# Patient Record
Sex: FEMALE | Race: ASIAN | NOT HISPANIC OR LATINO | Employment: UNEMPLOYED | URBAN - METROPOLITAN AREA
[De-identification: names, ages, dates, MRNs, and addresses within clinical notes are randomized per-mention and may not be internally consistent; named-entity substitution may affect disease eponyms.]

---

## 2019-05-10 PROBLEM — K52.9 ACUTE GASTROENTERITIS: Status: ACTIVE | Noted: 2019-05-10

## 2019-05-11 PROBLEM — E01.0 THYROMEGALY: Status: ACTIVE | Noted: 2019-05-11

## 2019-05-11 PROBLEM — D75.1 POLYCYTHEMIA: Status: ACTIVE | Noted: 2019-05-11

## 2019-05-11 PROBLEM — R80.9 PROTEINURIA: Status: ACTIVE | Noted: 2019-05-11

## 2019-05-11 PROBLEM — R11.10 RETCHING: Status: ACTIVE | Noted: 2019-05-11

## 2019-05-11 PROBLEM — R94.6 ABNORMAL THYROID FUNCTION TEST: Status: ACTIVE | Noted: 2019-05-11

## 2019-05-11 PROBLEM — E87.1 HYPONATREMIA: Status: ACTIVE | Noted: 2019-05-11

## 2019-05-11 PROBLEM — E43 SEVERE MALNUTRITION: Status: ACTIVE | Noted: 2019-05-11

## 2019-05-11 PROBLEM — K21.9 GASTROESOPHAGEAL REFLUX: Status: ACTIVE | Noted: 2019-05-11

## 2019-05-11 PROBLEM — Z75.8 DISCHARGE PLANNING ISSUES: Status: ACTIVE | Noted: 2019-05-11

## 2019-05-12 PROBLEM — E03.9 HYPOTHYROIDISM: Status: ACTIVE | Noted: 2019-05-12

## 2019-05-12 PROBLEM — R11.10 RETCHING: Status: RESOLVED | Noted: 2019-05-11 | Resolved: 2019-05-12

## 2019-05-14 PROBLEM — R33.9 URINARY RETENTION: Status: ACTIVE | Noted: 2019-05-14

## 2019-05-15 PROBLEM — E87.1 HYPONATREMIA: Status: RESOLVED | Noted: 2019-05-11 | Resolved: 2019-05-15

## 2019-05-16 PROBLEM — K21.9 GASTROESOPHAGEAL REFLUX: Status: RESOLVED | Noted: 2019-05-11 | Resolved: 2019-05-16

## 2019-05-16 PROBLEM — E03.9 HYPOTHYROIDISM: Status: RESOLVED | Noted: 2019-05-12 | Resolved: 2019-05-16

## 2019-05-17 PROBLEM — A04.8 HELICOBACTER PYLORI (H. PYLORI) INFECTION: Status: ACTIVE | Noted: 2019-05-17

## 2019-05-18 ENCOUNTER — TELEPHONE (OUTPATIENT)
Dept: NEPHROLOGY | Facility: CLINIC | Age: 44
End: 2019-05-18

## 2019-05-18 NOTE — TELEPHONE ENCOUNTER
Hospital pt discharged over the weekend. Please schedule follow up this Friday in [place]. No prior labs needed. Thank you

## 2019-05-22 ENCOUNTER — TELEPHONE (OUTPATIENT)
Dept: NEPHROLOGY | Facility: CLINIC | Age: 44
End: 2019-05-22

## 2019-05-22 NOTE — TELEPHONE ENCOUNTER
Pt is a hospital follow up and is rescheduled from [place] 5/24 to 6/7 in [place]. She lives in [place]. Please see if she can be seen Monday in [place] 6/3 as I would liek to go over the kidney biopsy results and treatment plan with pt and her . Thank you

## 2019-05-22 NOTE — TELEPHONE ENCOUNTER
called no answer - left message for pt. to call - i will change appt in system  And try and reach pt.

## 2019-06-03 ENCOUNTER — TELEPHONE (OUTPATIENT)
Dept: NEPHROLOGY | Facility: CLINIC | Age: 44
End: 2019-06-03

## 2019-06-03 ENCOUNTER — LAB VISIT (OUTPATIENT)
Dept: LAB | Facility: HOSPITAL | Age: 44
End: 2019-06-03
Attending: INTERNAL MEDICINE
Payer: OTHER GOVERNMENT

## 2019-06-03 ENCOUNTER — OFFICE VISIT (OUTPATIENT)
Dept: NEPHROLOGY | Facility: CLINIC | Age: 44
End: 2019-06-03
Payer: OTHER GOVERNMENT

## 2019-06-03 VITALS
HEART RATE: 83 BPM | BODY MASS INDEX: 21.43 KG/M2 | HEIGHT: 64 IN | WEIGHT: 125.5 LBS | SYSTOLIC BLOOD PRESSURE: 109 MMHG | DIASTOLIC BLOOD PRESSURE: 70 MMHG

## 2019-06-03 DIAGNOSIS — R80.9 NEPHROTIC RANGE PROTEINURIA: Primary | ICD-10-CM

## 2019-06-03 DIAGNOSIS — A04.8 HELICOBACTER PYLORI (H. PYLORI) INFECTION: ICD-10-CM

## 2019-06-03 DIAGNOSIS — R80.9 NEPHROTIC RANGE PROTEINURIA: ICD-10-CM

## 2019-06-03 DIAGNOSIS — R60.0 LOCALIZED EDEMA: ICD-10-CM

## 2019-06-03 DIAGNOSIS — N05.0 MINIMAL CHANGE DISEASE: ICD-10-CM

## 2019-06-03 LAB
ANION GAP SERPL CALC-SCNC: 9 MMOL/L (ref 8–16)
BUN SERPL-MCNC: 24 MG/DL (ref 6–20)
CALCIUM SERPL-MCNC: 8.5 MG/DL (ref 8.7–10.5)
CHLORIDE SERPL-SCNC: 101 MMOL/L (ref 95–110)
CO2 SERPL-SCNC: 30 MMOL/L (ref 23–29)
CREAT SERPL-MCNC: 0.8 MG/DL (ref 0.5–1.4)
EST. GFR  (AFRICAN AMERICAN): >60 ML/MIN/1.73 M^2
EST. GFR  (NON AFRICAN AMERICAN): >60 ML/MIN/1.73 M^2
GLUCOSE SERPL-MCNC: 174 MG/DL (ref 70–110)
POTASSIUM SERPL-SCNC: 3.5 MMOL/L (ref 3.5–5.1)
SODIUM SERPL-SCNC: 140 MMOL/L (ref 136–145)

## 2019-06-03 PROCEDURE — 99999 PR PBB SHADOW E&M-EST. PATIENT-LVL III: CPT | Mod: PBBFAC,,, | Performed by: INTERNAL MEDICINE

## 2019-06-03 PROCEDURE — 99214 OFFICE O/P EST MOD 30 MIN: CPT | Mod: S$PBB,,, | Performed by: INTERNAL MEDICINE

## 2019-06-03 PROCEDURE — 99999 PR PBB SHADOW E&M-EST. PATIENT-LVL III: ICD-10-PCS | Mod: PBBFAC,,, | Performed by: INTERNAL MEDICINE

## 2019-06-03 PROCEDURE — 80048 BASIC METABOLIC PNL TOTAL CA: CPT

## 2019-06-03 PROCEDURE — 99214 PR OFFICE/OUTPT VISIT, EST, LEVL IV, 30-39 MIN: ICD-10-PCS | Mod: S$PBB,,, | Performed by: INTERNAL MEDICINE

## 2019-06-03 PROCEDURE — 36415 COLL VENOUS BLD VENIPUNCTURE: CPT | Mod: PO

## 2019-06-03 PROCEDURE — 99213 OFFICE O/P EST LOW 20 MIN: CPT | Mod: PBBFAC,PO | Performed by: INTERNAL MEDICINE

## 2019-06-04 RX ORDER — FUROSEMIDE 20 MG/1
20 TABLET ORAL 2 TIMES DAILY
Qty: 60 TABLET | Refills: 11
Start: 2019-06-04 | End: 2019-07-01

## 2019-06-04 NOTE — TELEPHONE ENCOUNTER
Mailed to patient:    Please continue the prednisone  60mg until June 15th.      June 15th she should decrease the prenidsone to 40mg (two 20mg tablets)     You will see her on July 1 with labs prior. You will find the appointment slips enclosed.      Let me know how I can help before then.     Eugenia Marley LPN  Nephrology  PH   [phone]  PH   [phone]  [phone]

## 2019-06-04 NOTE — TELEPHONE ENCOUNTER
Please inform pt/:    Good news! The protein is down to 0.28 grams (was 21 grams) We continue Prednisone at 60mg for 4 weeks total then start to wean the dose. I had my dates a little wrong in clinic today and thought she started Prednisone May 8th but it was closer to May 15th so on June 15th she should decrease to 40mg (two 20mg tablets) and see me July 1st in [place] (40min EP please) Labs prior, orders in. Thank you

## 2019-06-04 NOTE — TELEPHONE ENCOUNTER
I spoke with patient's .  He seemed to understand, but I will print the instructions to mail to him re: prednisone.

## 2019-06-16 PROBLEM — R60.9 EDEMA: Status: ACTIVE | Noted: 2019-06-16

## 2019-06-16 PROBLEM — N05.0 MINIMAL CHANGE DISEASE: Status: ACTIVE | Noted: 2019-06-16

## 2019-06-16 NOTE — PROGRESS NOTES
Subjective:       Patient ID: Giles Bravo is a 44 y.o.  female who presents for follow-up evaluation of Minimal Change Disease    HPI     She was hospitalized for two main complaints: 1) GI which turned out to be H. Pylori and has resolved with treatment and 2) swelling all over. She was diagnosed with nephrotic syndrome, underwent kidney biopsy which returned as Minimal Change Disease and ATN. She was started on Prednisone 60mg QAM as well as torsemide and was discharged. She presents today for hospital follow up. She feels better, foamy urine has improved. She notes cramps in left leg. She was unable to get torsemide filled, went to ER in [place] and was given Lasix with KCl.     Review of Systems   Constitutional: Negative for fatigue and fever.   HENT: Negative for facial swelling.    Eyes: Negative for visual disturbance.   Respiratory: Negative for shortness of breath.    Cardiovascular: Positive for leg swelling (much improved, only in feet).   Gastrointestinal: Negative for abdominal pain, nausea and vomiting.   Genitourinary: Positive for frequency. Negative for difficulty urinating and hematuria.   Musculoskeletal: Negative for arthralgias.   Skin: Negative for rash.   Neurological: Negative for tremors, weakness and headaches.   Psychiatric/Behavioral: Negative for confusion.       Objective:      Physical Exam   Constitutional: She is oriented to person, place, and time. No distress.   HENT:   Mouth/Throat: Oropharynx is clear and moist.   Eyes: No scleral icterus.   Neck: No JVD present.   Cardiovascular: Exam reveals no friction rub.   Pulmonary/Chest: She has no wheezes. She has no rales.   Abdominal: Soft.   Musculoskeletal: She exhibits edema.   Neurological: She is alert and oriented to person, place, and time.   Psychiatric: She has a normal mood and affect.   Nursing note and vitals reviewed.      Assessment:       1. Nephrotic range proteinuria    2. Minimal change disease    3.  Localized edema    4. Helicobacter pylori (H. pylori) infection        Plan:           Minimal Change disease  - by symptoms she is responding well to steroids  - will check upc today    CARMEN  - RAAS blocker was not started during hospital stay due to increased Cr  - check chemistry today    Muscle cramps  - likely from sodium shifts from loop diuretic  - check lytes today      Labs today  RTC pending results

## 2019-06-26 ENCOUNTER — LAB VISIT (OUTPATIENT)
Dept: LAB | Facility: HOSPITAL | Age: 44
End: 2019-06-26
Attending: INTERNAL MEDICINE
Payer: OTHER GOVERNMENT

## 2019-06-26 DIAGNOSIS — R80.9 NEPHROTIC RANGE PROTEINURIA: ICD-10-CM

## 2019-06-26 LAB
BACTERIA #/AREA URNS HPF: ABNORMAL /HPF
BILIRUB UR QL STRIP: NEGATIVE
CLARITY UR: CLEAR
COLOR UR: YELLOW
CREAT UR-MCNC: 197 MG/DL (ref 15–325)
GLUCOSE UR QL STRIP: NEGATIVE
HGB UR QL STRIP: NEGATIVE
HYALINE CASTS #/AREA URNS LPF: 3 /LPF
KETONES UR QL STRIP: NEGATIVE
LEUKOCYTE ESTERASE UR QL STRIP: NEGATIVE
MICROSCOPIC COMMENT: ABNORMAL
NITRITE UR QL STRIP: NEGATIVE
PH UR STRIP: 6 [PH] (ref 5–8)
PROT UR QL STRIP: ABNORMAL
PROT UR-MCNC: 22 MG/DL (ref 0–15)
PROT/CREAT UR: 0.11 MG/G{CREAT} (ref 0–0.2)
RBC #/AREA URNS HPF: 1 /HPF (ref 0–4)
SP GR UR STRIP: 1.02 (ref 1–1.03)
SQUAMOUS #/AREA URNS HPF: 7 /HPF
URN SPEC COLLECT METH UR: ABNORMAL
WBC #/AREA URNS HPF: 3 /HPF (ref 0–5)

## 2019-06-26 PROCEDURE — 82570 ASSAY OF URINE CREATININE: CPT

## 2019-06-26 PROCEDURE — 81000 URINALYSIS NONAUTO W/SCOPE: CPT | Mod: PO

## 2019-07-01 ENCOUNTER — OFFICE VISIT (OUTPATIENT)
Dept: NEPHROLOGY | Facility: CLINIC | Age: 44
End: 2019-07-01
Payer: OTHER GOVERNMENT

## 2019-07-01 VITALS — BODY MASS INDEX: 22.36 KG/M2 | HEIGHT: 64 IN | WEIGHT: 131 LBS

## 2019-07-01 DIAGNOSIS — R80.9 NEPHROTIC RANGE PROTEINURIA: ICD-10-CM

## 2019-07-01 DIAGNOSIS — N05.0 MINIMAL CHANGE DISEASE: Primary | ICD-10-CM

## 2019-07-01 DIAGNOSIS — A04.8 HELICOBACTER PYLORI (H. PYLORI) INFECTION: ICD-10-CM

## 2019-07-01 PROCEDURE — 99214 PR OFFICE/OUTPT VISIT, EST, LEVL IV, 30-39 MIN: ICD-10-PCS | Mod: S$PBB,,, | Performed by: INTERNAL MEDICINE

## 2019-07-01 PROCEDURE — 99999 PR PBB SHADOW E&M-EST. PATIENT-LVL III: CPT | Mod: PBBFAC,,, | Performed by: INTERNAL MEDICINE

## 2019-07-01 PROCEDURE — 99213 OFFICE O/P EST LOW 20 MIN: CPT | Mod: PBBFAC,PO | Performed by: INTERNAL MEDICINE

## 2019-07-01 PROCEDURE — 99999 PR PBB SHADOW E&M-EST. PATIENT-LVL III: ICD-10-PCS | Mod: PBBFAC,,, | Performed by: INTERNAL MEDICINE

## 2019-07-01 PROCEDURE — 99214 OFFICE O/P EST MOD 30 MIN: CPT | Mod: S$PBB,,, | Performed by: INTERNAL MEDICINE

## 2019-07-01 RX ORDER — AZELASTINE 1 MG/ML
1 SPRAY, METERED NASAL
COMMUNITY
Start: 2019-02-11 | End: 2019-07-01 | Stop reason: ALTCHOICE

## 2019-07-01 RX ORDER — PREDNISONE 10 MG/1
TABLET ORAL
Qty: 50 TABLET | Refills: 0 | Status: SHIPPED | OUTPATIENT
Start: 2019-07-01 | End: 2019-09-05

## 2019-07-01 RX ORDER — POTASSIUM CHLORIDE 20 MEQ/1
20 TABLET, EXTENDED RELEASE ORAL
COMMUNITY
Start: 2019-05-21 | End: 2019-07-01 | Stop reason: ALTCHOICE

## 2019-07-01 RX ORDER — ESOMEPRAZOLE MAGNESIUM 40 MG/1
40 CAPSULE, DELAYED RELEASE ORAL
COMMUNITY
Start: 2019-02-13 | End: 2019-08-12

## 2019-07-01 RX ORDER — FLUTICASONE PROPIONATE 50 MCG
2 SPRAY, SUSPENSION (ML) NASAL
COMMUNITY
Start: 2019-02-11 | End: 2019-07-01 | Stop reason: ALTCHOICE

## 2019-07-08 NOTE — PROGRESS NOTES
Subjective:       Patient ID: Giles Bravo is a 44 y.o.  female who presents for follow-up evaluation of Follow-up and Nephrotic Syndrome    HPI       She was hospitalized for two main complaints: 1) GI which turned out to be H. Pylori and has resolved with treatment and 2) swelling all over. She was diagnosed with nephrotic syndrome, underwent kidney biopsy which returned as Minimal Change Disease and ATN. She was started on Prednisone 60mg QAM as well as torsemide and was discharged. She presents today for hospital follow up. She feels better, foamy urine has improved. She notes cramps in left leg. She was unable to get torsemide filled, went to ER in [place] and was given Lasix with KCl.     Interval history June 2019: no foamy urine. No edema and no abdominal fullness. She is feeling well overall. Saw Dr. Nolan for follow up of thyroid disease    Review of Systems   Constitutional: Negative for fatigue and fever.   HENT: Negative for facial swelling.    Eyes: Negative for visual disturbance.   Respiratory: Negative for shortness of breath.    Cardiovascular: Negative for leg swelling (much improved, only in feet).   Gastrointestinal: Negative for abdominal distention, nausea and vomiting.   Genitourinary: Negative for difficulty urinating, frequency and hematuria.   Musculoskeletal: Negative for arthralgias.   Skin: Negative for rash.   Neurological: Negative for tremors, weakness and headaches.   Psychiatric/Behavioral: Negative for confusion.       Objective:      Physical Exam   Constitutional: She is oriented to person, place, and time. No distress.   HENT:   Mouth/Throat: Oropharynx is clear and moist.   Eyes: No scleral icterus.   Neck: No JVD present.   Cardiovascular: Exam reveals no friction rub.   Pulmonary/Chest: She has no wheezes. She has no rales.   Abdominal: Soft.   Musculoskeletal: She exhibits no edema.   Neurological: She is alert and oriented to person, place, and time.    Psychiatric: She has a normal mood and affect.   Nursing note and vitals reviewed.      Assessment:       1. Minimal change disease    2. Nephrotic range proteinuria    3. Helicobacter pylori (H. pylori) infection        Plan:           Minimal Change Disease  - very good response to steroids alone, upc is normal, serum albumin is 3.4, and ATN resolved by serum creatinine of 0.8mg/dL  - taper Prednisone per the following:10mg X 4 weeks then 5mg X 4 weeks then will re-evaluate     Volume status is euvolemic   - stop Lasix      RTC 6 weeks      - RAAS blocker was not started during hospital stay due to increased Cr  - check chemistry today    Muscle cramps  - likely from sodium shifts from loop diuretic  - check lytes today      Labs today  RTC pending results

## 2019-07-18 ENCOUNTER — OFFICE VISIT (OUTPATIENT)
Dept: NEPHROLOGY | Facility: CLINIC | Age: 44
End: 2019-07-18
Payer: OTHER GOVERNMENT

## 2019-07-18 ENCOUNTER — TELEPHONE (OUTPATIENT)
Dept: NEPHROLOGY | Facility: CLINIC | Age: 44
End: 2019-07-18

## 2019-07-18 VITALS
HEIGHT: 64 IN | BODY MASS INDEX: 22.85 KG/M2 | SYSTOLIC BLOOD PRESSURE: 126 MMHG | WEIGHT: 133.81 LBS | HEART RATE: 78 BPM | DIASTOLIC BLOOD PRESSURE: 72 MMHG

## 2019-07-18 DIAGNOSIS — N39.0 UTI (URINARY TRACT INFECTION), UNCOMPLICATED: ICD-10-CM

## 2019-07-18 DIAGNOSIS — A04.8 HELICOBACTER PYLORI (H. PYLORI) INFECTION: ICD-10-CM

## 2019-07-18 DIAGNOSIS — E03.9 HYPOTHYROIDISM, UNSPECIFIED TYPE: ICD-10-CM

## 2019-07-18 DIAGNOSIS — N05.0 MINIMAL CHANGE DISEASE: Primary | ICD-10-CM

## 2019-07-18 PROCEDURE — 99999 PR PBB SHADOW E&M-EST. PATIENT-LVL III: CPT | Mod: PBBFAC,,, | Performed by: INTERNAL MEDICINE

## 2019-07-18 PROCEDURE — 99999 PR PBB SHADOW E&M-EST. PATIENT-LVL III: ICD-10-PCS | Mod: PBBFAC,,, | Performed by: INTERNAL MEDICINE

## 2019-07-18 PROCEDURE — 99214 PR OFFICE/OUTPT VISIT, EST, LEVL IV, 30-39 MIN: ICD-10-PCS | Mod: S$PBB,,, | Performed by: INTERNAL MEDICINE

## 2019-07-18 PROCEDURE — 99213 OFFICE O/P EST LOW 20 MIN: CPT | Mod: PBBFAC,PO | Performed by: INTERNAL MEDICINE

## 2019-07-18 PROCEDURE — 99214 OFFICE O/P EST MOD 30 MIN: CPT | Mod: S$PBB,,, | Performed by: INTERNAL MEDICINE

## 2019-07-18 NOTE — PATIENT INSTRUCTIONS
1. Minimal Change Disease may have returned due to fast taper of prednisone that you mistakenly did June 24th. If the disease returned--this will be confirmed by urine tests today.   2. If the disease has returned we will increase prednisone back to 40mg for at least 4 weeks.

## 2019-07-18 NOTE — TELEPHONE ENCOUNTER
Unable to reach patient x 3 attempts this morning.  I cannot figure out who her PCP might be.      I am not able to obtain results from [facility].

## 2019-07-18 NOTE — TELEPHONE ENCOUNTER
"Spoke with  who states they saw Dr Villanueva, as patient had headache and "kidney pain."  He recommended for her to follow up for proteinuria.      Will acquire results from his office.   "

## 2019-07-18 NOTE — TELEPHONE ENCOUNTER
Pt scheduled today to see me earlier than scheduled--PCP noted 3+ protein on u/a. Can we obtain those results prior to her appt? Thank you

## 2019-07-19 ENCOUNTER — LAB VISIT (OUTPATIENT)
Dept: LAB | Facility: HOSPITAL | Age: 44
End: 2019-07-19
Attending: INTERNAL MEDICINE
Payer: OTHER GOVERNMENT

## 2019-07-19 ENCOUNTER — TELEPHONE (OUTPATIENT)
Dept: UROLOGY | Facility: CLINIC | Age: 44
End: 2019-07-19

## 2019-07-19 ENCOUNTER — TELEPHONE (OUTPATIENT)
Dept: NEPHROLOGY | Facility: CLINIC | Age: 44
End: 2019-07-19

## 2019-07-19 DIAGNOSIS — N39.0 UTI (URINARY TRACT INFECTION), UNCOMPLICATED: ICD-10-CM

## 2019-07-19 DIAGNOSIS — N05.0 MINIMAL CHANGE DISEASE: ICD-10-CM

## 2019-07-19 LAB
BACTERIA #/AREA URNS HPF: ABNORMAL /HPF
BILIRUB UR QL STRIP: NEGATIVE
CLARITY UR: CLEAR
COLOR UR: YELLOW
CREAT UR-MCNC: 174 MG/DL (ref 15–325)
GLUCOSE UR QL STRIP: NEGATIVE
HGB UR QL STRIP: ABNORMAL
HYALINE CASTS #/AREA URNS LPF: 40 /LPF
KETONES UR QL STRIP: NEGATIVE
LEUKOCYTE ESTERASE UR QL STRIP: NEGATIVE
MICROSCOPIC COMMENT: ABNORMAL
NITRITE UR QL STRIP: NEGATIVE
PH UR STRIP: 6 [PH] (ref 5–8)
PROT UR QL STRIP: ABNORMAL
PROT UR-MCNC: >1500 MG/DL (ref 0–15)
PROT/CREAT UR: ABNORMAL MG/G{CREAT} (ref 0–0.2)
RBC #/AREA URNS HPF: 2 /HPF (ref 0–4)
SP GR UR STRIP: 1.03 (ref 1–1.03)
SQUAMOUS #/AREA URNS HPF: 20 /HPF
URN SPEC COLLECT METH UR: ABNORMAL
WBC #/AREA URNS HPF: 2 /HPF (ref 0–5)

## 2019-07-19 PROCEDURE — 87088 URINE BACTERIA CULTURE: CPT

## 2019-07-19 PROCEDURE — 87186 SC STD MICRODIL/AGAR DIL: CPT

## 2019-07-19 PROCEDURE — 87086 URINE CULTURE/COLONY COUNT: CPT

## 2019-07-19 PROCEDURE — 82570 ASSAY OF URINE CREATININE: CPT

## 2019-07-19 PROCEDURE — 81000 URINALYSIS NONAUTO W/SCOPE: CPT | Mod: PO

## 2019-07-19 PROCEDURE — 87077 CULTURE AEROBIC IDENTIFY: CPT

## 2019-07-19 RX ORDER — PREDNISONE 10 MG/1
TABLET ORAL
Qty: 120 TABLET | Refills: 1 | Status: SHIPPED | OUTPATIENT
Start: 2019-07-19 | End: 2019-09-05

## 2019-07-19 NOTE — TELEPHONE ENCOUNTER
Protein in the urine is confirmed. Increase prednisone to 40mg QAM. RTC 3-4 weeks in [place]. Labs same day after clinic. Thank you

## 2019-07-21 LAB — BACTERIA UR CULT: ABNORMAL

## 2019-07-22 ENCOUNTER — TELEPHONE (OUTPATIENT)
Dept: NEPHROLOGY | Facility: CLINIC | Age: 44
End: 2019-07-22

## 2019-07-22 RX ORDER — DOXYCYCLINE 100 MG/1
100 CAPSULE ORAL 2 TIMES DAILY WITH MEALS
Qty: 6 CAPSULE | Refills: 0 | Status: SHIPPED | OUTPATIENT
Start: 2019-07-22 | End: 2019-07-25

## 2019-07-22 NOTE — TELEPHONE ENCOUNTER
Please inform pt/ that she has a bladder infection. I have sent Doxycycline to the pharmacy [facility] to take for 3 days. Thank you

## 2019-07-22 NOTE — TELEPHONE ENCOUNTER
Spoke to the pt's , Facundo and he verbally understood that his wife has a UTI and that an Antibiotic, Doxycycline was sent to her pharmacy.

## 2019-07-25 ENCOUNTER — TELEPHONE (OUTPATIENT)
Dept: UROLOGY | Facility: CLINIC | Age: 44
End: 2019-07-25

## 2019-07-25 NOTE — TELEPHONE ENCOUNTER
----- Message from Katherin Blanchard sent at 7/25/2019 12:59 PM CDT -----  Contact: Facundo Bravo (Spouse)  Type: Needs Medical Advice    Who Called:Facundo Bravo (Spouse)    Symptoms (please be specific): gaining water weight  How long has patient had these symptoms:  Na  [facility] - [place], [place] - [address]  [address]  [place] [address]  Phone: [phone] Fax: [phone]  Best Call Back Number: [phone]  Additional Information: Calling to request a prescription for Lasix.

## 2019-07-26 RX ORDER — FUROSEMIDE 20 MG/1
TABLET ORAL
Qty: 60 TABLET | Refills: 11 | Status: SHIPPED | OUTPATIENT
Start: 2019-07-26 | End: 2019-08-29 | Stop reason: SDUPTHER

## 2019-07-26 NOTE — TELEPHONE ENCOUNTER
----- Message from Katherin Blanchard sent at 7/26/2019  8:33 AM CDT -----  Contact: Facundo Bravo (Spouse)  Type:  Patient Returning Call    Who Called: Facundo Bravo (Spouse)  Who Left Message for Patient:  Natasha  Does the patient know what this is regarding?: about requesting a medication  Best Call Back Number:    Additional Information: call to pod. No answer

## 2019-07-26 NOTE — TELEPHONE ENCOUNTER
Patient's  reports an increase in her weight of 10 lbs in just a week. She is taking Prednisone 40 mg. Could you please send a prescription for a diuretic to [facility] in [place] today?

## 2019-07-28 NOTE — PROGRESS NOTES
Subjective:       Patient ID: Giles Bravo is a 44 y.o.  female who presents for follow-up evaluation of Nephrotic Syndrome and Minimal Change Disease    HPI       She was hospitalized for two main complaints: 1) GI which turned out to be H. Pylori and has resolved with treatment and 2) swelling all over. She was diagnosed with nephrotic syndrome, underwent kidney biopsy which returned as Minimal Change Disease and ATN. She was started on Prednisone 60mg QAM as well as torsemide and was discharged. She presents today for hospital follow up. She feels better, foamy urine has improved. She notes cramps in left leg. She was unable to get torsemide filled, went to ER in [place] and was given Lasix with KCl.     Interval history June 2019: no foamy urine. No edema and no abdominal fullness. She is feeling well overall. Saw Dr. Nolan for follow up of thyroid disease    Interval history July 18 2019: seen sooner for 3+ protein seen on urine dipstick performed by PCP. She denies edema but has gained 10lbs. She also notes foamy urine again. She saw PCP for HA but no new medications.     Review of Systems   Constitutional: Negative for fatigue and fever.   HENT: Negative for facial swelling.    Eyes: Negative for visual disturbance.   Respiratory: Negative for shortness of breath.    Cardiovascular: Negative for leg swelling.   Gastrointestinal: Negative for abdominal distention, nausea and vomiting.   Genitourinary: Negative for difficulty urinating, frequency and hematuria.   Musculoskeletal: Negative for arthralgias.   Skin: Negative for rash.   Neurological: Negative for tremors, weakness and headaches.   Psychiatric/Behavioral: Negative for confusion.       Objective:      Physical Exam   Constitutional: She is oriented to person, place, and time. No distress.   HENT:   Mouth/Throat: Oropharynx is clear and moist.   Eyes: No scleral icterus.   Neck: No JVD present.   Cardiovascular: Exam reveals no friction rub.    Pulmonary/Chest: She has no wheezes. She has no rales.   Abdominal: Soft.   Musculoskeletal: She exhibits no edema.   Neurological: She is alert and oriented to person, place, and time.   Psychiatric: She has a normal mood and affect.   Nursing note and vitals reviewed.      Assessment:       1. Minimal change disease    2. UTI (urinary tract infection), uncomplicated    3. Hypothyroidism, unspecified type    4. Helicobacter pylori (H. pylori) infection        Plan:           Minimal Change Disease  - she has a very good response to steroids alone but she accidentally tapered too fast. Will resume prednisone at 40mg    Volume status    - weight gain, low sodium diet and monitor       RTC 4 weeks

## 2019-08-12 ENCOUNTER — OFFICE VISIT (OUTPATIENT)
Dept: NEPHROLOGY | Facility: CLINIC | Age: 44
End: 2019-08-12
Payer: OTHER GOVERNMENT

## 2019-08-12 VITALS
WEIGHT: 142.19 LBS | BODY MASS INDEX: 24.28 KG/M2 | HEART RATE: 86 BPM | OXYGEN SATURATION: 98 % | HEIGHT: 64 IN | DIASTOLIC BLOOD PRESSURE: 76 MMHG | SYSTOLIC BLOOD PRESSURE: 130 MMHG

## 2019-08-12 DIAGNOSIS — R60.0 LOCALIZED EDEMA: ICD-10-CM

## 2019-08-12 DIAGNOSIS — R80.9 NEPHROTIC RANGE PROTEINURIA: ICD-10-CM

## 2019-08-12 DIAGNOSIS — E03.9 HYPOTHYROIDISM, UNSPECIFIED TYPE: ICD-10-CM

## 2019-08-12 DIAGNOSIS — A04.8 HELICOBACTER PYLORI (H. PYLORI) INFECTION: ICD-10-CM

## 2019-08-12 DIAGNOSIS — N05.0 MINIMAL CHANGE DISEASE: Primary | ICD-10-CM

## 2019-08-12 PROCEDURE — 99999 PR PBB SHADOW E&M-EST. PATIENT-LVL II: CPT | Mod: PBBFAC,,, | Performed by: INTERNAL MEDICINE

## 2019-08-12 PROCEDURE — 99999 PR PBB SHADOW E&M-EST. PATIENT-LVL II: ICD-10-PCS | Mod: PBBFAC,,, | Performed by: INTERNAL MEDICINE

## 2019-08-12 PROCEDURE — 99213 PR OFFICE/OUTPT VISIT, EST, LEVL III, 20-29 MIN: ICD-10-PCS | Mod: S$PBB,,, | Performed by: INTERNAL MEDICINE

## 2019-08-12 PROCEDURE — 99213 OFFICE O/P EST LOW 20 MIN: CPT | Mod: S$PBB,,, | Performed by: INTERNAL MEDICINE

## 2019-08-12 PROCEDURE — 99212 OFFICE O/P EST SF 10 MIN: CPT | Mod: PBBFAC,PO | Performed by: INTERNAL MEDICINE

## 2019-08-13 ENCOUNTER — LAB VISIT (OUTPATIENT)
Dept: LAB | Facility: HOSPITAL | Age: 44
End: 2019-08-13
Attending: INTERNAL MEDICINE
Payer: OTHER GOVERNMENT

## 2019-08-13 DIAGNOSIS — N05.0 MINIMAL CHANGE DISEASE: ICD-10-CM

## 2019-08-13 LAB
ALBUMIN SERPL BCP-MCNC: 1.6 G/DL (ref 3.5–5.2)
ANION GAP SERPL CALC-SCNC: 6 MMOL/L (ref 8–16)
BASOPHILS # BLD AUTO: 0.01 K/UL (ref 0–0.2)
BASOPHILS NFR BLD: 0.1 % (ref 0–1.9)
BUN SERPL-MCNC: 17 MG/DL (ref 6–20)
CALCIUM SERPL-MCNC: 8.1 MG/DL (ref 8.7–10.5)
CHLORIDE SERPL-SCNC: 100 MMOL/L (ref 95–110)
CO2 SERPL-SCNC: 30 MMOL/L (ref 23–29)
CREAT SERPL-MCNC: 0.8 MG/DL (ref 0.5–1.4)
DIFFERENTIAL METHOD: ABNORMAL
EOSINOPHIL # BLD AUTO: 0 K/UL (ref 0–0.5)
EOSINOPHIL NFR BLD: 0 % (ref 0–8)
ERYTHROCYTE [DISTWIDTH] IN BLOOD BY AUTOMATED COUNT: 13.1 % (ref 11.5–14.5)
EST. GFR  (AFRICAN AMERICAN): >60 ML/MIN/1.73 M^2
EST. GFR  (NON AFRICAN AMERICAN): >60 ML/MIN/1.73 M^2
GLUCOSE SERPL-MCNC: 331 MG/DL (ref 70–110)
HCT VFR BLD AUTO: 41 % (ref 37–48.5)
HGB BLD-MCNC: 13.1 G/DL (ref 12–16)
IMM GRANULOCYTES # BLD AUTO: 0.07 K/UL (ref 0–0.04)
IMM GRANULOCYTES NFR BLD AUTO: 0.4 % (ref 0–0.5)
LYMPHOCYTES # BLD AUTO: 1 K/UL (ref 1–4.8)
LYMPHOCYTES NFR BLD: 6.2 % (ref 18–48)
MCH RBC QN AUTO: 30.8 PG (ref 27–31)
MCHC RBC AUTO-ENTMCNC: 32 G/DL (ref 32–36)
MCV RBC AUTO: 97 FL (ref 82–98)
MONOCYTES # BLD AUTO: 0.5 K/UL (ref 0.3–1)
MONOCYTES NFR BLD: 2.9 % (ref 4–15)
NEUTROPHILS # BLD AUTO: 14.1 K/UL (ref 1.8–7.7)
NEUTROPHILS NFR BLD: 90.4 % (ref 38–73)
NRBC BLD-RTO: 0 /100 WBC
PHOSPHATE SERPL-MCNC: 2.6 MG/DL (ref 2.7–4.5)
PLATELET # BLD AUTO: 237 K/UL (ref 150–350)
PMV BLD AUTO: 9.6 FL (ref 9.2–12.9)
POTASSIUM SERPL-SCNC: 4 MMOL/L (ref 3.5–5.1)
RBC # BLD AUTO: 4.25 M/UL (ref 4–5.4)
SODIUM SERPL-SCNC: 136 MMOL/L (ref 136–145)
WBC # BLD AUTO: 15.64 K/UL (ref 3.9–12.7)

## 2019-08-13 PROCEDURE — 36415 COLL VENOUS BLD VENIPUNCTURE: CPT | Mod: PO

## 2019-08-13 PROCEDURE — 80069 RENAL FUNCTION PANEL: CPT

## 2019-08-13 PROCEDURE — 85025 COMPLETE CBC W/AUTO DIFF WBC: CPT

## 2019-08-14 ENCOUNTER — TELEPHONE (OUTPATIENT)
Dept: NEPHROLOGY | Facility: CLINIC | Age: 44
End: 2019-08-14

## 2019-08-14 DIAGNOSIS — N05.0 MINIMAL CHANGE DISEASE: Primary | ICD-10-CM

## 2019-08-14 NOTE — TELEPHONE ENCOUNTER
Please inform pt/ that she still has very much protein in the urine so the prednisone needs to remain at 40mg for 2 more weeks. RTC in [place] in 2 weeks with labs prior. Orders in    Also her glucose was 300 on lab results--a side effect from the prednisone. I can treat this or she can see her PCP--it's up to her.     Thank you

## 2019-08-14 NOTE — TELEPHONE ENCOUNTER
Called Ele who asked me to call her .      He voiced understanding.      They would like for you to address the increased glucose level r/t prednisone.      Thanks.

## 2019-08-15 RX ORDER — NATEGLINIDE 60 MG/1
60 TABLET ORAL
Qty: 270 TABLET | Refills: 3 | Status: ON HOLD | OUTPATIENT
Start: 2019-08-15 | End: 2019-10-21

## 2019-08-15 NOTE — TELEPHONE ENCOUNTER
Starlix, blood sugar medication, sent to pharmacy. Only take if she eats a meal. It's in the directions but just in case reinforce there is no point taking it if she does not eat. Thank you

## 2019-08-18 NOTE — PROGRESS NOTES
Subjective:       Patient ID: Giles Bravo is a 44 y.o.  female who presents for follow-up evaluation of Proteinuria and Nephrotic Syndrome    HPI     She was hospitalized for two main complaints: 1) GI which turned out to be H. Pylori and has resolved with treatment and 2) swelling all over. She was diagnosed with nephrotic syndrome, underwent kidney biopsy which returned as Minimal Change Disease and ATN. She was started on Prednisone 60mg QAM as well as torsemide and was discharged. She presents today for hospital follow up. She feels better, foamy urine has improved. She notes cramps in left leg. She was unable to get torsemide filled, went to ER in [place] and was given Lasix with KCl.     Interval history June 2019: no foamy urine. No edema and no abdominal fullness. She is feeling well overall. Saw Dr. Nolan for follow up of thyroid disease    Interval history July 18 2019: seen sooner for 3+ protein seen on urine dipstick performed by PCP. She denies edema but has gained 10lbs. She also notes foamy urine again. She saw PCP for HA but no new medications.     Interval history August 12, 2019: she remains on 40mg of prednisone. She is unhappy with this due to the side effects which includes puffy face. She is on Lasix 20-40g daily and no LE edema currently. She feels a little fatigued.    Her  had a partial thyroidectomy and path shows thyroid cancer.     Review of Systems   Constitutional: Negative for fatigue and fever.   HENT: Negative for facial swelling.    Eyes: Negative for visual disturbance.   Respiratory: Negative for shortness of breath.    Cardiovascular: Negative for leg swelling.   Gastrointestinal: Negative for abdominal distention, nausea and vomiting.   Genitourinary: Negative for difficulty urinating, frequency and hematuria.   Musculoskeletal: Negative for arthralgias.   Skin: Negative for rash.   Neurological: Negative for tremors, weakness and headaches.    Psychiatric/Behavioral: Negative for confusion.       Objective:      Physical Exam   Constitutional: She is oriented to person, place, and time. No distress.   HENT:   Mouth/Throat: Oropharynx is clear and moist.   Eyes: No scleral icterus.   Neck: No JVD present.   Cardiovascular: Exam reveals no friction rub.   Pulmonary/Chest: She has no wheezes. She has no rales.   Abdominal: Soft.   Musculoskeletal: She exhibits no edema.   Neurological: She is alert and oriented to person, place, and time.   Psychiatric: She has a normal mood and affect.   Nursing note and vitals reviewed.      Assessment:       1. Minimal change disease    2. Nephrotic range proteinuria    3. Hypothyroidism, unspecified type    4. Helicobacter pylori (H. pylori) infection    5. Localized edema        Plan:           Minimal Change Disease  - continue prednisone at 40mg, check upc and serum albumin today which will dictate her prednisone dose    Volume status    - weight gain, low sodium diet and loop diuretic

## 2019-08-27 ENCOUNTER — LAB VISIT (OUTPATIENT)
Dept: LAB | Facility: HOSPITAL | Age: 44
End: 2019-08-27
Attending: INTERNAL MEDICINE
Payer: OTHER GOVERNMENT

## 2019-08-27 DIAGNOSIS — N05.0 MINIMAL CHANGE DISEASE: ICD-10-CM

## 2019-08-27 LAB
ANION GAP SERPL CALC-SCNC: 11 MMOL/L (ref 8–16)
BUN SERPL-MCNC: 20 MG/DL (ref 6–20)
CALCIUM SERPL-MCNC: 9.5 MG/DL (ref 8.7–10.5)
CHLORIDE SERPL-SCNC: 99 MMOL/L (ref 95–110)
CO2 SERPL-SCNC: 26 MMOL/L (ref 23–29)
CREAT SERPL-MCNC: 0.9 MG/DL (ref 0.5–1.4)
EST. GFR  (AFRICAN AMERICAN): >60 ML/MIN/1.73 M^2
EST. GFR  (NON AFRICAN AMERICAN): >60 ML/MIN/1.73 M^2
GLUCOSE SERPL-MCNC: 454 MG/DL (ref 70–110)
POTASSIUM SERPL-SCNC: 3.9 MMOL/L (ref 3.5–5.1)
SODIUM SERPL-SCNC: 136 MMOL/L (ref 136–145)

## 2019-08-27 PROCEDURE — 36415 COLL VENOUS BLD VENIPUNCTURE: CPT | Mod: PO

## 2019-08-27 PROCEDURE — 80048 BASIC METABOLIC PNL TOTAL CA: CPT

## 2019-08-29 RX ORDER — FUROSEMIDE 20 MG/1
TABLET ORAL
Qty: 60 TABLET | Refills: 1 | Status: SHIPPED | OUTPATIENT
Start: 2019-08-29 | End: 2019-09-05 | Stop reason: SDUPTHER

## 2019-08-29 NOTE — TELEPHONE ENCOUNTER
----- Message from Melania Hernandez sent at 8/29/2019  2:47 PM CDT -----  Contact: Patient  Patient needs a refill for Lasix sent in to [facility] [place].  Please call and advise [phone]. [facility] [place]

## 2019-09-05 ENCOUNTER — OFFICE VISIT (OUTPATIENT)
Dept: NEPHROLOGY | Facility: CLINIC | Age: 44
End: 2019-09-05
Payer: OTHER GOVERNMENT

## 2019-09-05 VITALS
HEART RATE: 80 BPM | DIASTOLIC BLOOD PRESSURE: 86 MMHG | WEIGHT: 135.88 LBS | HEIGHT: 64 IN | SYSTOLIC BLOOD PRESSURE: 152 MMHG | BODY MASS INDEX: 23.2 KG/M2

## 2019-09-05 DIAGNOSIS — R80.9 NEPHROTIC RANGE PROTEINURIA: ICD-10-CM

## 2019-09-05 DIAGNOSIS — E03.9 HYPOTHYROIDISM, UNSPECIFIED TYPE: ICD-10-CM

## 2019-09-05 DIAGNOSIS — N05.0 MINIMAL CHANGE DISEASE: Primary | ICD-10-CM

## 2019-09-05 PROCEDURE — 99999 PR PBB SHADOW E&M-EST. PATIENT-LVL III: ICD-10-PCS | Mod: PBBFAC,,, | Performed by: INTERNAL MEDICINE

## 2019-09-05 PROCEDURE — 99214 PR OFFICE/OUTPT VISIT, EST, LEVL IV, 30-39 MIN: ICD-10-PCS | Mod: S$PBB,,, | Performed by: INTERNAL MEDICINE

## 2019-09-05 PROCEDURE — 99214 OFFICE O/P EST MOD 30 MIN: CPT | Mod: S$PBB,,, | Performed by: INTERNAL MEDICINE

## 2019-09-05 PROCEDURE — 99999 PR PBB SHADOW E&M-EST. PATIENT-LVL III: CPT | Mod: PBBFAC,,, | Performed by: INTERNAL MEDICINE

## 2019-09-05 PROCEDURE — 99213 OFFICE O/P EST LOW 20 MIN: CPT | Mod: PBBFAC,PO | Performed by: INTERNAL MEDICINE

## 2019-09-05 RX ORDER — PREDNISONE 2.5 MG/1
TABLET ORAL
Qty: 10 TABLET | Refills: 0 | Status: ON HOLD | OUTPATIENT
Start: 2019-09-05 | End: 2019-10-25 | Stop reason: HOSPADM

## 2019-09-05 RX ORDER — PREDNISONE 10 MG/1
TABLET ORAL
Qty: 30 TABLET | Refills: 0 | Status: ON HOLD | OUTPATIENT
Start: 2019-09-05 | End: 2019-10-25 | Stop reason: HOSPADM

## 2019-09-05 RX ORDER — PREDNISONE 5 MG/1
TABLET ORAL
Qty: 10 TABLET | Refills: 0 | Status: ON HOLD | OUTPATIENT
Start: 2019-09-05 | End: 2019-10-25 | Stop reason: HOSPADM

## 2019-09-05 RX ORDER — FUROSEMIDE 20 MG/1
TABLET ORAL
Qty: 60 TABLET | Refills: 1 | Status: ON HOLD | OUTPATIENT
Start: 2019-09-05 | End: 2019-10-25 | Stop reason: HOSPADM

## 2019-09-05 RX ORDER — PREDNISONE 20 MG/1
TABLET ORAL
Qty: 30 TABLET | Refills: 0 | Status: ON HOLD | OUTPATIENT
Start: 2019-09-05 | End: 2019-10-25 | Stop reason: HOSPADM

## 2019-09-05 NOTE — PATIENT INSTRUCTIONS
1. Decrease prednisone to 30mg daily for two weeks  2. Then decrease prednisone to 20mg daily for two week  3. Then decrease prednisone to 10mg daily for two weeks  4. Then decrease prednisone to 5mg daily for two weeks  5. Then decrease prednisone to 5mg every other day for 8 days  5. Then decrease prednisone to 2.5mg every other day for 8 days then STOP     Prednisone doses sent to [facility]:  20mg, 10mg, 5mg, 2.5mg       OK for protein in the diet. OK for meat, fish, all seafood, eggs, beans, dairy. No food restrictions, only low sodium      Your blood sugar will improve once the prednisone decreases. The medication I prescribed to take with meals for high blood sugars is only temporary. But you may stop it now if you wish

## 2019-09-06 ENCOUNTER — TELEPHONE (OUTPATIENT)
Dept: NEPHROLOGY | Facility: CLINIC | Age: 44
End: 2019-09-06

## 2019-09-06 NOTE — TELEPHONE ENCOUNTER
----- Message from Kallie Robledo sent at 9/6/2019  9:29 AM CDT -----  Type:  Pharmacy Calling to Clarify an RX    Name of Caller:  Nicolle   Pharmacy Name:    [facility] - [place] - [address]  [address]  [place] [address]  Phone: [phone] Fax: [phone]      Prescription Name:  Prednisone   What do they need to clarify?:  Directions   Best Call Back Number:  [phone]  Additional Information:

## 2019-09-06 NOTE — TELEPHONE ENCOUNTER
No answer at [facility] pharmacy desk.  Faxed written instructions to pharmacy with  My contact information.

## 2019-09-15 NOTE — PROGRESS NOTES
Subjective:       Patient ID: Giles Bravo is a 44 y.o.  female who presents for follow-up evaluation of Nephrotic Syndrome (JOSSE)    HPI       She was hospitalized for two main complaints: 1) GI which turned out to be H. Pylori and has resolved with treatment and 2) swelling all over. She was diagnosed with nephrotic syndrome, underwent kidney biopsy which returned as Minimal Change Disease and ATN. She was started on Prednisone 60mg QAM as well as torsemide and was discharged. She presents today for hospital follow up. She feels better, foamy urine has improved. She notes cramps in left leg. She was unable to get torsemide filled, went to ER in [place] and was given Lasix with KCl.     Interval history June 2019: no foamy urine. No edema and no abdominal fullness. She is feeling well overall. Saw Dr. Nolan for follow up of thyroid disease    Interval history July 18 2019: seen sooner for 3+ protein seen on urine dipstick performed by PCP. She denies edema but has gained 10lbs. She also notes foamy urine again. She saw PCP for HA but no new medications.     Interval history August 12, 2019: she remains on 40mg of prednisone. She is unhappy with this due to the side effects which includes puffy face. She is on Lasix 20-40g daily and no LE edema currently. She feels a little fatigued.  Her  had a partial thyroidectomy and path shows thyroid cancer.     Interval history Sep 2019: she feels better, less fluid retention. She did not start Starlix. Her  is not present today    Review of Systems   Constitutional: Negative for fatigue and fever.   HENT: Negative for facial swelling.    Eyes: Negative for visual disturbance.   Respiratory: Negative for shortness of breath.    Cardiovascular: Negative for leg swelling.   Gastrointestinal: Negative for abdominal distention, nausea and vomiting.   Genitourinary: Negative for difficulty urinating, frequency and hematuria.   Musculoskeletal: Negative  for arthralgias.   Skin: Negative for rash.   Neurological: Negative for tremors, weakness and headaches.   Psychiatric/Behavioral: Negative for confusion.       Objective:      Physical Exam   Constitutional: She is oriented to person, place, and time. No distress.   HENT:   Mouth/Throat: Oropharynx is clear and moist.   Eyes: No scleral icterus.   Neck: No JVD present.   Cardiovascular: Exam reveals no friction rub.   Pulmonary/Chest: She has no wheezes. She has no rales.   Abdominal: Soft.   Musculoskeletal: She exhibits no edema.   Neurological: She is alert and oriented to person, place, and time.   Psychiatric: She has a normal mood and affect.   Nursing note and vitals reviewed.      Assessment:       1. Minimal change disease    2. Nephrotic range proteinuria    3. Hypothyroidism, unspecified type        Plan:           Minimal Change Disease  - start SLOW prednisone taper    Volume status    - improved with treating JOSSE    If she relapses will discuss using MMF versus re-biopsy (?Membranous?)

## 2019-10-04 ENCOUNTER — LAB VISIT (OUTPATIENT)
Dept: LAB | Facility: HOSPITAL | Age: 44
End: 2019-10-04
Attending: INTERNAL MEDICINE
Payer: OTHER GOVERNMENT

## 2019-10-04 DIAGNOSIS — N05.0 MINIMAL CHANGE DISEASE: ICD-10-CM

## 2019-10-04 LAB
ALBUMIN SERPL BCP-MCNC: 1.4 G/DL (ref 3.5–5.2)
ANION GAP SERPL CALC-SCNC: 7 MMOL/L (ref 8–16)
BASOPHILS # BLD AUTO: 0.04 K/UL (ref 0–0.2)
BASOPHILS NFR BLD: 0.5 % (ref 0–1.9)
BUN SERPL-MCNC: 12 MG/DL (ref 6–20)
CALCIUM SERPL-MCNC: 8.3 MG/DL (ref 8.7–10.5)
CHLORIDE SERPL-SCNC: 100 MMOL/L (ref 95–110)
CO2 SERPL-SCNC: 31 MMOL/L (ref 23–29)
CREAT SERPL-MCNC: 0.8 MG/DL (ref 0.5–1.4)
DIFFERENTIAL METHOD: ABNORMAL
EOSINOPHIL # BLD AUTO: 0 K/UL (ref 0–0.5)
EOSINOPHIL NFR BLD: 0.5 % (ref 0–8)
ERYTHROCYTE [DISTWIDTH] IN BLOOD BY AUTOMATED COUNT: 12.6 % (ref 11.5–14.5)
EST. GFR  (AFRICAN AMERICAN): >60 ML/MIN/1.73 M^2
EST. GFR  (NON AFRICAN AMERICAN): >60 ML/MIN/1.73 M^2
GLUCOSE SERPL-MCNC: 96 MG/DL (ref 70–110)
HCT VFR BLD AUTO: 49 % (ref 37–48.5)
HGB BLD-MCNC: 15.7 G/DL (ref 12–16)
IMM GRANULOCYTES # BLD AUTO: 0.02 K/UL (ref 0–0.04)
IMM GRANULOCYTES NFR BLD AUTO: 0.3 % (ref 0–0.5)
LYMPHOCYTES # BLD AUTO: 2 K/UL (ref 1–4.8)
LYMPHOCYTES NFR BLD: 26.1 % (ref 18–48)
MCH RBC QN AUTO: 29.9 PG (ref 27–31)
MCHC RBC AUTO-ENTMCNC: 32 G/DL (ref 32–36)
MCV RBC AUTO: 93 FL (ref 82–98)
MONOCYTES # BLD AUTO: 0.5 K/UL (ref 0.3–1)
MONOCYTES NFR BLD: 6.2 % (ref 4–15)
NEUTROPHILS # BLD AUTO: 5.2 K/UL (ref 1.8–7.7)
NEUTROPHILS NFR BLD: 66.4 % (ref 38–73)
NRBC BLD-RTO: 0 /100 WBC
PHOSPHATE SERPL-MCNC: 4 MG/DL (ref 2.7–4.5)
PLATELET # BLD AUTO: 289 K/UL (ref 150–350)
PMV BLD AUTO: 9.5 FL (ref 9.2–12.9)
POTASSIUM SERPL-SCNC: 3.6 MMOL/L (ref 3.5–5.1)
RBC # BLD AUTO: 5.25 M/UL (ref 4–5.4)
SODIUM SERPL-SCNC: 138 MMOL/L (ref 136–145)
WBC # BLD AUTO: 7.75 K/UL (ref 3.9–12.7)

## 2019-10-04 PROCEDURE — 85025 COMPLETE CBC W/AUTO DIFF WBC: CPT

## 2019-10-04 PROCEDURE — 36415 COLL VENOUS BLD VENIPUNCTURE: CPT | Mod: PO

## 2019-10-04 PROCEDURE — 80069 RENAL FUNCTION PANEL: CPT

## 2019-10-21 PROBLEM — E11.9 TYPE 2 DIABETES MELLITUS WITHOUT COMPLICATION, WITHOUT LONG-TERM CURRENT USE OF INSULIN: Status: ACTIVE | Noted: 2019-10-21

## 2019-10-21 PROBLEM — R60.1 ANASARCA: Status: ACTIVE | Noted: 2019-10-21

## 2019-10-21 PROBLEM — N04.9 NEPHROTIC SYNDROME: Status: ACTIVE | Noted: 2019-10-21

## 2019-10-21 PROBLEM — J90 PLEURAL EFFUSION ON RIGHT: Status: ACTIVE | Noted: 2019-10-21

## 2019-10-23 ENCOUNTER — TELEPHONE (OUTPATIENT)
Dept: NEPHROLOGY | Facility: CLINIC | Age: 44
End: 2019-10-23

## 2019-10-23 PROBLEM — R10.9 ABDOMINAL PAIN: Status: ACTIVE | Noted: 2019-10-23

## 2019-10-23 PROBLEM — E87.6 HYPOKALEMIA: Status: ACTIVE | Noted: 2019-10-23

## 2019-10-23 PROBLEM — R19.7 DIARRHEA: Status: ACTIVE | Noted: 2019-10-23

## 2019-10-23 NOTE — TELEPHONE ENCOUNTER
----- Message from Pravin Newberry MD sent at 10/23/2019 10:50 AM CDT -----  Make her an appointment hosp f/u for 10/29 at 11 am please

## 2019-10-25 PROBLEM — K29.70 GASTRITIS WITHOUT BLEEDING: Status: ACTIVE | Noted: 2019-10-25

## 2019-10-28 ENCOUNTER — TELEPHONE (OUTPATIENT)
Dept: NEPHROLOGY | Facility: CLINIC | Age: 44
End: 2019-10-28

## 2019-10-28 NOTE — TELEPHONE ENCOUNTER
----- Message from Jane Pearl sent at 10/28/2019  9:53 AM CDT -----  Contact: Mrs. Bravo  [phone]  Patient called and asked if you will be able to keep the same date 11/5/19  of her appt but she is asking to be seen earlier due to transportation issues. Please call back. Or later after 1 p.m

## 2019-11-05 ENCOUNTER — OFFICE VISIT (OUTPATIENT)
Dept: NEPHROLOGY | Facility: CLINIC | Age: 44
End: 2019-11-05
Payer: OTHER GOVERNMENT

## 2019-11-05 VITALS
HEIGHT: 64 IN | BODY MASS INDEX: 23.82 KG/M2 | HEART RATE: 90 BPM | SYSTOLIC BLOOD PRESSURE: 120 MMHG | WEIGHT: 139.56 LBS | OXYGEN SATURATION: 97 % | DIASTOLIC BLOOD PRESSURE: 72 MMHG

## 2019-11-05 DIAGNOSIS — E78.1 HYPERTRIGLYCERIDEMIA: ICD-10-CM

## 2019-11-05 DIAGNOSIS — J90 PLEURAL EFFUSION ON RIGHT: ICD-10-CM

## 2019-11-05 DIAGNOSIS — N05.0 MINIMAL CHANGE DISEASE: Primary | ICD-10-CM

## 2019-11-05 DIAGNOSIS — R80.9 NEPHROTIC RANGE PROTEINURIA: ICD-10-CM

## 2019-11-05 PROCEDURE — 99214 PR OFFICE/OUTPT VISIT, EST, LEVL IV, 30-39 MIN: ICD-10-PCS | Mod: S$PBB,,, | Performed by: INTERNAL MEDICINE

## 2019-11-05 PROCEDURE — 99999 PR PBB SHADOW E&M-EST. PATIENT-LVL III: ICD-10-PCS | Mod: PBBFAC,,, | Performed by: INTERNAL MEDICINE

## 2019-11-05 PROCEDURE — 99214 OFFICE O/P EST MOD 30 MIN: CPT | Mod: S$PBB,,, | Performed by: INTERNAL MEDICINE

## 2019-11-05 PROCEDURE — 99213 OFFICE O/P EST LOW 20 MIN: CPT | Mod: PBBFAC,PO | Performed by: INTERNAL MEDICINE

## 2019-11-05 PROCEDURE — 99999 PR PBB SHADOW E&M-EST. PATIENT-LVL III: CPT | Mod: PBBFAC,,, | Performed by: INTERNAL MEDICINE

## 2019-11-05 RX ORDER — PREDNISONE 20 MG/1
60 TABLET ORAL DAILY
Qty: 90 TABLET | Refills: 2 | Status: SHIPPED | OUTPATIENT
Start: 2019-11-05 | End: 2019-12-11

## 2019-11-06 NOTE — PROGRESS NOTES
"Subjective:       Patient ID: Giles Bravo is a 44 y.o.  female who presents for return patient evaluation for chronic renal failure.    I am covering for Dr. Panchal.    She is back on prednisone due to a relapse in her disease.  However, she took it upon herself to cut her dose from 60 mg to 40 mg after discharge.  She has lost more than 12 pounds in a week since discharge and has improved edema.  Her blood glucose seems to be holding for now.      Review of Systems   Constitutional: Positive for fatigue.   HENT: Negative for congestion.    Respiratory: Positive for shortness of breath (with exertion).    Cardiovascular: Positive for leg swelling.   Gastrointestinal: Negative for diarrhea and nausea.   Genitourinary: Negative for decreased urine volume and difficulty urinating.   Musculoskeletal: Negative for arthralgias.   Neurological: Negative for headaches.   Psychiatric/Behavioral: Negative for confusion.       The past medical, family and social histories were reviewed for this encounter.     /72 (BP Location: Right arm, Patient Position: Sitting)   Pulse 90   Ht 5' 4" (1.626 m)   Wt 63.3 kg (139 lb 8.8 oz)   LMP  (LMP Unknown)   SpO2 97%   BMI 23.95 kg/m²     Objective:      Physical Exam   Constitutional: She is oriented to person, place, and time. No distress.   HENT:   Head: Normocephalic and atraumatic.   Mild periorbital edema   Eyes: No scleral icterus.   Neck: No JVD present.   Cardiovascular: Normal rate and regular rhythm.   No murmur heard.  Pulmonary/Chest: Effort normal.   Diminished on R c/w L   Abdominal: Soft. She exhibits distension (mildly).   Musculoskeletal: She exhibits edema.   Neurological: She is alert and oriented to person, place, and time.   Skin: Skin is warm and dry.   Psychiatric: She has a normal mood and affect.   Vitals reviewed.      Assessment:       1. Minimal change disease    2. Nephrotic range proteinuria    3. Hypertriglyceridemia    4. " Pleural effusion on right        Plan:   Return to clinic in 1 month with Dr. Garnica.  Labs for next visit include rp, ua, upc, lipids in [place].  Baseline creatinine is 0.8.  UPC has dropped from 23 to 15 thus far.  She sees Pulmonary Friday for possible R thoracentesis.  She may need anticoagulation for her nephrotic syndrome but I will not initiate at this time as she does not seem very reliable with a therapy plan since she has already cut her own prednisone dose.    Use 60 mg daily of prednisone for 3 months.  Then I would taper off over a three month period.  This is assuming she has an excellent response.  If she does not, I will offer rebiopsy.  If she refuses this I would offer either cytotoxics or rituxan.     Use lasix 20-40 mg either daily or BID according to her edema.     She will need treatment for her secondary hypertriglyceridemia

## 2019-11-21 ENCOUNTER — HOSPITAL ENCOUNTER (OUTPATIENT)
Dept: RADIOLOGY | Facility: HOSPITAL | Age: 44
Discharge: HOME | End: 2019-11-21
Attending: INTERNAL MEDICINE
Payer: OTHER GOVERNMENT

## 2019-11-21 DIAGNOSIS — E04.1 NONTOXIC UNINODULAR GOITER: ICD-10-CM

## 2019-11-21 PROCEDURE — 76536 US THYROID: ICD-10-PCS | Mod: 26,,, | Performed by: RADIOLOGY

## 2019-11-21 PROCEDURE — 76536 US EXAM OF HEAD AND NECK: CPT | Mod: TC,PO

## 2019-11-21 PROCEDURE — 76536 US EXAM OF HEAD AND NECK: CPT | Mod: 26,,, | Performed by: RADIOLOGY

## 2019-12-03 ENCOUNTER — HOSPITAL ENCOUNTER (OUTPATIENT)
Dept: RADIOLOGY | Facility: HOSPITAL | Age: 44
Discharge: HOME | End: 2019-12-03
Attending: INTERNAL MEDICINE
Payer: OTHER GOVERNMENT

## 2019-12-03 DIAGNOSIS — I89.8: ICD-10-CM

## 2019-12-03 PROCEDURE — 71046 X-RAY EXAM CHEST 2 VIEWS: CPT | Mod: TC,PO

## 2019-12-03 PROCEDURE — 71046 X-RAY EXAM CHEST 2 VIEWS: CPT | Mod: 26,,, | Performed by: RADIOLOGY

## 2019-12-03 PROCEDURE — 71046 XR CHEST PA AND LATERAL: ICD-10-PCS | Mod: 26,,, | Performed by: RADIOLOGY

## 2019-12-11 ENCOUNTER — OFFICE VISIT (OUTPATIENT)
Dept: NEPHROLOGY | Facility: CLINIC | Age: 44
End: 2019-12-11
Payer: OTHER GOVERNMENT

## 2019-12-11 VITALS
HEART RATE: 89 BPM | SYSTOLIC BLOOD PRESSURE: 112 MMHG | DIASTOLIC BLOOD PRESSURE: 72 MMHG | OXYGEN SATURATION: 99 % | HEIGHT: 64 IN | BODY MASS INDEX: 21.71 KG/M2 | WEIGHT: 127.19 LBS

## 2019-12-11 DIAGNOSIS — N05.0 MINIMAL CHANGE DISEASE: Primary | ICD-10-CM

## 2019-12-11 DIAGNOSIS — R80.9 NEPHROTIC RANGE PROTEINURIA: ICD-10-CM

## 2019-12-11 PROCEDURE — 99999 PR PBB SHADOW E&M-EST. PATIENT-LVL III: CPT | Mod: PBBFAC,,, | Performed by: INTERNAL MEDICINE

## 2019-12-11 PROCEDURE — 99213 OFFICE O/P EST LOW 20 MIN: CPT | Mod: PBBFAC,PO | Performed by: INTERNAL MEDICINE

## 2019-12-11 PROCEDURE — 99214 OFFICE O/P EST MOD 30 MIN: CPT | Mod: S$PBB,,, | Performed by: INTERNAL MEDICINE

## 2019-12-11 PROCEDURE — 99999 PR PBB SHADOW E&M-EST. PATIENT-LVL III: ICD-10-PCS | Mod: PBBFAC,,, | Performed by: INTERNAL MEDICINE

## 2019-12-11 PROCEDURE — 99214 PR OFFICE/OUTPT VISIT, EST, LEVL IV, 30-39 MIN: ICD-10-PCS | Mod: S$PBB,,, | Performed by: INTERNAL MEDICINE

## 2019-12-11 RX ORDER — PREDNISONE 20 MG/1
30 TABLET ORAL DAILY
Qty: 60 TABLET | Refills: 2 | Status: SHIPPED | OUTPATIENT
Start: 2019-12-11 | End: 2020-02-21

## 2019-12-11 NOTE — PROGRESS NOTES
"Subjective:       Patient ID: Giles Bravo is a 44 y.o.  female who presents for return patient evaluation for chronic renal failure.    Sheis feeling much, much better and has lost 12 more pounds of edema.  She is doing well and has more energy.    Review of Systems   Constitutional: Negative for fatigue.   HENT: Negative for congestion.    Respiratory: Negative for shortness of breath.    Cardiovascular: Negative for leg swelling.   Gastrointestinal: Negative for diarrhea and nausea.   Genitourinary: Negative for decreased urine volume and difficulty urinating.   Musculoskeletal: Negative for arthralgias.   Neurological: Negative for headaches.   Psychiatric/Behavioral: Negative for confusion.       The past medical, family and social histories were reviewed for this encounter.     /72 (BP Location: Left arm, Patient Position: Sitting)   Pulse 89   Ht 5' 4" (1.626 m)   Wt 57.7 kg (127 lb 3.3 oz)   LMP  (LMP Unknown)   SpO2 99%   BMI 21.83 kg/m²     Objective:      Physical Exam   Constitutional: She is oriented to person, place, and time. No distress.   HENT:   Head: Normocephalic and atraumatic.   Eyes: No scleral icterus.   Neck: No JVD present.   Cardiovascular: Normal rate and regular rhythm.   No murmur heard.  Pulmonary/Chest: Effort normal and breath sounds normal.   Abdominal: Soft. She exhibits no distension.   Musculoskeletal: She exhibits no edema.   Neurological: She is alert and oriented to person, place, and time.   Skin: Skin is warm and dry.   Psychiatric: She has a normal mood and affect.   Vitals reviewed.      Assessment:       1. Minimal change disease    2. Nephrotic range proteinuria        Plan:   Return to clinic in 6 weeks with either me or Dr. Panchal.  Labs for next visit include rp, ua, upc, lipids in [place].  Baseline creatinine is 0.8.  UPC has dropped to normal thus she has responded very well to re-treatment.  She restarted prednisone on 10/21.  Drop to " 30 mg daily of prednisone for 6 weeks.  Then I will continue to taper over a three month period.  At this time she does not require either cytotoxics or rituxan.  She no longer needs lasix.  Her albumin is much better as is her secondary hyperlipidemia.

## 2019-12-11 NOTE — PATIENT INSTRUCTIONS
Take 30 mg of prednisone for 6 weeks.    (1 1/2 pills a day)    Urine protein is gone/better    Cholesterol is much better    Kidney function is good

## 2020-01-15 ENCOUNTER — LAB VISIT (OUTPATIENT)
Dept: LAB | Facility: HOSPITAL | Age: 45
End: 2020-01-15
Attending: FAMILY MEDICINE
Payer: OTHER GOVERNMENT

## 2020-01-15 DIAGNOSIS — R80.9 NEPHROTIC RANGE PROTEINURIA: ICD-10-CM

## 2020-01-15 DIAGNOSIS — N05.0 MINIMAL CHANGE DISEASE: ICD-10-CM

## 2020-01-15 PROCEDURE — 36415 COLL VENOUS BLD VENIPUNCTURE: CPT | Mod: PO

## 2020-01-15 PROCEDURE — 80061 LIPID PANEL: CPT

## 2020-01-15 PROCEDURE — 80069 RENAL FUNCTION PANEL: CPT

## 2020-01-16 LAB
ALBUMIN SERPL BCP-MCNC: 1.6 G/DL (ref 3.5–5.2)
ANION GAP SERPL CALC-SCNC: 6 MMOL/L (ref 8–16)
BUN SERPL-MCNC: 15 MG/DL (ref 6–20)
CALCIUM SERPL-MCNC: 8.3 MG/DL (ref 8.7–10.5)
CHLORIDE SERPL-SCNC: 101 MMOL/L (ref 95–110)
CHOLEST SERPL-MCNC: 375 MG/DL (ref 120–199)
CHOLEST/HDLC SERPL: 2.9 {RATIO} (ref 2–5)
CO2 SERPL-SCNC: 33 MMOL/L (ref 23–29)
CREAT SERPL-MCNC: 0.8 MG/DL (ref 0.5–1.4)
EST. GFR  (AFRICAN AMERICAN): >60 ML/MIN/1.73 M^2
EST. GFR  (NON AFRICAN AMERICAN): >60 ML/MIN/1.73 M^2
GLUCOSE SERPL-MCNC: 165 MG/DL (ref 70–110)
HDLC SERPL-MCNC: 128 MG/DL (ref 40–75)
HDLC SERPL: 34.1 % (ref 20–50)
LDLC SERPL CALC-MCNC: 194.8 MG/DL (ref 63–159)
NONHDLC SERPL-MCNC: 247 MG/DL
PHOSPHATE SERPL-MCNC: 3.6 MG/DL (ref 2.7–4.5)
POTASSIUM SERPL-SCNC: 3.2 MMOL/L (ref 3.5–5.1)
SODIUM SERPL-SCNC: 140 MMOL/L (ref 136–145)
TRIGL SERPL-MCNC: 261 MG/DL (ref 30–150)

## 2020-01-22 ENCOUNTER — OFFICE VISIT (OUTPATIENT)
Dept: NEPHROLOGY | Facility: CLINIC | Age: 45
End: 2020-01-22
Payer: OTHER GOVERNMENT

## 2020-01-22 ENCOUNTER — LAB VISIT (OUTPATIENT)
Dept: LAB | Facility: HOSPITAL | Age: 45
End: 2020-01-22
Attending: INTERNAL MEDICINE
Payer: OTHER GOVERNMENT

## 2020-01-22 VITALS
SYSTOLIC BLOOD PRESSURE: 130 MMHG | HEART RATE: 71 BPM | HEIGHT: 64 IN | DIASTOLIC BLOOD PRESSURE: 82 MMHG | WEIGHT: 137.56 LBS | OXYGEN SATURATION: 98 % | BODY MASS INDEX: 23.49 KG/M2

## 2020-01-22 DIAGNOSIS — R80.9 NEPHROTIC RANGE PROTEINURIA: ICD-10-CM

## 2020-01-22 DIAGNOSIS — N05.0 MINIMAL CHANGE DISEASE: Primary | ICD-10-CM

## 2020-01-22 DIAGNOSIS — N05.0 MINIMAL CHANGE DISEASE: ICD-10-CM

## 2020-01-22 LAB
AMORPH CRY URNS QL MICRO: ABNORMAL
BACTERIA #/AREA URNS HPF: ABNORMAL /HPF
BILIRUB UR QL STRIP: NEGATIVE
CLARITY UR: CLEAR
COLOR UR: YELLOW
GLUCOSE UR QL STRIP: NEGATIVE
HGB UR QL STRIP: ABNORMAL
HYALINE CASTS #/AREA URNS LPF: 0 /LPF
KETONES UR QL STRIP: NEGATIVE
LEUKOCYTE ESTERASE UR QL STRIP: NEGATIVE
MICROSCOPIC COMMENT: ABNORMAL
NITRITE UR QL STRIP: NEGATIVE
PH UR STRIP: 7 [PH] (ref 5–8)
PROT UR QL STRIP: ABNORMAL
RBC #/AREA URNS HPF: 1 /HPF (ref 0–4)
SP GR UR STRIP: 1.02 (ref 1–1.03)
URN SPEC COLLECT METH UR: ABNORMAL
WBC #/AREA URNS HPF: 3 /HPF (ref 0–5)

## 2020-01-22 PROCEDURE — 99999 PR PBB SHADOW E&M-EST. PATIENT-LVL III: ICD-10-PCS | Mod: PBBFAC,,, | Performed by: INTERNAL MEDICINE

## 2020-01-22 PROCEDURE — 99214 PR OFFICE/OUTPT VISIT, EST, LEVL IV, 30-39 MIN: ICD-10-PCS | Mod: S$PBB,,, | Performed by: INTERNAL MEDICINE

## 2020-01-22 PROCEDURE — 99999 PR PBB SHADOW E&M-EST. PATIENT-LVL III: CPT | Mod: PBBFAC,,, | Performed by: INTERNAL MEDICINE

## 2020-01-22 PROCEDURE — 87086 URINE CULTURE/COLONY COUNT: CPT

## 2020-01-22 PROCEDURE — 81000 URINALYSIS NONAUTO W/SCOPE: CPT | Mod: PO

## 2020-01-22 PROCEDURE — 99214 OFFICE O/P EST MOD 30 MIN: CPT | Mod: S$PBB,,, | Performed by: INTERNAL MEDICINE

## 2020-01-22 PROCEDURE — 99213 OFFICE O/P EST LOW 20 MIN: CPT | Mod: PBBFAC,PO | Performed by: INTERNAL MEDICINE

## 2020-01-22 RX ORDER — AMOXICILLIN 500 MG/1
500 CAPSULE ORAL EVERY 8 HOURS
Qty: 21 CAPSULE | Refills: 0 | Status: SHIPPED | OUTPATIENT
Start: 2020-01-22 | End: 2020-02-21

## 2020-01-22 RX ORDER — CYCLOSPORINE 100 MG/1
100 CAPSULE, LIQUID FILLED ORAL 2 TIMES DAILY
Qty: 60 CAPSULE | Refills: 1 | Status: SHIPPED | OUTPATIENT
Start: 2020-01-22 | End: 2020-02-21 | Stop reason: SDUPTHER

## 2020-01-22 NOTE — PROGRESS NOTES
"Subjective:       Patient ID: Giles Bravo is a 44 y.o.  female who presents for return patient evaluation for chronic renal failure.    She has had the return of edema over the past two weeks.  She is slowly tapering her steroids.  She also states that she is having some dysuria over the past two days.   Her edema is more in her thighs and abdomen rather than her feet.      Review of Systems   Constitutional: Negative for fatigue.   HENT: Negative for congestion.    Respiratory: Negative for shortness of breath.    Cardiovascular: Positive for leg swelling.   Gastrointestinal: Negative for diarrhea and nausea.   Genitourinary: Positive for dysuria. Negative for decreased urine volume and difficulty urinating.   Musculoskeletal: Negative for arthralgias.   Neurological: Negative for headaches.   Psychiatric/Behavioral: Negative for confusion.       The past medical, family and social histories were reviewed for this encounter.     /82 (BP Location: Left arm, Patient Position: Sitting)   Pulse 71   Ht 5' 4" (1.626 m)   Wt 62.4 kg (137 lb 9.1 oz)   LMP  (LMP Unknown)   SpO2 98%   BMI 23.61 kg/m²     Objective:      Physical Exam   Constitutional: She is oriented to person, place, and time. No distress.   HENT:   Head: Normocephalic and atraumatic.   Eyes: No scleral icterus.   Neck: No JVD present.   Cardiovascular: Normal rate and regular rhythm.   No murmur heard.  Pulmonary/Chest: Effort normal and breath sounds normal.   Abdominal: Soft. She exhibits no distension.   Musculoskeletal: She exhibits edema (trace BLE).   Neurological: She is alert and oriented to person, place, and time.   Skin: Skin is warm and dry.   Psychiatric: She has a normal mood and affect.   Vitals reviewed.      Assessment:       1. Minimal change disease    2. Nephrotic range proteinuria        Plan:   Return to clinic in 4 weeks.  Labs for next visit include rp, upc in [place].  UA and UC today.  Baseline " creatinine is 0.8.  UPC has dropped to normal thus she has responded very well to re-treatment but now has relapsed with 12.8 grams.  She restarted prednisone on 10/21/19.  Continue to taper her steroids.  We discussed other options for treatment thus we will start cyclosporine at 100 mg BID which we will look to continue for a total of 6 months.  I did dicuss and provide a handout regarding the side effects.  Use lasix prn.  We will watch her albumin and her secondary hyperlipidemia.

## 2020-01-23 LAB — BACTERIA UR CULT: NO GROWTH

## 2020-01-23 NOTE — TELEPHONE ENCOUNTER
----- Message from Elizabeth JOSE Amor sent at 1/23/2020  4:27 PM CST -----  Contact: Facundo Bravo (Spouse)  Type:  RX Refill Request    Who Called:  Facundo Bravo (Spouse)  Refill or New Rx:  Refill  RX Name and Strength:    furosemide (LASIX) 40 MG tablet  atorvastatin (LIPITOR) 20 MG tablet  levothyroxine (SYNTHROID) 88 MCG tablet  fenofibrate 160 MG Tab  How is the patient currently taking it? (ex. 1XDay): one by mouth  Is this a 30 day or 90 day RX:  30  Preferred Pharmacy with phone number:    [id number] - [place], [place] - [address] [facility] [address]  [address] [address]  Phone: [phone] Fax: [phone]    [facility] #[id number] - [place], [place] - [address] AT [facility] [address]  [address] [facility] [address]  Phone: [phone] Fax: [phone]  Local or Mail Order:  Local  Ordering Provider:  Dr. Rohith Llanes Call Back Number: [phone]  Additional Information:  Requesting the Provider to fill these RX.

## 2020-01-24 RX ORDER — ATORVASTATIN CALCIUM 20 MG/1
20 TABLET, FILM COATED ORAL DAILY
Qty: 30 TABLET | Refills: 6 | Status: SHIPPED | OUTPATIENT
Start: 2020-01-24 | End: 2020-02-21 | Stop reason: SDUPTHER

## 2020-01-24 RX ORDER — FENOFIBRATE 160 MG/1
160 TABLET ORAL DAILY
Qty: 30 TABLET | Refills: 6 | Status: SHIPPED | OUTPATIENT
Start: 2020-01-24 | End: 2020-02-21 | Stop reason: SDUPTHER

## 2020-01-24 RX ORDER — FUROSEMIDE 40 MG/1
40 TABLET ORAL DAILY
Qty: 60 TABLET | Refills: 6 | Status: SHIPPED | OUTPATIENT
Start: 2020-01-24 | End: 2020-02-21 | Stop reason: SDUPTHER

## 2020-01-24 RX ORDER — LEVOTHYROXINE SODIUM 88 UG/1
88 TABLET ORAL
Qty: 30 TABLET | Refills: 6 | Status: SHIPPED | OUTPATIENT
Start: 2020-01-24 | End: 2020-02-21 | Stop reason: SDUPTHER

## 2020-02-20 ENCOUNTER — LAB VISIT (OUTPATIENT)
Dept: LAB | Facility: HOSPITAL | Age: 45
End: 2020-02-20
Attending: INTERNAL MEDICINE
Payer: OTHER GOVERNMENT

## 2020-02-20 DIAGNOSIS — R80.9 NEPHROTIC RANGE PROTEINURIA: ICD-10-CM

## 2020-02-20 DIAGNOSIS — N05.0 MINIMAL CHANGE DISEASE: ICD-10-CM

## 2020-02-20 LAB
ALBUMIN SERPL BCP-MCNC: 1.2 G/DL (ref 3.5–5.2)
ANION GAP SERPL CALC-SCNC: 6 MMOL/L (ref 8–16)
BUN SERPL-MCNC: 13 MG/DL (ref 6–20)
CALCIUM SERPL-MCNC: 9 MG/DL (ref 8.7–10.5)
CHLORIDE SERPL-SCNC: 104 MMOL/L (ref 95–110)
CO2 SERPL-SCNC: 33 MMOL/L (ref 23–29)
CREAT SERPL-MCNC: 0.8 MG/DL (ref 0.5–1.4)
EST. GFR  (AFRICAN AMERICAN): >60 ML/MIN/1.73 M^2
EST. GFR  (NON AFRICAN AMERICAN): >60 ML/MIN/1.73 M^2
GLUCOSE SERPL-MCNC: 104 MG/DL (ref 70–110)
PHOSPHATE SERPL-MCNC: 4.4 MG/DL (ref 2.7–4.5)
POTASSIUM SERPL-SCNC: 3.7 MMOL/L (ref 3.5–5.1)
SODIUM SERPL-SCNC: 143 MMOL/L (ref 136–145)

## 2020-02-20 PROCEDURE — 36415 COLL VENOUS BLD VENIPUNCTURE: CPT | Mod: PO

## 2020-02-20 PROCEDURE — 80069 RENAL FUNCTION PANEL: CPT

## 2020-02-21 ENCOUNTER — OFFICE VISIT (OUTPATIENT)
Dept: NEPHROLOGY | Facility: CLINIC | Age: 45
End: 2020-02-21
Payer: OTHER GOVERNMENT

## 2020-02-21 VITALS
DIASTOLIC BLOOD PRESSURE: 90 MMHG | BODY MASS INDEX: 22.17 KG/M2 | WEIGHT: 129.88 LBS | HEART RATE: 83 BPM | HEIGHT: 64 IN | OXYGEN SATURATION: 98 % | SYSTOLIC BLOOD PRESSURE: 152 MMHG

## 2020-02-21 DIAGNOSIS — R80.9 NEPHROTIC RANGE PROTEINURIA: ICD-10-CM

## 2020-02-21 DIAGNOSIS — N05.0 MINIMAL CHANGE DISEASE: Primary | ICD-10-CM

## 2020-02-21 PROCEDURE — 99214 OFFICE O/P EST MOD 30 MIN: CPT | Mod: S$PBB,,, | Performed by: INTERNAL MEDICINE

## 2020-02-21 PROCEDURE — 99999 PR PBB SHADOW E&M-EST. PATIENT-LVL III: ICD-10-PCS | Mod: PBBFAC,,, | Performed by: INTERNAL MEDICINE

## 2020-02-21 PROCEDURE — 99213 OFFICE O/P EST LOW 20 MIN: CPT | Mod: PBBFAC,PO | Performed by: INTERNAL MEDICINE

## 2020-02-21 PROCEDURE — 99999 PR PBB SHADOW E&M-EST. PATIENT-LVL III: CPT | Mod: PBBFAC,,, | Performed by: INTERNAL MEDICINE

## 2020-02-21 PROCEDURE — 99214 PR OFFICE/OUTPT VISIT, EST, LEVL IV, 30-39 MIN: ICD-10-PCS | Mod: S$PBB,,, | Performed by: INTERNAL MEDICINE

## 2020-02-21 RX ORDER — HYDROGEN PEROXIDE 3 %
20 SOLUTION, NON-ORAL MISCELLANEOUS
COMMUNITY
End: 2020-02-21 | Stop reason: SDUPTHER

## 2020-02-21 RX ORDER — LEVOTHYROXINE SODIUM 88 UG/1
88 TABLET ORAL
Qty: 30 TABLET | Refills: 6 | Status: SHIPPED | OUTPATIENT
Start: 2020-02-21 | End: 2020-03-25 | Stop reason: SDUPTHER

## 2020-02-21 RX ORDER — CYCLOSPORINE 100 MG/1
100 CAPSULE, LIQUID FILLED ORAL 2 TIMES DAILY
Qty: 60 CAPSULE | Refills: 1 | Status: SHIPPED | OUTPATIENT
Start: 2020-02-21 | End: 2020-05-20 | Stop reason: SDUPTHER

## 2020-02-21 RX ORDER — FUROSEMIDE 40 MG/1
40 TABLET ORAL DAILY
Qty: 60 TABLET | Refills: 6 | Status: SHIPPED | OUTPATIENT
Start: 2020-02-21 | End: 2020-12-31

## 2020-02-21 RX ORDER — HYDROGEN PEROXIDE 3 %
20 SOLUTION, NON-ORAL MISCELLANEOUS
Qty: 30 CAPSULE | Refills: 6 | Status: SHIPPED | OUTPATIENT
Start: 2020-02-21 | End: 2020-09-17 | Stop reason: SDUPTHER

## 2020-02-21 RX ORDER — ATORVASTATIN CALCIUM 20 MG/1
20 TABLET, FILM COATED ORAL DAILY
Qty: 30 TABLET | Refills: 6 | Status: SHIPPED | OUTPATIENT
Start: 2020-02-21 | End: 2020-09-17 | Stop reason: SDUPTHER

## 2020-02-21 RX ORDER — FENOFIBRATE 160 MG/1
160 TABLET ORAL DAILY
Qty: 30 TABLET | Refills: 6 | Status: SHIPPED | OUTPATIENT
Start: 2020-02-21 | End: 2020-09-17 | Stop reason: SDUPTHER

## 2020-02-21 NOTE — PROGRESS NOTES
"Subjective:       Patient ID: Giles Bravo is a 44 y.o.  female who presents for return patient evaluation for chronic renal failure.    She has had improvement in her edema with 10 pound weight loss.  She is slowly tapering her steroids.  She also states that she is having some dysuria over the past two days.         Review of Systems   Constitutional: Negative for fatigue.   HENT: Negative for congestion.    Respiratory: Negative for shortness of breath.    Cardiovascular: Positive for leg swelling.   Gastrointestinal: Negative for diarrhea and nausea.   Genitourinary: Positive for dysuria. Negative for decreased urine volume and difficulty urinating.   Musculoskeletal: Negative for arthralgias.   Neurological: Negative for headaches.   Psychiatric/Behavioral: Negative for confusion.       The past medical, family and social histories were reviewed for this encounter.     BP (!) 152/90 (BP Location: Right arm, Patient Position: Sitting)   Pulse 83   Ht 5' 4" (1.626 m)   Wt 58.9 kg (129 lb 13.6 oz)   LMP  (LMP Unknown)   SpO2 98%   BMI 22.29 kg/m²     Objective:      Physical Exam   Constitutional: She is oriented to person, place, and time. No distress.   HENT:   Head: Normocephalic and atraumatic.   Eyes: No scleral icterus.   Neck: No JVD present.   Cardiovascular: Normal rate and regular rhythm.   No murmur heard.  Pulmonary/Chest: Effort normal and breath sounds normal.   Abdominal: Soft. She exhibits no distension.   Musculoskeletal: She exhibits edema (trace BLE).   Neurological: She is alert and oriented to person, place, and time.   Skin: Skin is warm and dry.   Psychiatric: She has a normal mood and affect.   Vitals reviewed.      Assessment:       1. Minimal change disease    2. Nephrotic range proteinuria        Plan:   Return to clinic on 3/17.  Labs for next visit include rp, ua, upc, lipids in [place].    Baseline creatinine is 0.8.  UPC has dropped to normal thus she has " responded very well to re-treatment but now has relapsed with 12.8 grams.  She has now dropped back to 7.2 grams of proteinuria.  She restarted prednisone on 10/21/19.  Continue to taper her steroids.  We discussed other options for treatment thus we will start cyclosporine at 100 mg BID which we will look to continue for a total of 6 months.  I did dicuss and provide a handout regarding the side effects.  Use lasix prn.  We will watch her albumin and her secondary hyperlipidemia.  We have discussed the issue of hypercoagulability but she is not anxious to start blood thinners.

## 2020-03-23 ENCOUNTER — TELEPHONE (OUTPATIENT)
Dept: NEPHROLOGY | Facility: CLINIC | Age: 45
End: 2020-03-23

## 2020-03-23 DIAGNOSIS — N05.0 MINIMAL CHANGE DISEASE: Primary | ICD-10-CM

## 2020-03-23 DIAGNOSIS — R80.9 NEPHROTIC RANGE PROTEINURIA: ICD-10-CM

## 2020-03-23 NOTE — TELEPHONE ENCOUNTER
"The patient and  wants to ask Dr Newberry if they can follow up in 2 months.  The  states he has PTSD and that he is "nervous."    I spoke with wife.  There is a little bit of a language barrier, but I repeated back to her from our conversation:    Do not order cyclosporine because she didn't start taking them, as she got them a week after her visit and she felt yudi.      125lb is her weight and she is staying around there almost one month.    "

## 2020-03-25 DIAGNOSIS — E03.9 HYPOTHYROIDISM, UNSPECIFIED TYPE: Primary | ICD-10-CM

## 2020-03-25 RX ORDER — LEVOTHYROXINE SODIUM 88 UG/1
88 TABLET ORAL
Qty: 30 TABLET | Refills: 6 | Status: SHIPPED | OUTPATIENT
Start: 2020-03-25 | End: 2020-10-16 | Stop reason: SDUPTHER

## 2020-03-25 NOTE — TELEPHONE ENCOUNTER
----- Message from Guadalupe Cabrera sent at 3/25/2020 10:08 AM CDT -----    Type:  RX Refill Request    Who Called:  Pt   sony  Refill   RX Name and Strength:  levothyroxine 88 mg/  And  Fluid   Pill  Needs refills  also  How is the patient currently taking it? (ex. 1XDay):  1  A day  Is this a 30 day or 90 day RX: 30  days  Preferred Pharmacy with phone number:    [facility] #[id number] - [facility] [address]  AT [facility] [address]  [employer] [address] [facility] [address]  Phone: [phone] Fax: [phone]  Best Call Back Number: [phone]  Additional Information:  Please call  For details

## 2020-05-20 DIAGNOSIS — R80.9 NEPHROTIC RANGE PROTEINURIA: Primary | ICD-10-CM

## 2020-05-20 RX ORDER — CYCLOSPORINE 100 MG/1
100 CAPSULE, LIQUID FILLED ORAL 2 TIMES DAILY
Qty: 60 CAPSULE | Refills: 0 | Status: SHIPPED | OUTPATIENT
Start: 2020-05-20 | End: 2020-06-05 | Stop reason: SDUPTHER

## 2020-05-22 ENCOUNTER — LAB VISIT (OUTPATIENT)
Dept: LAB | Facility: HOSPITAL | Age: 45
End: 2020-05-22
Attending: INTERNAL MEDICINE
Payer: OTHER GOVERNMENT

## 2020-05-22 DIAGNOSIS — R80.9 NEPHROTIC RANGE PROTEINURIA: ICD-10-CM

## 2020-05-22 DIAGNOSIS — N05.0 MINIMAL CHANGE DISEASE: ICD-10-CM

## 2020-05-22 PROCEDURE — 80061 LIPID PANEL: CPT

## 2020-05-22 PROCEDURE — 36415 COLL VENOUS BLD VENIPUNCTURE: CPT | Mod: PO

## 2020-05-22 PROCEDURE — 80069 RENAL FUNCTION PANEL: CPT

## 2020-05-23 LAB
ALBUMIN SERPL BCP-MCNC: 1.8 G/DL (ref 3.5–5.2)
ANION GAP SERPL CALC-SCNC: 6 MMOL/L (ref 8–16)
BUN SERPL-MCNC: 20 MG/DL (ref 6–20)
CALCIUM SERPL-MCNC: 8.6 MG/DL (ref 8.7–10.5)
CHLORIDE SERPL-SCNC: 106 MMOL/L (ref 95–110)
CHOLEST SERPL-MCNC: 531 MG/DL (ref 120–199)
CHOLEST/HDLC SERPL: 7.2 {RATIO} (ref 2–5)
CO2 SERPL-SCNC: 30 MMOL/L (ref 23–29)
CREAT SERPL-MCNC: 0.8 MG/DL (ref 0.5–1.4)
EST. GFR  (AFRICAN AMERICAN): >60 ML/MIN/1.73 M^2
EST. GFR  (NON AFRICAN AMERICAN): >60 ML/MIN/1.73 M^2
GLUCOSE SERPL-MCNC: 109 MG/DL (ref 70–110)
HDLC SERPL-MCNC: 74 MG/DL (ref 40–75)
HDLC SERPL: 13.9 % (ref 20–50)
LDLC SERPL CALC-MCNC: ABNORMAL MG/DL (ref 63–159)
NONHDLC SERPL-MCNC: 457 MG/DL
PHOSPHATE SERPL-MCNC: 3.4 MG/DL (ref 2.7–4.5)
POTASSIUM SERPL-SCNC: 3.8 MMOL/L (ref 3.5–5.1)
SODIUM SERPL-SCNC: 142 MMOL/L (ref 136–145)
TRIGL SERPL-MCNC: 510 MG/DL (ref 30–150)

## 2020-06-05 DIAGNOSIS — R80.9 NEPHROTIC RANGE PROTEINURIA: ICD-10-CM

## 2020-06-05 NOTE — TELEPHONE ENCOUNTER
----- Message from Giancarlo De Los Santos sent at 6/5/2020 12:30 PM CDT -----  Contact: self   Patient need a refill on cycloSPORINE please send to [facility], any questions please call back at [phone] (home)     Case number [id number]  [id number] - [place] - [facility] [address] AT [facility] [address] [place] [address] [address] [facility] [address]  Phone: [phone] Fax: [phone]

## 2020-06-06 RX ORDER — CYCLOSPORINE 100 MG/1
100 CAPSULE, LIQUID FILLED ORAL 2 TIMES DAILY
Qty: 60 CAPSULE | Refills: 0 | Status: SHIPPED | OUTPATIENT
Start: 2020-06-06 | End: 2020-08-25 | Stop reason: SDUPTHER

## 2020-08-25 DIAGNOSIS — R80.9 NEPHROTIC RANGE PROTEINURIA: ICD-10-CM

## 2020-08-25 RX ORDER — CYCLOSPORINE 100 MG/1
100 CAPSULE, LIQUID FILLED ORAL 2 TIMES DAILY
Qty: 60 CAPSULE | Refills: 0 | Status: SHIPPED | OUTPATIENT
Start: 2020-08-25 | End: 2020-08-25

## 2020-08-25 RX ORDER — CYCLOSPORINE 100 MG/1
100 CAPSULE, LIQUID FILLED ORAL 2 TIMES DAILY
Qty: 60 CAPSULE | Refills: 0 | Status: SHIPPED | OUTPATIENT
Start: 2020-08-25 | End: 2021-01-12

## 2020-08-25 NOTE — TELEPHONE ENCOUNTER
The last visit was supposed to be a [facility]ity call but I think it may have been scheduled/documented incorrectly.     I rescheduled them for 9/4 labs and 9/9 appointment.

## 2020-08-25 NOTE — TELEPHONE ENCOUNTER
----- Message from Shamir Bay sent at 8/25/2020  9:15 AM CDT -----  Regarding: Rx refill  Type:  RX Refill Request    Who Called:  navi Loredo [phone]    Refill or New Rx:  Refill    RX Name and Strength:  cycloSPORINE modified, NEORAL, (NEORAL) 100 MG capsule 60 capsule 0 6/6/2020 6/6/2021 No  Sig - Route: Take 1 capsule (100 mg total) by mouth 2 (two) times daily. - Oral  Sent to pharmacy as: cycloSPORINE modified, NEORAL, (NEORAL) 100 MG capsule  Class: Normal      How is the patient currently taking it? (ex. 1XDay):  See above    Is this a 30 day or 90 day RX:  See above    Preferred Pharmacy with phone number:     [email or web address] #[id number] - [facility] [address] AT [facility] OF [facility] & [place] [address] [address] [facility] [address]  Phone: [phone] Fax: [phone]        Local or Mail Order:  Local    Ordering Provider:  Dr Rohith Llanes Call Back Number:  navi Loredo [phone]      Additional Information:      Advised ptnt is in short supply at this time, please send to pharmacy as soon as possible.

## 2020-09-08 ENCOUNTER — TELEPHONE (OUTPATIENT)
Dept: NEPHROLOGY | Facility: CLINIC | Age: 45
End: 2020-09-08

## 2020-09-08 NOTE — TELEPHONE ENCOUNTER
----- Message from Nina Kurtz sent at 9/8/2020 11:01 AM CDT -----  Contact: /Facundo/[phone] (home)  Type: Needs Medical Advice  Who Called:  /Facundo/[phone] (home)     Symptoms (please be specific):  Cannot defecate, not eating well, feels like throwing up  How long has patient had these symptoms:  4 days  Pharmacy name and phone #:    [facility] #[id number] - [place], [place] - [address]  AT [facility] OF [facility] [address]  [address] [facility] [address]  Phone: [phone] Fax: [phone]    Additional Information: Please call to advise.

## 2020-09-09 ENCOUNTER — OFFICE VISIT (OUTPATIENT)
Dept: NEPHROLOGY | Facility: CLINIC | Age: 45
End: 2020-09-09
Payer: OTHER GOVERNMENT

## 2020-09-09 DIAGNOSIS — R80.9 NEPHROTIC RANGE PROTEINURIA: ICD-10-CM

## 2020-09-09 DIAGNOSIS — N05.0 MINIMAL CHANGE DISEASE: Primary | ICD-10-CM

## 2020-09-09 PROCEDURE — 99499 UNLISTED E&M SERVICE: CPT | Mod: 95,,, | Performed by: INTERNAL MEDICINE

## 2020-09-09 PROCEDURE — 99499 NO LOS: ICD-10-PCS | Mod: 95,,, | Performed by: INTERNAL MEDICINE

## 2020-09-10 ENCOUNTER — TELEPHONE (OUTPATIENT)
Dept: NEPHROLOGY | Facility: CLINIC | Age: 45
End: 2020-09-10

## 2020-09-10 NOTE — TELEPHONE ENCOUNTER
I would direct her to her PCP for those symptoms or urgent care.  She does need to get her labs prior to seeing me in clinic as she has missed her last appointment a while back.

## 2020-09-10 NOTE — TELEPHONE ENCOUNTER
----- Message from Germania Guallpa sent at 9/10/2020  2:01 PM CDT -----  Contact: Facundo Bravo (spouse)  [phone]  Patient is returning a phone call.  Who left a message for the patient: Guadalupe Johnson   Does patient know what this is regarding:   Comments:

## 2020-09-10 NOTE — TELEPHONE ENCOUNTER
Spoke to pt and - pt. Will go to urgent care and have labs drawn on Friday and have a f/u with Dr. Newberry on 9/16 at 9am - they are both aware

## 2020-09-10 NOTE — TELEPHONE ENCOUNTER
Spoke to pts. - he agreed to bring wife to urgent care and pt. Will get labs drawn for Dr. Newberry on Friday and will follow up with Dr. Newberry in clinic either VV or in person.

## 2020-09-10 NOTE — TELEPHONE ENCOUNTER
Pt will go to urgent care as advised.  pts.  agreed to urgent care and will get labs on Friday for Dr. Newberry and  will follow up with Dr newberry in clinic.

## 2020-09-11 ENCOUNTER — LAB VISIT (OUTPATIENT)
Dept: LAB | Facility: HOSPITAL | Age: 45
End: 2020-09-11
Attending: INTERNAL MEDICINE
Payer: OTHER GOVERNMENT

## 2020-09-11 DIAGNOSIS — R80.9 NEPHROTIC RANGE PROTEINURIA: ICD-10-CM

## 2020-09-11 DIAGNOSIS — N05.0 MINIMAL CHANGE DISEASE: ICD-10-CM

## 2020-09-11 LAB
ALBUMIN SERPL BCP-MCNC: 1 G/DL (ref 3.5–5.2)
ANION GAP SERPL CALC-SCNC: 8 MMOL/L (ref 8–16)
BUN SERPL-MCNC: 35 MG/DL (ref 6–20)
CALCIUM SERPL-MCNC: 8.1 MG/DL (ref 8.7–10.5)
CHLORIDE SERPL-SCNC: 101 MMOL/L (ref 95–110)
CO2 SERPL-SCNC: 26 MMOL/L (ref 23–29)
CREAT SERPL-MCNC: 0.8 MG/DL (ref 0.5–1.4)
EST. GFR  (AFRICAN AMERICAN): >60 ML/MIN/1.73 M^2
EST. GFR  (NON AFRICAN AMERICAN): >60 ML/MIN/1.73 M^2
GLUCOSE SERPL-MCNC: 90 MG/DL (ref 70–110)
PHOSPHATE SERPL-MCNC: 4.2 MG/DL (ref 2.7–4.5)
POTASSIUM SERPL-SCNC: 4.2 MMOL/L (ref 3.5–5.1)
SODIUM SERPL-SCNC: 135 MMOL/L (ref 136–145)

## 2020-09-11 PROCEDURE — 36415 COLL VENOUS BLD VENIPUNCTURE: CPT | Mod: PO

## 2020-09-11 PROCEDURE — 80061 LIPID PANEL: CPT

## 2020-09-11 PROCEDURE — 80069 RENAL FUNCTION PANEL: CPT

## 2020-09-12 LAB
CHOLEST SERPL-MCNC: 731 MG/DL (ref 120–199)
CHOLEST/HDLC SERPL: 11.6 {RATIO} (ref 2–5)
HDLC SERPL-MCNC: 63 MG/DL (ref 40–75)
HDLC SERPL: 8.6 % (ref 20–50)
LDLC SERPL CALC-MCNC: 617.2 MG/DL (ref 63–159)
NONHDLC SERPL-MCNC: 668 MG/DL
TRIGL SERPL-MCNC: 254 MG/DL (ref 30–150)

## 2020-09-17 ENCOUNTER — OFFICE VISIT (OUTPATIENT)
Dept: NEPHROLOGY | Facility: CLINIC | Age: 45
End: 2020-09-17
Payer: OTHER GOVERNMENT

## 2020-09-17 VITALS
HEART RATE: 79 BPM | OXYGEN SATURATION: 98 % | SYSTOLIC BLOOD PRESSURE: 118 MMHG | HEIGHT: 64 IN | BODY MASS INDEX: 22.43 KG/M2 | DIASTOLIC BLOOD PRESSURE: 76 MMHG | WEIGHT: 131.38 LBS

## 2020-09-17 DIAGNOSIS — N05.0 MINIMAL CHANGE DISEASE: Primary | ICD-10-CM

## 2020-09-17 DIAGNOSIS — R80.9 NEPHROTIC RANGE PROTEINURIA: ICD-10-CM

## 2020-09-17 PROCEDURE — 99214 PR OFFICE/OUTPT VISIT, EST, LEVL IV, 30-39 MIN: ICD-10-PCS | Mod: S$PBB,,, | Performed by: INTERNAL MEDICINE

## 2020-09-17 PROCEDURE — 99214 OFFICE O/P EST MOD 30 MIN: CPT | Mod: S$PBB,,, | Performed by: INTERNAL MEDICINE

## 2020-09-17 PROCEDURE — 99999 PR PBB SHADOW E&M-EST. PATIENT-LVL IV: CPT | Mod: PBBFAC,,, | Performed by: INTERNAL MEDICINE

## 2020-09-17 PROCEDURE — 99214 OFFICE O/P EST MOD 30 MIN: CPT | Mod: PBBFAC,PO | Performed by: INTERNAL MEDICINE

## 2020-09-17 PROCEDURE — 99999 PR PBB SHADOW E&M-EST. PATIENT-LVL IV: ICD-10-PCS | Mod: PBBFAC,,, | Performed by: INTERNAL MEDICINE

## 2020-09-17 RX ORDER — FENOFIBRATE 160 MG/1
160 TABLET ORAL DAILY
Qty: 30 TABLET | Refills: 6 | Status: SHIPPED | OUTPATIENT
Start: 2020-09-17 | End: 2021-01-12

## 2020-09-17 RX ORDER — ATORVASTATIN CALCIUM 20 MG/1
20 TABLET, FILM COATED ORAL DAILY
Qty: 30 TABLET | Refills: 6 | Status: SHIPPED | OUTPATIENT
Start: 2020-09-17 | End: 2021-01-12 | Stop reason: SDUPTHER

## 2020-09-17 RX ORDER — HYDROGEN PEROXIDE 3 %
20 SOLUTION, NON-ORAL MISCELLANEOUS
Qty: 30 CAPSULE | Refills: 6 | Status: SHIPPED | OUTPATIENT
Start: 2020-09-17 | End: 2020-12-23 | Stop reason: SDUPTHER

## 2020-09-17 RX ORDER — PREDNISONE 20 MG/1
40 TABLET ORAL DAILY
Qty: 60 TABLET | Refills: 2 | Status: SHIPPED | OUTPATIENT
Start: 2020-09-17 | End: 2020-10-21 | Stop reason: SDUPTHER

## 2020-09-17 NOTE — PROGRESS NOTES
"Subjective:       Patient ID: Giles Bravo is a 45 y.o.  female who presents for return patient evaluation for chronic renal failure.    She has been lost to follow-up since February.   She states she is feeling well but does have some swelling in her right third digit on her hand.      Review of Systems   Constitutional: Negative for fatigue.   HENT: Negative for congestion.    Respiratory: Negative for shortness of breath.    Cardiovascular: Positive for leg swelling.   Gastrointestinal: Negative for diarrhea and nausea.   Genitourinary: Negative for decreased urine volume, difficulty urinating and dysuria.   Musculoskeletal: Negative for arthralgias.   Neurological: Negative for headaches.   Psychiatric/Behavioral: Negative for confusion.       The past medical, family and social histories were reviewed for this encounter.     /76 (BP Location: Right arm, Patient Position: Sitting)   Pulse 79   Ht 5' 4" (1.626 m)   Wt 59.6 kg (131 lb 6.3 oz)   LMP  (LMP Unknown)   SpO2 98%   BMI 22.55 kg/m²     Objective:      Physical Exam  Vitals signs reviewed.   Constitutional:       General: She is not in acute distress.  HENT:      Head: Normocephalic and atraumatic.   Eyes:      General: No scleral icterus.  Neck:      Vascular: No JVD.   Cardiovascular:      Rate and Rhythm: Normal rate and regular rhythm.      Heart sounds: No murmur.   Pulmonary:      Effort: Pulmonary effort is normal.      Breath sounds: Normal breath sounds.   Abdominal:      General: There is no distension.      Palpations: Abdomen is soft.   Skin:     General: Skin is warm and dry.   Neurological:      Mental Status: She is alert and oriented to person, place, and time.         Assessment:       1. Minimal change disease    2. Nephrotic range proteinuria        Plan:   Return to clinic in 1 month.  Labs for next visit include rp, ua, upc in [place].    Baseline creatinine is 0.8.  UPC is 5.6.  Albumin is 1.0.  She has " responded to steroids in the past.  Her albumin is low and she continues to have secondary hyperlipidemia.  We have discussed the issue of hypercoagulability but she does not want to start blood thinners.  Use lasix as needed for edema.  Stop cyclosporine.  Prednisone 40 mg daily for a month then 30 mg daily for a month.  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  I would like to achieve remission again with steroids then try rituxan since the patient does not like to take steroids and seems to relapse after stopping them each time.  Plan prednisone (9/17/20 start) 40 mg daily for 1 month then 30 mg for 2 weeks and 20 mg for 2 weeks then 20 mg QOD for 1 month then 10 mg QOD for 1 month.

## 2020-09-17 NOTE — PATIENT INSTRUCTIONS
Start prednisone 40 mg daily for a month then 30 mg for a month.  Stop cyclosporine  Use lasix only as needed  Get labs in 1 month.

## 2020-10-13 DIAGNOSIS — E03.9 HYPOTHYROIDISM, UNSPECIFIED TYPE: ICD-10-CM

## 2020-10-13 RX ORDER — LEVOTHYROXINE SODIUM 88 UG/1
88 TABLET ORAL
Qty: 30 TABLET | Refills: 11 | Status: CANCELLED | OUTPATIENT
Start: 2020-10-13

## 2020-10-15 ENCOUNTER — LAB VISIT (OUTPATIENT)
Dept: LAB | Facility: HOSPITAL | Age: 45
End: 2020-10-15
Attending: INTERNAL MEDICINE
Payer: OTHER GOVERNMENT

## 2020-10-15 DIAGNOSIS — N05.0 MINIMAL CHANGE DISEASE: ICD-10-CM

## 2020-10-15 LAB
ALBUMIN SERPL BCP-MCNC: 3.4 G/DL (ref 3.5–5.2)
ANION GAP SERPL CALC-SCNC: 7 MMOL/L (ref 8–16)
BUN SERPL-MCNC: 22 MG/DL (ref 6–20)
CALCIUM SERPL-MCNC: 9.2 MG/DL (ref 8.7–10.5)
CHLORIDE SERPL-SCNC: 101 MMOL/L (ref 95–110)
CO2 SERPL-SCNC: 29 MMOL/L (ref 23–29)
CREAT SERPL-MCNC: 0.7 MG/DL (ref 0.5–1.4)
EST. GFR  (AFRICAN AMERICAN): >60 ML/MIN/1.73 M^2
EST. GFR  (NON AFRICAN AMERICAN): >60 ML/MIN/1.73 M^2
GLUCOSE SERPL-MCNC: 315 MG/DL (ref 70–110)
PHOSPHATE SERPL-MCNC: 3 MG/DL (ref 2.7–4.5)
POTASSIUM SERPL-SCNC: 4.4 MMOL/L (ref 3.5–5.1)
SODIUM SERPL-SCNC: 137 MMOL/L (ref 136–145)

## 2020-10-15 PROCEDURE — 80069 RENAL FUNCTION PANEL: CPT

## 2020-10-15 PROCEDURE — 36415 COLL VENOUS BLD VENIPUNCTURE: CPT | Mod: PO

## 2020-10-16 DIAGNOSIS — E03.9 HYPOTHYROIDISM, UNSPECIFIED TYPE: ICD-10-CM

## 2020-10-19 RX ORDER — LEVOTHYROXINE SODIUM 88 UG/1
88 TABLET ORAL
Qty: 30 TABLET | Refills: 6 | Status: SHIPPED | OUTPATIENT
Start: 2020-10-19 | End: 2020-12-23 | Stop reason: SDUPTHER

## 2020-10-21 ENCOUNTER — OFFICE VISIT (OUTPATIENT)
Dept: NEPHROLOGY | Facility: CLINIC | Age: 45
End: 2020-10-21
Payer: OTHER GOVERNMENT

## 2020-10-21 VITALS
SYSTOLIC BLOOD PRESSURE: 130 MMHG | HEART RATE: 91 BPM | DIASTOLIC BLOOD PRESSURE: 84 MMHG | BODY MASS INDEX: 21 KG/M2 | WEIGHT: 123 LBS | OXYGEN SATURATION: 98 % | HEIGHT: 64 IN

## 2020-10-21 DIAGNOSIS — N05.0 MINIMAL CHANGE DISEASE: Primary | ICD-10-CM

## 2020-10-21 DIAGNOSIS — R80.9 NEPHROTIC RANGE PROTEINURIA: ICD-10-CM

## 2020-10-21 PROCEDURE — 99999 PR PBB SHADOW E&M-EST. PATIENT-LVL III: ICD-10-PCS | Mod: PBBFAC,,, | Performed by: INTERNAL MEDICINE

## 2020-10-21 PROCEDURE — 99213 OFFICE O/P EST LOW 20 MIN: CPT | Mod: PBBFAC,PO | Performed by: INTERNAL MEDICINE

## 2020-10-21 PROCEDURE — 99999 PR PBB SHADOW E&M-EST. PATIENT-LVL III: CPT | Mod: PBBFAC,,, | Performed by: INTERNAL MEDICINE

## 2020-10-21 PROCEDURE — 99214 OFFICE O/P EST MOD 30 MIN: CPT | Mod: S$PBB,,, | Performed by: INTERNAL MEDICINE

## 2020-10-21 PROCEDURE — 99214 PR OFFICE/OUTPT VISIT, EST, LEVL IV, 30-39 MIN: ICD-10-PCS | Mod: S$PBB,,, | Performed by: INTERNAL MEDICINE

## 2020-10-21 RX ORDER — PREDNISONE 20 MG/1
20 TABLET ORAL DAILY
Qty: 60 TABLET | Refills: 3 | Status: SHIPPED | OUTPATIENT
Start: 2020-10-21 | End: 2020-12-16

## 2020-10-21 NOTE — PROGRESS NOTES
"Subjective:       Patient ID: Giles Bravo is a 45 y.o.  female who presents for return patient evaluation for chronic renal failure.    She has been feeling much better and her edema has completely resolved.  She has no uremic or urinary symptoms and is in her usual state of health.  There have been no recent illnesses, hospitalizations or procedures.        Review of Systems   Constitutional: Negative for fatigue.   HENT: Negative for congestion.    Respiratory: Negative for shortness of breath.    Cardiovascular: Negative for leg swelling.   Gastrointestinal: Negative for diarrhea and nausea.   Genitourinary: Negative for decreased urine volume, difficulty urinating and dysuria.   Musculoskeletal: Negative for arthralgias.   Neurological: Negative for headaches.   Psychiatric/Behavioral: Negative for confusion.       The past medical, family and social histories were reviewed for this encounter.     /84 (BP Location: Left arm, Patient Position: Sitting)   Pulse 91   Ht 5' 4" (1.626 m)   Wt 55.8 kg (123 lb 0.3 oz)   LMP  (LMP Unknown)   SpO2 98%   BMI 21.12 kg/m²     Objective:      Physical Exam  Vitals signs reviewed.   Constitutional:       General: She is not in acute distress.  HENT:      Head: Normocephalic and atraumatic.   Eyes:      General: No scleral icterus.  Neck:      Vascular: No JVD.   Cardiovascular:      Rate and Rhythm: Normal rate and regular rhythm.      Heart sounds: No murmur.   Pulmonary:      Effort: Pulmonary effort is normal.      Breath sounds: Normal breath sounds.   Abdominal:      General: There is no distension.      Palpations: Abdomen is soft.   Skin:     General: Skin is warm and dry.   Neurological:      Mental Status: She is alert and oriented to person, place, and time.         Assessment:       1. Minimal change disease    2. Nephrotic range proteinuria        Plan:   Return to clinic in 2 months.  Labs for next visit include rp, lipids, upc in " [place].    Baseline creatinine is 0.8.  UPC is negative.  Albumin is 3.4.  She has responded to steroids again.  Prednisone 20 mg daily.  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  I would like to achieve remission again with steroids then try rituxan since the patient does not like to take steroids and seems to relapse after stopping them each time.  Plan prednisone (9/17/20 start) 40 mg daily for 1 month then 30 mg for 2 weeks and 20 mg for 2 weeks then 20 mg QOD for 1 month then 10 mg QOD for 1 month.

## 2020-12-09 ENCOUNTER — TELEPHONE (OUTPATIENT)
Dept: NEPHROLOGY | Facility: CLINIC | Age: 45
End: 2020-12-09

## 2020-12-09 NOTE — TELEPHONE ENCOUNTER
Thanks for that information. Be aware Dr Conner is open for new patient scheduling. Can someone in your office, please call them to assist with scheduling appropriately? She needs metformin refill.

## 2020-12-09 NOTE — TELEPHONE ENCOUNTER
----- Message from Melissa Good sent at 12/9/2020 10:20 AM CST -----  Contact: spouse  Type:  RX Refill Request    Who Called:  Facundo - spouse  Refill or New Rx:  refill  RX Name and Strength: metFORMIN (GLUCOPHAGE) 500 MG tablet   How is the patient currently taking it? (ex. 1XDay):  2 x day  Is this a 30 day or 90 day RX:  90  Preferred Pharmacy with phone number:    [id number] - [facility] [address] AT [facility] OF [facility] [address]  [address] [facility] [address]  Phone: [phone] Fax: [phone]      Local or Mail Order:  local  Ordering Provider:    Best Call Back Number:  [phone] (home)     Additional Information:

## 2020-12-09 NOTE — TELEPHONE ENCOUNTER
Appointment given for Dr Conner tomorrow to establish care.  Patient will get her lab for Dr Newberry tomorrow after Dr Conner visit (in case new PCP wants to add anything.)

## 2020-12-09 NOTE — TELEPHONE ENCOUNTER
Dr. Conner is no longer a primary care provider so the patient cannot establish care with him. We have 3 new providers in the office the patient can see. Dr. Conner only sees patients in a urgent care setting.

## 2020-12-16 ENCOUNTER — LAB VISIT (OUTPATIENT)
Dept: LAB | Facility: HOSPITAL | Age: 45
End: 2020-12-16
Attending: INTERNAL MEDICINE
Payer: OTHER GOVERNMENT

## 2020-12-16 ENCOUNTER — OFFICE VISIT (OUTPATIENT)
Dept: FAMILY MEDICINE | Facility: CLINIC | Age: 45
End: 2020-12-16
Payer: OTHER GOVERNMENT

## 2020-12-16 VITALS
HEART RATE: 88 BPM | WEIGHT: 130.63 LBS | HEIGHT: 64 IN | SYSTOLIC BLOOD PRESSURE: 130 MMHG | DIASTOLIC BLOOD PRESSURE: 92 MMHG | TEMPERATURE: 98 F | BODY MASS INDEX: 22.3 KG/M2

## 2020-12-16 DIAGNOSIS — E11.9 TYPE 2 DIABETES MELLITUS WITHOUT COMPLICATION, WITHOUT LONG-TERM CURRENT USE OF INSULIN: Primary | ICD-10-CM

## 2020-12-16 DIAGNOSIS — N05.0 MINIMAL CHANGE DISEASE: ICD-10-CM

## 2020-12-16 DIAGNOSIS — R80.9 NEPHROTIC RANGE PROTEINURIA: ICD-10-CM

## 2020-12-16 DIAGNOSIS — E03.9 HYPOTHYROIDISM, UNSPECIFIED TYPE: ICD-10-CM

## 2020-12-16 LAB
ALBUMIN SERPL BCP-MCNC: 4.1 G/DL (ref 3.5–5.2)
ANION GAP SERPL CALC-SCNC: 10 MMOL/L (ref 8–16)
BUN SERPL-MCNC: 16 MG/DL (ref 6–20)
CALCIUM SERPL-MCNC: 9.9 MG/DL (ref 8.7–10.5)
CHLORIDE SERPL-SCNC: 99 MMOL/L (ref 95–110)
CHOLEST SERPL-MCNC: 348 MG/DL (ref 120–199)
CHOLEST/HDLC SERPL: 5.6 {RATIO} (ref 2–5)
CO2 SERPL-SCNC: 28 MMOL/L (ref 23–29)
CREAT SERPL-MCNC: 0.8 MG/DL (ref 0.5–1.4)
EST. GFR  (AFRICAN AMERICAN): >60 ML/MIN/1.73 M^2
EST. GFR  (NON AFRICAN AMERICAN): >60 ML/MIN/1.73 M^2
GLUCOSE SERPL-MCNC: 204 MG/DL (ref 70–110)
HDLC SERPL-MCNC: 62 MG/DL (ref 40–75)
HDLC SERPL: 17.8 % (ref 20–50)
LDLC SERPL CALC-MCNC: ABNORMAL MG/DL (ref 63–159)
NONHDLC SERPL-MCNC: 286 MG/DL
PHOSPHATE SERPL-MCNC: 2.8 MG/DL (ref 2.7–4.5)
POTASSIUM SERPL-SCNC: 4.1 MMOL/L (ref 3.5–5.1)
SODIUM SERPL-SCNC: 137 MMOL/L (ref 136–145)
TRIGL SERPL-MCNC: 544 MG/DL (ref 30–150)

## 2020-12-16 PROCEDURE — 99213 OFFICE O/P EST LOW 20 MIN: CPT | Mod: PBBFAC,PO | Performed by: NURSE PRACTITIONER

## 2020-12-16 PROCEDURE — 80069 RENAL FUNCTION PANEL: CPT

## 2020-12-16 PROCEDURE — 99203 PR OFFICE/OUTPT VISIT, NEW, LEVL III, 30-44 MIN: ICD-10-PCS | Mod: S$PBB,,, | Performed by: NURSE PRACTITIONER

## 2020-12-16 PROCEDURE — 99203 OFFICE O/P NEW LOW 30 MIN: CPT | Mod: S$PBB,,, | Performed by: NURSE PRACTITIONER

## 2020-12-16 PROCEDURE — 99999 PR PBB SHADOW E&M-EST. PATIENT-LVL III: CPT | Mod: PBBFAC,,, | Performed by: NURSE PRACTITIONER

## 2020-12-16 PROCEDURE — 36415 COLL VENOUS BLD VENIPUNCTURE: CPT | Mod: PO

## 2020-12-16 PROCEDURE — 80061 LIPID PANEL: CPT

## 2020-12-16 PROCEDURE — 99999 PR PBB SHADOW E&M-EST. PATIENT-LVL III: ICD-10-PCS | Mod: PBBFAC,,, | Performed by: NURSE PRACTITIONER

## 2020-12-16 RX ORDER — METFORMIN HYDROCHLORIDE 500 MG/1
500 TABLET ORAL 2 TIMES DAILY WITH MEALS
COMMUNITY
End: 2020-12-16 | Stop reason: SDUPTHER

## 2020-12-16 RX ORDER — METFORMIN HYDROCHLORIDE 500 MG/1
500 TABLET ORAL 2 TIMES DAILY WITH MEALS
Qty: 60 TABLET | Refills: 2 | Status: SHIPPED | OUTPATIENT
Start: 2020-12-16 | End: 2021-01-19

## 2020-12-16 NOTE — PROGRESS NOTES
Subjective:       Patient ID: Giles Bravo is a 45 y.o. female.    Chief Complaint: Medication Refill    She comes into the office as a new patient, she is transferring services from Dr. Villanueva and will be establishing with Dr Juarez.  Medical history includes hypothyroidism, type 2 diabetes, hypertriglyceridemia, nephrotic syndrome, proteinuria.  She is , she denies tobacco alcohol or drug use.    Diabetes  She presents for her follow-up diabetic visit. She has type 2 diabetes mellitus. Her disease course has been stable. There are no hypoglycemic associated symptoms. Pertinent negatives for hypoglycemia include no dizziness or nervousness/anxiousness. There are no diabetic associated symptoms. There are no hypoglycemic complications. Symptoms are stable. There are no diabetic complications. Risk factors for coronary artery disease include dyslipidemia. Current diabetic treatment includes oral agent (monotherapy). She is compliant with treatment most of the time. Her weight is stable. She is following a generally healthy diet. An ACE inhibitor/angiotensin II receptor blocker is not being taken. She does not see a podiatrist.  Thyroid Problem  Presents for follow-up visit. Patient reports no anxiety, diarrhea or palpitations. The symptoms have been stable.       Review of Systems   Constitutional: Negative for unexpected weight change.   HENT: Negative for ear pain.    Eyes: Negative for pain and visual disturbance.   Respiratory: Negative for shortness of breath.    Cardiovascular: Negative for palpitations.   Gastrointestinal: Negative for diarrhea.   Skin: Negative for color change.   Neurological: Negative for dizziness.   Psychiatric/Behavioral: Negative for dysphoric mood and sleep disturbance. The patient is not nervous/anxious.        Vitals:    12/16/20 0850   BP: (!) 130/92   Pulse: 88   Temp: 97.9 °F (36.6 °C)       Objective:     Current Outpatient Medications   Medication Sig Dispense Refill     metFORMIN (GLUCOPHAGE) 500 MG tablet Take 1 tablet (500 mg total) by mouth 2 (two) times daily with meals. 60 tablet 2    multivit-mins no.63/iron/folic (M-VIT ORAL) Take by mouth.      NON FORMULARY MEDICATION Clearlife allergy:    Each tablet contains Active Ingredients Purpose  Antimonium crudum 10X Relieves minor skin rashes  *Arnica montana 5X Relieves itching skin  *Euphrasia officinalis 6X Relieves allergic eye symptoms  Formicum acidum 8X Relieves dry throat  Graphites 10X Relieves minor skin irritations  Histaminum hydrochloricum 8X Relieves allergic itch  Histaminum hydrochloricum 12X Relieves allergic itch  Histaminum hydrochloricum 20X Relieves allergic itch  *Ignatia karyn 5X Relieves tickling in throat  *Lappa major 8X Relieves itchy, irritated skin  *Ledum palustre 8X Relieves itchy skin  *Lycopodium clavatum 6X Relieves nasal congestion  *Pix liquida 10X Relieves itchy skin  Selenium metallicum 12X Relieves minor skin rashes  *Sulphur 12X Relieves sneezing  Sulphuricum acidum 21X Relieves itchy, water eyes  Tellurium metallicum 10X Relieves itchy eyes  *Thuja occidentalis 6X Relieves nasal irritations  *Natural Ingredients   X is a homeopathic dilution.      NON FORMULARY MEDICATION Nature Made Diabetes Health Pack  Supplement Facts  Serving Size: 1 Packet  Servings per Container: 30  Amount Per Serving % Daily Value  Calories 15      Calories from Fat 10   Total Fat 1 g 2%  Cholesterol 10 mg 3%  Total Carbohydrate 1 g <1%  Vitamin A (as Acetate and 40% as Beta Carotene) 2500 IU 50%  Vitamin C (as Ascorbic Acid) 590 mg 983%  Vitamin D3 (as Cholecalciferol) 1500 %  Vitamin E (as dl-Alpha Tocopheryl Acetate) 50 %  Vitamin K (as Phytonadione) 30 mcg 38%  Thiamin (as Thiamin Mononitrate) 1.5 mg 100%  Riboflavin 1.7 mg 100%  Niacin (a Niacinamide) 20 mg 100%  Vitamin B6 (as Pyridoxine Hydrochloride) 3 mg 150%  Folic Acid 500 mcg 125%  Vitamin B12 (as Pyridoxine Hydrochloride) 25  mcg 417%  Biotin 30 mcg 10%  Pantothenic Acid (as d-Calcium Pantothenate) 10 mg 100%  Calcium (sa Dibasic Calcium Phosphate and Calcium Carbonate) 220 mg 22%  Phosphorus (as Dibasic Calcium Phosphate) 110 mg 11%  Iodine (as Potassium Iodide) 150 mcg 100%  Magnesium (as Magnesium Oxide) 300 mg 75%  Zinc (a Zinc Oxide) 11 mg 73%  Selenium (as Sodium Selenate) 55 mcg 79%  Copper (as Copper Sulfate) 0.9 mg 45%  Manganese (as Manganese Sulfate) 2.3 mg 115%  Chromium (as Chromium Picolinate and Chromium Chloride) 395 mg 330%  Molybdenum (as Sodium Molybdate) 45 mcg 60%  Chloride (as Potassium Chloride) 72 mg 2%  Potassium (as Potassium Chloride) 80 mg 2%  Boron (as Sodium Borate and Boric Acid) 150 mcg *  Nickel (as Nickel Sulfate) 5 mcg *  Silicon (as Silicon Dioxide) 2 mg *  Vanadium (as Sodium Metavanadate) 10 mcg *  Lutein 250 mcg *  Lycopene 300 mcg *  Alpha-Lipoic Acid 50 mg *  Green Tea Leaf Extract (Mohini sinensis) 50 mg *     Polyphenols 25 mg *  Fish Oil Concentrate 1200 mg *     Total Omega-3 Fatty Acids 360 mg *     Omega-3 EPA and  mg *     Omega-3 Other 60 mg *  *Daily value not established.  Other Ingredients: Cellulose gel, gelatin, gly      atorvastatin (LIPITOR) 20 MG tablet Take 1 tablet (20 mg total) by mouth once daily. (Patient not taking: Reported on 10/21/2020) 30 tablet 6    cycloSPORINE modified, NEORAL, (NEORAL) 100 MG capsule Take 1 capsule (100 mg total) by mouth 2 (two) times daily. (Patient not taking: Reported on 10/21/2020) 60 capsule 0    esomeprazole (NEXIUM) 20 MG capsule Take 1 capsule (20 mg total) by mouth before breakfast. 30 capsule 6    fenofibrate 160 MG Tab Take 1 tablet (160 mg total) by mouth once daily. (Patient not taking: Reported on 10/21/2020) 30 tablet 6    levothyroxine (SYNTHROID) 88 MCG tablet Take 1 tablet (88 mcg total) by mouth before breakfast. 30 tablet 6    predniSONE (DELTASONE) 20 MG tablet Take 1 tablet (20 mg total) by mouth every evening. 90  tablet 0     No current facility-administered medications for this visit.        Physical Exam  Vitals signs and nursing note reviewed.   Constitutional:       General: She is not in acute distress.     Appearance: She is well-developed.   HENT:      Head: Normocephalic and atraumatic.   Eyes:      Pupils: Pupils are equal, round, and reactive to light.   Neck:      Musculoskeletal: Normal range of motion and neck supple.   Cardiovascular:      Rate and Rhythm: Normal rate and regular rhythm.   Pulmonary:      Effort: Pulmonary effort is normal.      Breath sounds: Normal breath sounds.   Musculoskeletal: Normal range of motion.   Skin:     General: Skin is warm and dry.      Findings: No rash.   Neurological:      Mental Status: She is alert and oriented to person, place, and time.   Psychiatric:         Judgment: Judgment normal.           Lab Results   Component Value Date    HGBA1C 5.7 (H) 05/13/2019       Assessment:       1. Type 2 diabetes mellitus without complication, without long-term current use of insulin    2. Hypothyroidism, unspecified type        Plan:   Type 2 diabetes mellitus without complication, without long-term current use of insulin    Hypothyroidism, unspecified type    Other orders  -     metFORMIN (GLUCOPHAGE) 500 MG tablet; Take 1 tablet (500 mg total) by mouth 2 (two) times daily with meals.  Dispense: 60 tablet; Refill: 2        No follow-ups on file.    There are no Patient Instructions on file for this visit.

## 2020-12-23 ENCOUNTER — OFFICE VISIT (OUTPATIENT)
Dept: NEPHROLOGY | Facility: CLINIC | Age: 45
End: 2020-12-23
Payer: OTHER GOVERNMENT

## 2020-12-23 VITALS — SYSTOLIC BLOOD PRESSURE: 126 MMHG | HEART RATE: 96 BPM | OXYGEN SATURATION: 98 % | DIASTOLIC BLOOD PRESSURE: 80 MMHG

## 2020-12-23 DIAGNOSIS — E03.9 HYPOTHYROIDISM, UNSPECIFIED TYPE: ICD-10-CM

## 2020-12-23 DIAGNOSIS — R80.9 NEPHROTIC RANGE PROTEINURIA: ICD-10-CM

## 2020-12-23 DIAGNOSIS — N05.0 MINIMAL CHANGE DISEASE: Primary | ICD-10-CM

## 2020-12-23 PROCEDURE — 99214 OFFICE O/P EST MOD 30 MIN: CPT | Mod: S$PBB,,, | Performed by: INTERNAL MEDICINE

## 2020-12-23 PROCEDURE — 99212 OFFICE O/P EST SF 10 MIN: CPT | Mod: PBBFAC,PO | Performed by: INTERNAL MEDICINE

## 2020-12-23 PROCEDURE — 99999 PR PBB SHADOW E&M-EST. PATIENT-LVL II: CPT | Mod: PBBFAC,,, | Performed by: INTERNAL MEDICINE

## 2020-12-23 PROCEDURE — 99999 PR PBB SHADOW E&M-EST. PATIENT-LVL II: ICD-10-PCS | Mod: PBBFAC,,, | Performed by: INTERNAL MEDICINE

## 2020-12-23 PROCEDURE — 99214 PR OFFICE/OUTPT VISIT, EST, LEVL IV, 30-39 MIN: ICD-10-PCS | Mod: S$PBB,,, | Performed by: INTERNAL MEDICINE

## 2020-12-23 RX ORDER — LEVOTHYROXINE SODIUM 88 UG/1
88 TABLET ORAL
Qty: 30 TABLET | Refills: 6 | Status: SHIPPED | OUTPATIENT
Start: 2020-12-23 | End: 2021-07-12

## 2020-12-23 RX ORDER — HYDROGEN PEROXIDE 3 %
20 SOLUTION, NON-ORAL MISCELLANEOUS
Qty: 30 CAPSULE | Refills: 6 | Status: SHIPPED | OUTPATIENT
Start: 2020-12-23 | End: 2021-04-12

## 2020-12-23 RX ORDER — PREDNISONE 20 MG/1
20 TABLET ORAL NIGHTLY
COMMUNITY
End: 2020-12-23 | Stop reason: SDUPTHER

## 2020-12-23 RX ORDER — PREDNISONE 20 MG/1
20 TABLET ORAL NIGHTLY
Qty: 90 TABLET | Refills: 0 | Status: SHIPPED | OUTPATIENT
Start: 2020-12-23 | End: 2021-03-11 | Stop reason: SDUPTHER

## 2020-12-23 NOTE — PROGRESS NOTES
Subjective:       Patient ID: Giles Bravo is a 45 y.o.  female who presents for return patient evaluation for chronic renal failure.    She has been feeling much better and her edema has completely resolved.  She has no uremic or urinary symptoms and is in her usual state of health.  There have been no recent illnesses, hospitalizations or procedures.        Review of Systems   Constitutional: Negative for fatigue.   HENT: Negative for congestion.    Respiratory: Negative for shortness of breath.    Cardiovascular: Negative for leg swelling.   Gastrointestinal: Negative for diarrhea and nausea.   Genitourinary: Negative for decreased urine volume, difficulty urinating and dysuria.   Musculoskeletal: Negative for arthralgias.   Neurological: Negative for headaches.   Psychiatric/Behavioral: Negative for confusion.       The past medical, family and social histories were reviewed for this encounter.     /80   Pulse 96   LMP  (LMP Unknown)   SpO2 98%     Objective:      Physical Exam  Vitals signs reviewed.   Constitutional:       General: She is not in acute distress.  HENT:      Head: Normocephalic and atraumatic.   Eyes:      General: No scleral icterus.  Neck:      Vascular: No JVD.   Cardiovascular:      Rate and Rhythm: Normal rate and regular rhythm.      Heart sounds: No murmur.   Pulmonary:      Effort: Pulmonary effort is normal.      Breath sounds: Normal breath sounds.   Abdominal:      General: There is no distension.      Palpations: Abdomen is soft.   Skin:     General: Skin is warm and dry.   Neurological:      Mental Status: She is alert and oriented to person, place, and time.         Assessment:       1. Minimal change disease    2. Nephrotic range proteinuria        Plan:   Return to clinic in 3 months.  Labs for next visit include rp, lipids, upc in [place].    Baseline creatinine is 0.8.  UPC is negative.  Albumin is 4.1.  She has responded to steroids again.  I am treating  her as a frequently relapsing steroid dependent JOSSE patient.  I would like to achieve remission again with steroids then try rituxan since the patient does not like to take steroids and seems to relapse after stopping them each time.  Plan to continue prednisone (9/17/20 start) 20 mg HS.  I would like to see if her insurance will approve using rituxan for a frequently relapsing steroid dependent JOSSE patient.

## 2021-01-12 ENCOUNTER — OFFICE VISIT (OUTPATIENT)
Dept: FAMILY MEDICINE | Facility: CLINIC | Age: 46
End: 2021-01-12
Payer: OTHER GOVERNMENT

## 2021-01-12 ENCOUNTER — HOSPITAL ENCOUNTER (OUTPATIENT)
Dept: RADIOLOGY | Facility: HOSPITAL | Age: 46
Discharge: HOME | End: 2021-01-12
Attending: INTERNAL MEDICINE
Payer: OTHER GOVERNMENT

## 2021-01-12 VITALS
DIASTOLIC BLOOD PRESSURE: 76 MMHG | BODY MASS INDEX: 22.71 KG/M2 | SYSTOLIC BLOOD PRESSURE: 116 MMHG | HEART RATE: 79 BPM | TEMPERATURE: 98 F | WEIGHT: 133 LBS | HEIGHT: 64 IN

## 2021-01-12 VITALS — HEIGHT: 64 IN | BODY MASS INDEX: 22.7 KG/M2 | WEIGHT: 132.94 LBS

## 2021-01-12 DIAGNOSIS — Z12.31 ENCOUNTER FOR SCREENING MAMMOGRAM FOR BREAST CANCER: ICD-10-CM

## 2021-01-12 DIAGNOSIS — E78.2 MIXED HYPERLIPIDEMIA: Primary | ICD-10-CM

## 2021-01-12 DIAGNOSIS — E11.9 TYPE 2 DIABETES MELLITUS WITHOUT COMPLICATION, WITHOUT LONG-TERM CURRENT USE OF INSULIN: ICD-10-CM

## 2021-01-12 DIAGNOSIS — N05.0 MINIMAL CHANGE DISEASE: ICD-10-CM

## 2021-01-12 DIAGNOSIS — G89.29 CHRONIC ABDOMINAL PAIN: ICD-10-CM

## 2021-01-12 DIAGNOSIS — Z00.00 ENCOUNTER FOR ANNUAL HEALTH EXAMINATION: ICD-10-CM

## 2021-01-12 DIAGNOSIS — Z79.899 ENCOUNTER FOR LONG-TERM (CURRENT) USE OF HIGH-RISK MEDICATION: ICD-10-CM

## 2021-01-12 DIAGNOSIS — R10.9 CHRONIC ABDOMINAL PAIN: ICD-10-CM

## 2021-01-12 DIAGNOSIS — E03.9 HYPOTHYROIDISM, UNSPECIFIED TYPE: ICD-10-CM

## 2021-01-12 PROCEDURE — 77063 MAMMO DIGITAL SCREENING BILAT WITH TOMO: ICD-10-PCS | Mod: 26,,, | Performed by: RADIOLOGY

## 2021-01-12 PROCEDURE — 77067 SCR MAMMO BI INCL CAD: CPT | Mod: 26,,, | Performed by: RADIOLOGY

## 2021-01-12 PROCEDURE — 99999 PR PBB SHADOW E&M-EST. PATIENT-LVL III: ICD-10-PCS | Mod: PBBFAC,,, | Performed by: INTERNAL MEDICINE

## 2021-01-12 PROCEDURE — 99213 OFFICE O/P EST LOW 20 MIN: CPT | Mod: PBBFAC,PO | Performed by: INTERNAL MEDICINE

## 2021-01-12 PROCEDURE — 99214 OFFICE O/P EST MOD 30 MIN: CPT | Mod: S$PBB,,, | Performed by: INTERNAL MEDICINE

## 2021-01-12 PROCEDURE — 77063 BREAST TOMOSYNTHESIS BI: CPT | Mod: 26,,, | Performed by: RADIOLOGY

## 2021-01-12 PROCEDURE — 99214 PR OFFICE/OUTPT VISIT, EST, LEVL IV, 30-39 MIN: ICD-10-PCS | Mod: S$PBB,,, | Performed by: INTERNAL MEDICINE

## 2021-01-12 PROCEDURE — 77067 MAMMO DIGITAL SCREENING BILAT WITH TOMO: ICD-10-PCS | Mod: 26,,, | Performed by: RADIOLOGY

## 2021-01-12 PROCEDURE — 99999 PR PBB SHADOW E&M-EST. PATIENT-LVL III: CPT | Mod: PBBFAC,,, | Performed by: INTERNAL MEDICINE

## 2021-01-12 PROCEDURE — 77067 SCR MAMMO BI INCL CAD: CPT | Mod: TC,PO

## 2021-01-12 RX ORDER — ATORVASTATIN CALCIUM 40 MG/1
40 TABLET, FILM COATED ORAL DAILY
Qty: 90 TABLET | Refills: 3 | Status: SHIPPED | OUTPATIENT
Start: 2021-01-12 | End: 2021-01-14

## 2021-01-14 DIAGNOSIS — E78.2 MIXED HYPERLIPIDEMIA: ICD-10-CM

## 2021-01-15 ENCOUNTER — OFFICE VISIT (OUTPATIENT)
Dept: FAMILY MEDICINE | Facility: CLINIC | Age: 46
End: 2021-01-15
Payer: OTHER GOVERNMENT

## 2021-01-15 VITALS
BODY MASS INDEX: 22.91 KG/M2 | WEIGHT: 134.19 LBS | HEIGHT: 64 IN | DIASTOLIC BLOOD PRESSURE: 86 MMHG | HEART RATE: 87 BPM | TEMPERATURE: 98 F | SYSTOLIC BLOOD PRESSURE: 124 MMHG

## 2021-01-15 DIAGNOSIS — Z12.4 ENCOUNTER FOR PAPANICOLAOU SMEAR FOR CERVICAL CANCER SCREENING: Primary | ICD-10-CM

## 2021-01-15 PROCEDURE — 99396 PREV VISIT EST AGE 40-64: CPT | Mod: S$PBB,,, | Performed by: NURSE PRACTITIONER

## 2021-01-15 PROCEDURE — 99213 OFFICE O/P EST LOW 20 MIN: CPT | Mod: PBBFAC,PO,25 | Performed by: NURSE PRACTITIONER

## 2021-01-15 PROCEDURE — 99999 PR PBB SHADOW E&M-EST. PATIENT-LVL III: CPT | Mod: PBBFAC,,, | Performed by: NURSE PRACTITIONER

## 2021-01-15 PROCEDURE — 88175 CYTOPATH C/V AUTO FLUID REDO: CPT

## 2021-01-15 PROCEDURE — 99999 PR PBB SHADOW E&M-EST. PATIENT-LVL III: ICD-10-PCS | Mod: PBBFAC,,, | Performed by: NURSE PRACTITIONER

## 2021-01-15 PROCEDURE — 99396 PR PREVENTIVE VISIT,EST,40-64: ICD-10-PCS | Mod: S$PBB,,, | Performed by: NURSE PRACTITIONER

## 2021-01-15 PROCEDURE — 87624 HPV HI-RISK TYP POOLED RSLT: CPT

## 2021-01-19 DIAGNOSIS — E11.9 TYPE 2 DIABETES MELLITUS WITHOUT COMPLICATION, WITHOUT LONG-TERM CURRENT USE OF INSULIN: Primary | ICD-10-CM

## 2021-01-19 RX ORDER — METFORMIN HYDROCHLORIDE 1000 MG/1
1000 TABLET ORAL 2 TIMES DAILY WITH MEALS
Qty: 180 TABLET | Refills: 3 | Status: SHIPPED | OUTPATIENT
Start: 2021-01-19 | End: 2021-07-12 | Stop reason: SDUPTHER

## 2021-01-21 ENCOUNTER — PATIENT OUTREACH (OUTPATIENT)
Dept: ADMINISTRATIVE | Facility: HOSPITAL | Age: 46
End: 2021-01-21

## 2021-01-26 LAB
HPV HR 12 DNA SPEC QL NAA+PROBE: NEGATIVE
HPV16 AG SPEC QL: NEGATIVE
HPV18 DNA SPEC QL NAA+PROBE: NEGATIVE

## 2021-02-01 ENCOUNTER — TELEPHONE (OUTPATIENT)
Dept: RADIOLOGY | Facility: HOSPITAL | Age: 46
End: 2021-02-01

## 2021-02-01 ENCOUNTER — LAB VISIT (OUTPATIENT)
Dept: INFUSION THERAPY | Facility: HOSPITAL | Age: 46
End: 2021-02-01
Attending: INTERNAL MEDICINE
Payer: OTHER GOVERNMENT

## 2021-02-01 ENCOUNTER — DOCUMENTATION ONLY (OUTPATIENT)
Dept: INFUSION THERAPY | Facility: HOSPITAL | Age: 46
End: 2021-02-01

## 2021-02-01 VITALS
BODY MASS INDEX: 22.99 KG/M2 | WEIGHT: 134.69 LBS | HEIGHT: 64 IN | HEART RATE: 100 BPM | TEMPERATURE: 98 F | DIASTOLIC BLOOD PRESSURE: 77 MMHG | SYSTOLIC BLOOD PRESSURE: 152 MMHG | RESPIRATION RATE: 16 BRPM

## 2021-02-01 DIAGNOSIS — R80.9 NEPHROTIC RANGE PROTEINURIA: Primary | ICD-10-CM

## 2021-02-01 DIAGNOSIS — Z03.818 ENCNTR FOR OBS FOR SUSP EXPSR TO OTH BIOLG AGENTS RULED OUT: Primary | ICD-10-CM

## 2021-02-01 LAB
HBV SURFACE AG SERPL QL IA: NEGATIVE
SARS-COV-2 RDRP RESP QL NAA+PROBE: NEGATIVE

## 2021-02-01 PROCEDURE — U0002 COVID-19 LAB TEST NON-CDC: HCPCS | Mod: PN

## 2021-02-01 PROCEDURE — 86706 HEP B SURFACE ANTIBODY: CPT

## 2021-02-01 PROCEDURE — U0002 COVID-19 LAB TEST NON-CDC: HCPCS

## 2021-02-01 PROCEDURE — 25000003 PHARM REV CODE 250: Mod: PN | Performed by: INTERNAL MEDICINE

## 2021-02-01 PROCEDURE — 96413 CHEMO IV INFUSION 1 HR: CPT | Mod: PN

## 2021-02-01 PROCEDURE — 87340 HEPATITIS B SURFACE AG IA: CPT

## 2021-02-01 PROCEDURE — 63600175 PHARM REV CODE 636 W HCPCS: Mod: JG,PN | Performed by: INTERNAL MEDICINE

## 2021-02-01 PROCEDURE — 96375 TX/PRO/DX INJ NEW DRUG ADDON: CPT | Mod: PN

## 2021-02-01 PROCEDURE — 86704 HEP B CORE ANTIBODY TOTAL: CPT

## 2021-02-01 PROCEDURE — 96415 CHEMO IV INFUSION ADDL HR: CPT | Mod: PN

## 2021-02-01 PROCEDURE — 87340 HEPATITIS B SURFACE AG IA: CPT | Mod: PN

## 2021-02-01 RX ORDER — HEPARIN 100 UNIT/ML
500 SYRINGE INTRAVENOUS
Status: DISCONTINUED | OUTPATIENT
Start: 2021-02-01 | End: 2021-02-01 | Stop reason: HOSPADM

## 2021-02-01 RX ORDER — ACETAMINOPHEN 325 MG/1
650 TABLET ORAL
Status: COMPLETED | OUTPATIENT
Start: 2021-02-01 | End: 2021-02-01

## 2021-02-01 RX ORDER — FAMOTIDINE 10 MG/ML
20 INJECTION INTRAVENOUS ONCE AS NEEDED
Status: CANCELLED
Start: 2021-02-15

## 2021-02-01 RX ORDER — ACETAMINOPHEN 325 MG/1
650 TABLET ORAL
Status: CANCELLED | OUTPATIENT
Start: 2021-02-15

## 2021-02-01 RX ORDER — SODIUM CHLORIDE 0.9 % (FLUSH) 0.9 %
10 SYRINGE (ML) INJECTION
Status: CANCELLED | OUTPATIENT
Start: 2021-02-15

## 2021-02-01 RX ORDER — MEPERIDINE HYDROCHLORIDE 50 MG/ML
25 INJECTION INTRAMUSCULAR; INTRAVENOUS; SUBCUTANEOUS
Status: CANCELLED | OUTPATIENT
Start: 2021-02-15

## 2021-02-01 RX ORDER — METHYLPREDNISOLONE SOD SUCC 125 MG
125 VIAL (EA) INJECTION ONCE AS NEEDED
Status: CANCELLED
Start: 2021-02-15

## 2021-02-01 RX ORDER — MEPERIDINE HYDROCHLORIDE 25 MG/ML
25 INJECTION INTRAMUSCULAR; INTRAVENOUS; SUBCUTANEOUS
Status: DISCONTINUED | OUTPATIENT
Start: 2021-02-01 | End: 2021-02-01 | Stop reason: HOSPADM

## 2021-02-01 RX ORDER — SODIUM CHLORIDE 0.9 % (FLUSH) 0.9 %
10 SYRINGE (ML) INJECTION
Status: DISCONTINUED | OUTPATIENT
Start: 2021-02-01 | End: 2021-02-01 | Stop reason: HOSPADM

## 2021-02-01 RX ORDER — DIPHENHYDRAMINE HCL 25 MG
25 CAPSULE ORAL
Status: CANCELLED | OUTPATIENT
Start: 2021-02-15

## 2021-02-01 RX ORDER — FAMOTIDINE 10 MG/ML
20 INJECTION INTRAVENOUS ONCE AS NEEDED
Status: COMPLETED | OUTPATIENT
Start: 2021-02-01 | End: 2021-02-01

## 2021-02-01 RX ORDER — HEPARIN 100 UNIT/ML
500 SYRINGE INTRAVENOUS
Status: CANCELLED | OUTPATIENT
Start: 2021-02-15

## 2021-02-01 RX ORDER — DIPHENHYDRAMINE HYDROCHLORIDE 50 MG/ML
50 INJECTION INTRAMUSCULAR; INTRAVENOUS ONCE AS NEEDED
Status: CANCELLED
Start: 2021-02-15

## 2021-02-01 RX ORDER — DIPHENHYDRAMINE HYDROCHLORIDE 50 MG/ML
50 INJECTION INTRAMUSCULAR; INTRAVENOUS ONCE AS NEEDED
Status: COMPLETED | OUTPATIENT
Start: 2021-02-01 | End: 2021-02-01

## 2021-02-01 RX ORDER — DIPHENHYDRAMINE HCL 25 MG
25 CAPSULE ORAL
Status: COMPLETED | OUTPATIENT
Start: 2021-02-01 | End: 2021-02-01

## 2021-02-01 RX ORDER — METHYLPREDNISOLONE SOD SUCC 125 MG
125 VIAL (EA) INJECTION ONCE AS NEEDED
Status: COMPLETED | OUTPATIENT
Start: 2021-02-01 | End: 2021-02-01

## 2021-02-01 RX ADMIN — FAMOTIDINE 20 MG: 10 INJECTION INTRAVENOUS at 12:02

## 2021-02-01 RX ADMIN — METHYLPREDNISOLONE SODIUM SUCCINATE 125 MG: 125 INJECTION, POWDER, FOR SOLUTION INTRAMUSCULAR; INTRAVENOUS at 12:02

## 2021-02-01 RX ADMIN — RITUXIMAB 1000 MG: 10 INJECTION, SOLUTION INTRAVENOUS at 11:02

## 2021-02-01 RX ADMIN — ACETAMINOPHEN 650 MG: 325 TABLET, FILM COATED ORAL at 10:02

## 2021-02-01 RX ADMIN — DIPHENHYDRAMINE HYDROCHLORIDE 25 MG: 25 CAPSULE ORAL at 10:02

## 2021-02-01 RX ADMIN — SODIUM CHLORIDE: 9 INJECTION, SOLUTION INTRAVENOUS at 10:02

## 2021-02-01 RX ADMIN — DIPHENHYDRAMINE HYDROCHLORIDE 50 MG: 50 INJECTION INTRAMUSCULAR; INTRAVENOUS at 12:02

## 2021-02-02 LAB
HBV CORE AB SERPL QL IA: POSITIVE
HBV SURFACE AB SER-ACNC: POSITIVE M[IU]/ML

## 2021-02-04 ENCOUNTER — HOSPITAL ENCOUNTER (OUTPATIENT)
Dept: RADIOLOGY | Facility: HOSPITAL | Age: 46
Discharge: HOME | End: 2021-02-04
Attending: NURSE PRACTITIONER
Payer: OTHER GOVERNMENT

## 2021-02-04 DIAGNOSIS — G89.29 CHRONIC ABDOMINAL PAIN: ICD-10-CM

## 2021-02-04 DIAGNOSIS — R10.9 CHRONIC ABDOMINAL PAIN: ICD-10-CM

## 2021-02-04 PROCEDURE — 76700 US EXAM ABDOM COMPLETE: CPT | Mod: TC,PO

## 2021-02-04 PROCEDURE — 76700 US EXAM ABDOM COMPLETE: CPT | Mod: 26,,, | Performed by: RADIOLOGY

## 2021-02-04 PROCEDURE — 76700 US ABDOMEN COMPLETE: ICD-10-PCS | Mod: 26,,, | Performed by: RADIOLOGY

## 2021-02-05 ENCOUNTER — PATIENT MESSAGE (OUTPATIENT)
Dept: ADMINISTRATIVE | Facility: HOSPITAL | Age: 46
End: 2021-02-05

## 2021-02-05 ENCOUNTER — PATIENT OUTREACH (OUTPATIENT)
Dept: ADMINISTRATIVE | Facility: HOSPITAL | Age: 46
End: 2021-02-05

## 2021-02-11 LAB
FINAL PATHOLOGIC DIAGNOSIS: NORMAL
Lab: NORMAL

## 2021-02-18 ENCOUNTER — INFUSION (OUTPATIENT)
Dept: INFUSION THERAPY | Facility: HOSPITAL | Age: 46
End: 2021-02-18
Attending: INTERNAL MEDICINE
Payer: OTHER GOVERNMENT

## 2021-02-18 VITALS
RESPIRATION RATE: 16 BRPM | WEIGHT: 134.69 LBS | HEIGHT: 64 IN | SYSTOLIC BLOOD PRESSURE: 132 MMHG | DIASTOLIC BLOOD PRESSURE: 80 MMHG | BODY MASS INDEX: 22.99 KG/M2 | HEART RATE: 66 BPM | TEMPERATURE: 98 F

## 2021-02-18 DIAGNOSIS — R80.9 NEPHROTIC RANGE PROTEINURIA: Primary | ICD-10-CM

## 2021-02-18 PROCEDURE — 96413 CHEMO IV INFUSION 1 HR: CPT | Mod: PN

## 2021-02-18 PROCEDURE — 25000003 PHARM REV CODE 250: Mod: PN | Performed by: INTERNAL MEDICINE

## 2021-02-18 PROCEDURE — A4216 STERILE WATER/SALINE, 10 ML: HCPCS | Mod: PN | Performed by: INTERNAL MEDICINE

## 2021-02-18 PROCEDURE — 96415 CHEMO IV INFUSION ADDL HR: CPT | Mod: PN

## 2021-02-18 PROCEDURE — 63600175 PHARM REV CODE 636 W HCPCS: Mod: JG,PN | Performed by: INTERNAL MEDICINE

## 2021-02-18 RX ORDER — METHYLPREDNISOLONE SOD SUCC 125 MG
125 VIAL (EA) INJECTION ONCE AS NEEDED
Status: CANCELLED
Start: 2021-03-01

## 2021-02-18 RX ORDER — SODIUM CHLORIDE 0.9 % (FLUSH) 0.9 %
10 SYRINGE (ML) INJECTION
Status: DISCONTINUED | OUTPATIENT
Start: 2021-02-18 | End: 2021-02-18 | Stop reason: HOSPADM

## 2021-02-18 RX ORDER — FAMOTIDINE 10 MG/ML
20 INJECTION INTRAVENOUS ONCE AS NEEDED
Status: CANCELLED
Start: 2021-03-01

## 2021-02-18 RX ORDER — HEPARIN 100 UNIT/ML
500 SYRINGE INTRAVENOUS
Status: DISCONTINUED | OUTPATIENT
Start: 2021-02-18 | End: 2021-02-18 | Stop reason: HOSPADM

## 2021-02-18 RX ORDER — SODIUM CHLORIDE 0.9 % (FLUSH) 0.9 %
10 SYRINGE (ML) INJECTION
Status: CANCELLED | OUTPATIENT
Start: 2021-03-01

## 2021-02-18 RX ORDER — ACETAMINOPHEN 325 MG/1
650 TABLET ORAL
Status: COMPLETED | OUTPATIENT
Start: 2021-02-18 | End: 2021-02-18

## 2021-02-18 RX ORDER — MEPERIDINE HYDROCHLORIDE 50 MG/ML
25 INJECTION INTRAMUSCULAR; INTRAVENOUS; SUBCUTANEOUS
Status: CANCELLED | OUTPATIENT
Start: 2021-03-01

## 2021-02-18 RX ORDER — MEPERIDINE HYDROCHLORIDE 25 MG/ML
25 INJECTION INTRAMUSCULAR; INTRAVENOUS; SUBCUTANEOUS
Status: DISCONTINUED | OUTPATIENT
Start: 2021-02-18 | End: 2021-02-18 | Stop reason: HOSPADM

## 2021-02-18 RX ORDER — DIPHENHYDRAMINE HYDROCHLORIDE 50 MG/ML
50 INJECTION INTRAMUSCULAR; INTRAVENOUS ONCE AS NEEDED
Status: CANCELLED
Start: 2021-03-01

## 2021-02-18 RX ORDER — DIPHENHYDRAMINE HCL 25 MG
25 CAPSULE ORAL
Status: COMPLETED | OUTPATIENT
Start: 2021-02-18 | End: 2021-02-18

## 2021-02-18 RX ORDER — HEPARIN 100 UNIT/ML
500 SYRINGE INTRAVENOUS
Status: CANCELLED | OUTPATIENT
Start: 2021-03-01

## 2021-02-18 RX ORDER — DIPHENHYDRAMINE HCL 25 MG
25 CAPSULE ORAL
Status: CANCELLED | OUTPATIENT
Start: 2021-03-01

## 2021-02-18 RX ORDER — ACETAMINOPHEN 325 MG/1
650 TABLET ORAL
Status: CANCELLED | OUTPATIENT
Start: 2021-03-01

## 2021-02-18 RX ADMIN — RITUXIMAB 1000 MG: 10 INJECTION, SOLUTION INTRAVENOUS at 10:02

## 2021-02-18 RX ADMIN — Medication 10 ML: at 10:02

## 2021-02-18 RX ADMIN — SODIUM CHLORIDE: 9 INJECTION, SOLUTION INTRAVENOUS at 10:02

## 2021-02-18 RX ADMIN — ACETAMINOPHEN 650 MG: 325 TABLET, FILM COATED ORAL at 10:02

## 2021-02-18 RX ADMIN — DIPHENHYDRAMINE HYDROCHLORIDE 25 MG: 25 CAPSULE ORAL at 10:02

## 2021-03-08 ENCOUNTER — PATIENT OUTREACH (OUTPATIENT)
Dept: ADMINISTRATIVE | Facility: HOSPITAL | Age: 46
End: 2021-03-08

## 2021-03-11 RX ORDER — PREDNISONE 20 MG/1
20 TABLET ORAL NIGHTLY
Qty: 90 TABLET | Refills: 0 | Status: SHIPPED | OUTPATIENT
Start: 2021-03-11 | End: 2021-04-02

## 2021-03-29 ENCOUNTER — LAB VISIT (OUTPATIENT)
Dept: LAB | Facility: HOSPITAL | Age: 46
End: 2021-03-29
Attending: INTERNAL MEDICINE
Payer: OTHER GOVERNMENT

## 2021-03-29 DIAGNOSIS — E11.9 TYPE 2 DIABETES MELLITUS WITHOUT COMPLICATION, WITHOUT LONG-TERM CURRENT USE OF INSULIN: ICD-10-CM

## 2021-03-29 DIAGNOSIS — N05.0 MINIMAL CHANGE DISEASE: ICD-10-CM

## 2021-03-29 LAB
ALBUMIN SERPL BCP-MCNC: 4 G/DL (ref 3.5–5.2)
ANION GAP SERPL CALC-SCNC: 10 MMOL/L (ref 8–16)
BUN SERPL-MCNC: 13 MG/DL (ref 6–20)
CALCIUM SERPL-MCNC: 8.7 MG/DL (ref 8.7–10.5)
CHLORIDE SERPL-SCNC: 106 MMOL/L (ref 95–110)
CHOLEST SERPL-MCNC: 239 MG/DL (ref 120–199)
CHOLEST/HDLC SERPL: 4.8 {RATIO} (ref 2–5)
CO2 SERPL-SCNC: 25 MMOL/L (ref 23–29)
CREAT SERPL-MCNC: 0.7 MG/DL (ref 0.5–1.4)
EST. GFR  (AFRICAN AMERICAN): >60 ML/MIN/1.73 M^2
EST. GFR  (NON AFRICAN AMERICAN): >60 ML/MIN/1.73 M^2
GLUCOSE SERPL-MCNC: 98 MG/DL (ref 70–110)
HDLC SERPL-MCNC: 50 MG/DL (ref 40–75)
HDLC SERPL: 20.9 % (ref 20–50)
LDLC SERPL CALC-MCNC: 113 MG/DL (ref 63–159)
NONHDLC SERPL-MCNC: 189 MG/DL
PHOSPHATE SERPL-MCNC: 3.6 MG/DL (ref 2.7–4.5)
POTASSIUM SERPL-SCNC: 3.8 MMOL/L (ref 3.5–5.1)
SODIUM SERPL-SCNC: 141 MMOL/L (ref 136–145)
TRIGL SERPL-MCNC: 380 MG/DL (ref 30–150)

## 2021-03-29 PROCEDURE — 83036 HEMOGLOBIN GLYCOSYLATED A1C: CPT | Performed by: INTERNAL MEDICINE

## 2021-03-29 PROCEDURE — 36415 COLL VENOUS BLD VENIPUNCTURE: CPT | Mod: PO | Performed by: INTERNAL MEDICINE

## 2021-03-29 PROCEDURE — 80061 LIPID PANEL: CPT | Performed by: INTERNAL MEDICINE

## 2021-03-29 PROCEDURE — 80069 RENAL FUNCTION PANEL: CPT | Performed by: INTERNAL MEDICINE

## 2021-03-30 ENCOUNTER — TELEPHONE (OUTPATIENT)
Dept: NEPHROLOGY | Facility: CLINIC | Age: 46
End: 2021-03-30

## 2021-03-30 ENCOUNTER — PATIENT OUTREACH (OUTPATIENT)
Dept: ADMINISTRATIVE | Facility: OTHER | Age: 46
End: 2021-03-30

## 2021-03-30 DIAGNOSIS — Z13.5 ENCOUNTER FOR SCREENING FOR DIABETIC RETINOPATHY: Primary | ICD-10-CM

## 2021-03-30 LAB
ESTIMATED AVG GLUCOSE: 160 MG/DL (ref 68–131)
HBA1C MFR BLD: 7.2 % (ref 4–5.6)

## 2021-03-31 ENCOUNTER — TELEPHONE (OUTPATIENT)
Dept: NEPHROLOGY | Facility: CLINIC | Age: 46
End: 2021-03-31

## 2021-03-31 ENCOUNTER — OFFICE VISIT (OUTPATIENT)
Dept: NEPHROLOGY | Facility: CLINIC | Age: 46
End: 2021-03-31
Payer: OTHER GOVERNMENT

## 2021-03-31 VITALS
OXYGEN SATURATION: 98 % | DIASTOLIC BLOOD PRESSURE: 80 MMHG | SYSTOLIC BLOOD PRESSURE: 124 MMHG | BODY MASS INDEX: 23.07 KG/M2 | HEART RATE: 91 BPM | HEIGHT: 64 IN | WEIGHT: 135.13 LBS

## 2021-03-31 DIAGNOSIS — R80.9 NEPHROTIC RANGE PROTEINURIA: Primary | ICD-10-CM

## 2021-03-31 DIAGNOSIS — N05.0 MINIMAL CHANGE DISEASE: ICD-10-CM

## 2021-03-31 PROCEDURE — 99214 OFFICE O/P EST MOD 30 MIN: CPT | Mod: S$PBB,,, | Performed by: INTERNAL MEDICINE

## 2021-03-31 PROCEDURE — 99214 PR OFFICE/OUTPT VISIT, EST, LEVL IV, 30-39 MIN: ICD-10-PCS | Mod: S$PBB,,, | Performed by: INTERNAL MEDICINE

## 2021-03-31 PROCEDURE — 99999 PR PBB SHADOW E&M-EST. PATIENT-LVL III: CPT | Mod: PBBFAC,,, | Performed by: INTERNAL MEDICINE

## 2021-03-31 PROCEDURE — 99213 OFFICE O/P EST LOW 20 MIN: CPT | Mod: PBBFAC,PO | Performed by: INTERNAL MEDICINE

## 2021-03-31 PROCEDURE — 99999 PR PBB SHADOW E&M-EST. PATIENT-LVL III: ICD-10-PCS | Mod: PBBFAC,,, | Performed by: INTERNAL MEDICINE

## 2021-03-31 RX ORDER — MAGNESIUM 30 MG
TABLET ORAL ONCE
COMMUNITY
End: 2022-03-09 | Stop reason: SDUPTHER

## 2021-04-02 RX ORDER — PREDNISONE 5 MG/1
5 TABLET ORAL NIGHTLY
Qty: 30 TABLET | Refills: 3 | Status: SHIPPED | OUTPATIENT
Start: 2021-04-02 | End: 2021-06-10 | Stop reason: SDUPTHER

## 2021-04-12 ENCOUNTER — OFFICE VISIT (OUTPATIENT)
Dept: FAMILY MEDICINE | Facility: CLINIC | Age: 46
End: 2021-04-12
Payer: OTHER GOVERNMENT

## 2021-04-12 VITALS
DIASTOLIC BLOOD PRESSURE: 77 MMHG | HEART RATE: 78 BPM | WEIGHT: 135 LBS | HEIGHT: 64 IN | SYSTOLIC BLOOD PRESSURE: 118 MMHG | BODY MASS INDEX: 23.05 KG/M2 | TEMPERATURE: 98 F

## 2021-04-12 DIAGNOSIS — E78.2 MIXED HYPERLIPIDEMIA: ICD-10-CM

## 2021-04-12 DIAGNOSIS — E11.9 TYPE 2 DIABETES MELLITUS WITHOUT COMPLICATION, WITHOUT LONG-TERM CURRENT USE OF INSULIN: ICD-10-CM

## 2021-04-12 DIAGNOSIS — E03.9 HYPOTHYROIDISM, UNSPECIFIED TYPE: Primary | ICD-10-CM

## 2021-04-12 DIAGNOSIS — N05.0 MINIMAL CHANGE DISEASE: ICD-10-CM

## 2021-04-12 DIAGNOSIS — E80.6 HYPERBILIRUBINEMIA: ICD-10-CM

## 2021-04-12 PROBLEM — K52.9 ACUTE GASTROENTERITIS: Status: RESOLVED | Noted: 2019-05-10 | Resolved: 2021-04-12

## 2021-04-12 PROBLEM — R10.9 ABDOMINAL PAIN: Status: RESOLVED | Noted: 2019-10-23 | Resolved: 2021-04-12

## 2021-04-12 PROBLEM — R94.6 ABNORMAL THYROID FUNCTION TEST: Status: RESOLVED | Noted: 2019-05-11 | Resolved: 2021-04-12

## 2021-04-12 PROBLEM — R33.9 URINARY RETENTION: Status: RESOLVED | Noted: 2019-05-14 | Resolved: 2021-04-12

## 2021-04-12 PROBLEM — J90 PLEURAL EFFUSION ON RIGHT: Status: RESOLVED | Noted: 2019-10-21 | Resolved: 2021-04-12

## 2021-04-12 PROBLEM — R19.7 DIARRHEA: Status: RESOLVED | Noted: 2019-10-23 | Resolved: 2021-04-12

## 2021-04-12 PROBLEM — A04.8 HELICOBACTER PYLORI (H. PYLORI) INFECTION: Status: RESOLVED | Noted: 2019-05-17 | Resolved: 2021-04-12

## 2021-04-12 PROBLEM — R60.9 EDEMA: Status: RESOLVED | Noted: 2019-06-16 | Resolved: 2021-04-12

## 2021-04-12 PROBLEM — E87.6 HYPOKALEMIA: Status: RESOLVED | Noted: 2019-10-23 | Resolved: 2021-04-12

## 2021-04-12 PROBLEM — Z75.8 DISCHARGE PLANNING ISSUES: Status: RESOLVED | Noted: 2019-05-11 | Resolved: 2021-04-12

## 2021-04-12 PROBLEM — K29.70 GASTRITIS WITHOUT BLEEDING: Status: RESOLVED | Noted: 2019-10-25 | Resolved: 2021-04-12

## 2021-04-12 PROBLEM — E87.1 HYPONATREMIA: Status: RESOLVED | Noted: 2019-05-11 | Resolved: 2021-04-12

## 2021-04-12 PROCEDURE — 99214 PR OFFICE/OUTPT VISIT, EST, LEVL IV, 30-39 MIN: ICD-10-PCS | Mod: S$PBB,,, | Performed by: INTERNAL MEDICINE

## 2021-04-12 PROCEDURE — 99213 OFFICE O/P EST LOW 20 MIN: CPT | Mod: PBBFAC,PO | Performed by: INTERNAL MEDICINE

## 2021-04-12 PROCEDURE — 99999 PR PBB SHADOW E&M-EST. PATIENT-LVL III: CPT | Mod: PBBFAC,,, | Performed by: INTERNAL MEDICINE

## 2021-04-12 PROCEDURE — 99214 OFFICE O/P EST MOD 30 MIN: CPT | Mod: S$PBB,,, | Performed by: INTERNAL MEDICINE

## 2021-04-12 PROCEDURE — 99999 PR PBB SHADOW E&M-EST. PATIENT-LVL III: ICD-10-PCS | Mod: PBBFAC,,, | Performed by: INTERNAL MEDICINE

## 2021-04-12 RX ORDER — ROSUVASTATIN CALCIUM 10 MG/1
10 TABLET, COATED ORAL DAILY
Qty: 90 TABLET | Refills: 3 | Status: SHIPPED | OUTPATIENT
Start: 2021-04-12 | End: 2021-07-12 | Stop reason: ALTCHOICE

## 2021-04-13 ENCOUNTER — LAB VISIT (OUTPATIENT)
Dept: LAB | Facility: HOSPITAL | Age: 46
End: 2021-04-13
Attending: INTERNAL MEDICINE
Payer: OTHER GOVERNMENT

## 2021-04-13 DIAGNOSIS — E03.9 HYPOTHYROIDISM, UNSPECIFIED TYPE: ICD-10-CM

## 2021-04-13 DIAGNOSIS — E80.6 HYPERBILIRUBINEMIA: ICD-10-CM

## 2021-04-13 LAB
ALBUMIN SERPL BCP-MCNC: 3.9 G/DL (ref 3.5–5.2)
ALP SERPL-CCNC: 44 U/L (ref 55–135)
ALT SERPL W/O P-5'-P-CCNC: 18 U/L (ref 10–44)
AST SERPL-CCNC: 16 U/L (ref 10–40)
BILIRUB DIRECT SERPL-MCNC: 0.3 MG/DL (ref 0.1–0.3)
BILIRUB SERPL-MCNC: 1.1 MG/DL (ref 0.1–1)
PROT SERPL-MCNC: 7 G/DL (ref 6–8.4)
T4 FREE SERPL-MCNC: 1.12 NG/DL (ref 0.71–1.51)
TSH SERPL DL<=0.005 MIU/L-ACNC: 1.3 UIU/ML (ref 0.4–4)

## 2021-04-13 PROCEDURE — 84439 ASSAY OF FREE THYROXINE: CPT | Performed by: INTERNAL MEDICINE

## 2021-04-13 PROCEDURE — 80076 HEPATIC FUNCTION PANEL: CPT | Performed by: INTERNAL MEDICINE

## 2021-04-13 PROCEDURE — 84443 ASSAY THYROID STIM HORMONE: CPT | Performed by: INTERNAL MEDICINE

## 2021-04-13 PROCEDURE — 36415 COLL VENOUS BLD VENIPUNCTURE: CPT | Mod: PO | Performed by: INTERNAL MEDICINE

## 2021-05-12 ENCOUNTER — PATIENT MESSAGE (OUTPATIENT)
Dept: RESEARCH | Facility: HOSPITAL | Age: 46
End: 2021-05-12

## 2021-05-13 ENCOUNTER — TELEPHONE (OUTPATIENT)
Dept: ADMINISTRATIVE | Facility: HOSPITAL | Age: 46
End: 2021-05-13

## 2021-06-10 RX ORDER — PREDNISONE 5 MG/1
5 TABLET ORAL NIGHTLY
Qty: 30 TABLET | Refills: 3 | Status: SHIPPED | OUTPATIENT
Start: 2021-06-10 | End: 2021-10-21

## 2021-06-11 ENCOUNTER — TELEPHONE (OUTPATIENT)
Dept: ADMINISTRATIVE | Facility: HOSPITAL | Age: 46
End: 2021-06-11

## 2021-07-01 ENCOUNTER — PATIENT MESSAGE (OUTPATIENT)
Dept: ADMINISTRATIVE | Facility: OTHER | Age: 46
End: 2021-07-01

## 2021-07-06 ENCOUNTER — LAB VISIT (OUTPATIENT)
Dept: LAB | Facility: HOSPITAL | Age: 46
End: 2021-07-06
Attending: INTERNAL MEDICINE
Payer: OTHER GOVERNMENT

## 2021-07-06 DIAGNOSIS — E78.2 MIXED HYPERLIPIDEMIA: ICD-10-CM

## 2021-07-06 DIAGNOSIS — E11.9 TYPE 2 DIABETES MELLITUS WITHOUT COMPLICATION, WITHOUT LONG-TERM CURRENT USE OF INSULIN: ICD-10-CM

## 2021-07-06 LAB
ESTIMATED AVG GLUCOSE: 197 MG/DL (ref 68–131)
HBA1C MFR BLD: 8.5 % (ref 4–5.6)

## 2021-07-06 PROCEDURE — 36415 COLL VENOUS BLD VENIPUNCTURE: CPT | Mod: PO | Performed by: INTERNAL MEDICINE

## 2021-07-06 PROCEDURE — 83036 HEMOGLOBIN GLYCOSYLATED A1C: CPT | Performed by: INTERNAL MEDICINE

## 2021-07-06 PROCEDURE — 80061 LIPID PANEL: CPT | Performed by: INTERNAL MEDICINE

## 2021-07-07 LAB
CHOLEST SERPL-MCNC: 211 MG/DL (ref 120–199)
CHOLEST/HDLC SERPL: 3.9 {RATIO} (ref 2–5)
HDLC SERPL-MCNC: 54 MG/DL (ref 40–75)
HDLC SERPL: 25.6 % (ref 20–50)
LDLC SERPL CALC-MCNC: ABNORMAL MG/DL (ref 63–159)
NONHDLC SERPL-MCNC: 157 MG/DL
TRIGL SERPL-MCNC: 549 MG/DL (ref 30–150)

## 2021-07-12 ENCOUNTER — OFFICE VISIT (OUTPATIENT)
Dept: FAMILY MEDICINE | Facility: CLINIC | Age: 46
End: 2021-07-12
Payer: OTHER GOVERNMENT

## 2021-07-12 ENCOUNTER — HOSPITAL ENCOUNTER (OUTPATIENT)
Dept: RADIOLOGY | Facility: HOSPITAL | Age: 46
Discharge: HOME | End: 2021-07-12
Attending: INTERNAL MEDICINE
Payer: OTHER GOVERNMENT

## 2021-07-12 VITALS
HEART RATE: 74 BPM | TEMPERATURE: 98 F | BODY MASS INDEX: 22.33 KG/M2 | WEIGHT: 134 LBS | DIASTOLIC BLOOD PRESSURE: 72 MMHG | HEIGHT: 65 IN | SYSTOLIC BLOOD PRESSURE: 112 MMHG

## 2021-07-12 DIAGNOSIS — M79.644 PAIN OF FINGER OF RIGHT HAND: ICD-10-CM

## 2021-07-12 DIAGNOSIS — M25.50 ARTHRALGIA, UNSPECIFIED JOINT: Primary | ICD-10-CM

## 2021-07-12 DIAGNOSIS — E11.9 TYPE 2 DIABETES MELLITUS WITHOUT COMPLICATION, WITHOUT LONG-TERM CURRENT USE OF INSULIN: ICD-10-CM

## 2021-07-12 DIAGNOSIS — N05.0 MINIMAL CHANGE DISEASE: ICD-10-CM

## 2021-07-12 DIAGNOSIS — E03.4 HYPOTHYROIDISM DUE TO ACQUIRED ATROPHY OF THYROID: Primary | ICD-10-CM

## 2021-07-12 DIAGNOSIS — E78.2 MIXED HYPERLIPIDEMIA: ICD-10-CM

## 2021-07-12 PROCEDURE — 99213 OFFICE O/P EST LOW 20 MIN: CPT | Mod: PBBFAC,PO | Performed by: INTERNAL MEDICINE

## 2021-07-12 PROCEDURE — 73130 X-RAY EXAM OF HAND: CPT | Mod: TC,PO,RT

## 2021-07-12 PROCEDURE — 73130 XR HAND COMPLETE 3 VIEW RIGHT: ICD-10-PCS | Mod: 26,RT,, | Performed by: RADIOLOGY

## 2021-07-12 PROCEDURE — 99214 PR OFFICE/OUTPT VISIT, EST, LEVL IV, 30-39 MIN: ICD-10-PCS | Mod: S$PBB,,, | Performed by: INTERNAL MEDICINE

## 2021-07-12 PROCEDURE — 99999 PR PBB SHADOW E&M-EST. PATIENT-LVL III: ICD-10-PCS | Mod: PBBFAC,,, | Performed by: INTERNAL MEDICINE

## 2021-07-12 PROCEDURE — 73130 X-RAY EXAM OF HAND: CPT | Mod: 26,RT,, | Performed by: RADIOLOGY

## 2021-07-12 PROCEDURE — 99214 OFFICE O/P EST MOD 30 MIN: CPT | Mod: S$PBB,,, | Performed by: INTERNAL MEDICINE

## 2021-07-12 PROCEDURE — 99999 PR PBB SHADOW E&M-EST. PATIENT-LVL III: CPT | Mod: PBBFAC,,, | Performed by: INTERNAL MEDICINE

## 2021-07-12 RX ORDER — PRAVASTATIN SODIUM 20 MG/1
20 TABLET ORAL DAILY
Qty: 90 TABLET | Refills: 3 | Status: SHIPPED | OUTPATIENT
Start: 2021-07-12 | End: 2021-11-04

## 2021-07-12 RX ORDER — METFORMIN HYDROCHLORIDE 1000 MG/1
1000 TABLET ORAL 2 TIMES DAILY WITH MEALS
Qty: 180 TABLET | Refills: 3 | Status: SHIPPED | OUTPATIENT
Start: 2021-07-12 | End: 2022-05-02 | Stop reason: SDUPTHER

## 2021-07-28 ENCOUNTER — LAB VISIT (OUTPATIENT)
Dept: LAB | Facility: HOSPITAL | Age: 46
End: 2021-07-28
Payer: OTHER GOVERNMENT

## 2021-07-28 DIAGNOSIS — R80.9 NEPHROTIC RANGE PROTEINURIA: ICD-10-CM

## 2021-07-28 DIAGNOSIS — N05.0 MINIMAL CHANGE DISEASE: ICD-10-CM

## 2021-07-28 LAB
ALBUMIN SERPL BCP-MCNC: 4.2 G/DL (ref 3.5–5.2)
ANION GAP SERPL CALC-SCNC: 10 MMOL/L (ref 8–16)
BUN SERPL-MCNC: 19 MG/DL (ref 6–20)
CALCIUM SERPL-MCNC: 10.4 MG/DL (ref 8.7–10.5)
CHLORIDE SERPL-SCNC: 104 MMOL/L (ref 95–110)
CO2 SERPL-SCNC: 27 MMOL/L (ref 23–29)
CREAT SERPL-MCNC: 0.8 MG/DL (ref 0.5–1.4)
EST. GFR  (AFRICAN AMERICAN): >60 ML/MIN/1.73 M^2
EST. GFR  (NON AFRICAN AMERICAN): >60 ML/MIN/1.73 M^2
GLUCOSE SERPL-MCNC: 134 MG/DL (ref 70–110)
PHOSPHATE SERPL-MCNC: 3.8 MG/DL (ref 2.7–4.5)
POTASSIUM SERPL-SCNC: 3.6 MMOL/L (ref 3.5–5.1)
SODIUM SERPL-SCNC: 141 MMOL/L (ref 136–145)

## 2021-07-28 PROCEDURE — 36415 COLL VENOUS BLD VENIPUNCTURE: CPT | Mod: PO | Performed by: INTERNAL MEDICINE

## 2021-07-28 PROCEDURE — 80069 RENAL FUNCTION PANEL: CPT | Performed by: INTERNAL MEDICINE

## 2021-07-29 ENCOUNTER — LAB VISIT (OUTPATIENT)
Dept: LAB | Facility: HOSPITAL | Age: 46
End: 2021-07-29
Attending: INTERNAL MEDICINE
Payer: OTHER GOVERNMENT

## 2021-07-29 ENCOUNTER — PATIENT OUTREACH (OUTPATIENT)
Dept: ADMINISTRATIVE | Facility: OTHER | Age: 46
End: 2021-07-29

## 2021-07-29 DIAGNOSIS — M25.50 ARTHRALGIA, UNSPECIFIED JOINT: ICD-10-CM

## 2021-07-29 PROCEDURE — 86200 CCP ANTIBODY: CPT | Performed by: INTERNAL MEDICINE

## 2021-07-29 PROCEDURE — 86431 RHEUMATOID FACTOR QUANT: CPT | Performed by: INTERNAL MEDICINE

## 2021-07-29 PROCEDURE — 86038 ANTINUCLEAR ANTIBODIES: CPT | Performed by: INTERNAL MEDICINE

## 2021-07-29 PROCEDURE — 36415 COLL VENOUS BLD VENIPUNCTURE: CPT | Mod: PO | Performed by: INTERNAL MEDICINE

## 2021-07-30 ENCOUNTER — TELEPHONE (OUTPATIENT)
Dept: FAMILY MEDICINE | Facility: CLINIC | Age: 46
End: 2021-07-30

## 2021-07-30 LAB
ANA SER QL IF: NORMAL
CCP AB SER IA-ACNC: <0.5 U/ML
RHEUMATOID FACT SERPL-ACNC: 11 IU/ML (ref 0–15)

## 2021-08-02 ENCOUNTER — OFFICE VISIT (OUTPATIENT)
Dept: NEPHROLOGY | Facility: CLINIC | Age: 46
End: 2021-08-02
Payer: OTHER GOVERNMENT

## 2021-08-02 VITALS
HEIGHT: 65 IN | WEIGHT: 134.5 LBS | OXYGEN SATURATION: 97 % | BODY MASS INDEX: 22.41 KG/M2 | HEART RATE: 83 BPM | SYSTOLIC BLOOD PRESSURE: 130 MMHG | DIASTOLIC BLOOD PRESSURE: 66 MMHG

## 2021-08-02 DIAGNOSIS — N05.0 MINIMAL CHANGE DISEASE: ICD-10-CM

## 2021-08-02 DIAGNOSIS — R80.9 NEPHROTIC RANGE PROTEINURIA: Primary | ICD-10-CM

## 2021-08-02 PROCEDURE — 99999 PR PBB SHADOW E&M-EST. PATIENT-LVL III: ICD-10-PCS | Mod: PBBFAC,,, | Performed by: INTERNAL MEDICINE

## 2021-08-02 PROCEDURE — 99213 OFFICE O/P EST LOW 20 MIN: CPT | Mod: PBBFAC,PO | Performed by: INTERNAL MEDICINE

## 2021-08-02 PROCEDURE — 99999 PR PBB SHADOW E&M-EST. PATIENT-LVL III: CPT | Mod: PBBFAC,,, | Performed by: INTERNAL MEDICINE

## 2021-08-02 PROCEDURE — 99214 OFFICE O/P EST MOD 30 MIN: CPT | Mod: S$PBB,,, | Performed by: INTERNAL MEDICINE

## 2021-08-02 PROCEDURE — 99214 PR OFFICE/OUTPT VISIT, EST, LEVL IV, 30-39 MIN: ICD-10-PCS | Mod: S$PBB,,, | Performed by: INTERNAL MEDICINE

## 2021-08-02 RX ORDER — IVERMECTIN 3 MG/1
12 TABLET ORAL DAILY
Qty: 20 TABLET | Refills: 1 | Status: SHIPPED | OUTPATIENT
Start: 2021-08-02 | End: 2021-08-07

## 2021-08-02 RX ORDER — AMOXICILLIN 250 MG/1
250 CAPSULE ORAL EVERY 12 HOURS
Qty: 14 CAPSULE | Refills: 0 | Status: SHIPPED | OUTPATIENT
Start: 2021-08-02 | End: 2021-10-21

## 2021-08-05 ENCOUNTER — CLINICAL SUPPORT (OUTPATIENT)
Dept: FAMILY MEDICINE | Facility: CLINIC | Age: 46
End: 2021-08-05
Payer: OTHER GOVERNMENT

## 2021-08-05 DIAGNOSIS — Z23 IMMUNIZATION DUE: Primary | ICD-10-CM

## 2021-08-05 PROCEDURE — 90471 IMMUNIZATION ADMIN: CPT | Mod: PBBFAC,PO

## 2021-08-20 ENCOUNTER — TELEPHONE (OUTPATIENT)
Dept: INTERNAL MEDICINE | Facility: CLINIC | Age: 46
End: 2021-08-20

## 2021-09-29 DIAGNOSIS — E11.9 TYPE 2 DIABETES MELLITUS WITHOUT COMPLICATION: ICD-10-CM

## 2021-10-21 ENCOUNTER — LAB VISIT (OUTPATIENT)
Dept: LAB | Facility: HOSPITAL | Age: 46
End: 2021-10-21
Attending: NURSE PRACTITIONER
Payer: OTHER GOVERNMENT

## 2021-10-21 ENCOUNTER — OFFICE VISIT (OUTPATIENT)
Dept: FAMILY MEDICINE | Facility: CLINIC | Age: 46
End: 2021-10-21
Payer: OTHER GOVERNMENT

## 2021-10-21 VITALS
HEART RATE: 69 BPM | HEIGHT: 62 IN | TEMPERATURE: 98 F | BODY MASS INDEX: 24.29 KG/M2 | SYSTOLIC BLOOD PRESSURE: 117 MMHG | DIASTOLIC BLOOD PRESSURE: 76 MMHG | WEIGHT: 132 LBS

## 2021-10-21 DIAGNOSIS — Z01.89 ENCOUNTER FOR LABORATORY EXAMINATION: ICD-10-CM

## 2021-10-21 DIAGNOSIS — R10.9 ABDOMINAL PAIN, UNSPECIFIED ABDOMINAL LOCATION: Primary | ICD-10-CM

## 2021-10-21 LAB
ALBUMIN SERPL BCP-MCNC: 4.2 G/DL (ref 3.5–5.2)
ALP SERPL-CCNC: 42 U/L (ref 55–135)
ALT SERPL W/O P-5'-P-CCNC: 15 U/L (ref 10–44)
ANION GAP SERPL CALC-SCNC: 10 MMOL/L (ref 8–16)
AST SERPL-CCNC: 20 U/L (ref 10–40)
BILIRUB SERPL-MCNC: 0.6 MG/DL (ref 0.1–1)
BUN SERPL-MCNC: 17 MG/DL (ref 6–20)
CALCIUM SERPL-MCNC: 9.8 MG/DL (ref 8.7–10.5)
CHLORIDE SERPL-SCNC: 106 MMOL/L (ref 95–110)
CHOLEST SERPL-MCNC: 212 MG/DL (ref 120–199)
CHOLEST/HDLC SERPL: 3.5 {RATIO} (ref 2–5)
CO2 SERPL-SCNC: 26 MMOL/L (ref 23–29)
CREAT SERPL-MCNC: 0.9 MG/DL (ref 0.5–1.4)
EST. GFR  (AFRICAN AMERICAN): >60 ML/MIN/1.73 M^2
EST. GFR  (NON AFRICAN AMERICAN): >60 ML/MIN/1.73 M^2
GLUCOSE SERPL-MCNC: 81 MG/DL (ref 70–110)
HDLC SERPL-MCNC: 61 MG/DL (ref 40–75)
HDLC SERPL: 28.8 % (ref 20–50)
LDLC SERPL CALC-MCNC: 118.8 MG/DL (ref 63–159)
NONHDLC SERPL-MCNC: 151 MG/DL
POTASSIUM SERPL-SCNC: 3.8 MMOL/L (ref 3.5–5.1)
PROT SERPL-MCNC: 7 G/DL (ref 6–8.4)
SODIUM SERPL-SCNC: 142 MMOL/L (ref 136–145)
TRIGL SERPL-MCNC: 161 MG/DL (ref 30–150)

## 2021-10-21 PROCEDURE — 83036 HEMOGLOBIN GLYCOSYLATED A1C: CPT | Performed by: NURSE PRACTITIONER

## 2021-10-21 PROCEDURE — 99214 OFFICE O/P EST MOD 30 MIN: CPT | Mod: S$PBB,,, | Performed by: NURSE PRACTITIONER

## 2021-10-21 PROCEDURE — 80061 LIPID PANEL: CPT | Performed by: NURSE PRACTITIONER

## 2021-10-21 PROCEDURE — 36415 COLL VENOUS BLD VENIPUNCTURE: CPT | Mod: PO | Performed by: NURSE PRACTITIONER

## 2021-10-21 PROCEDURE — 99999 PR PBB SHADOW E&M-EST. PATIENT-LVL IV: CPT | Mod: PBBFAC,,, | Performed by: NURSE PRACTITIONER

## 2021-10-21 PROCEDURE — 99999 PR PBB SHADOW E&M-EST. PATIENT-LVL IV: ICD-10-PCS | Mod: PBBFAC,,, | Performed by: NURSE PRACTITIONER

## 2021-10-21 PROCEDURE — 80053 COMPREHEN METABOLIC PANEL: CPT | Performed by: NURSE PRACTITIONER

## 2021-10-21 PROCEDURE — 99214 OFFICE O/P EST MOD 30 MIN: CPT | Mod: PBBFAC,PO | Performed by: NURSE PRACTITIONER

## 2021-10-21 PROCEDURE — 99214 PR OFFICE/OUTPT VISIT, EST, LEVL IV, 30-39 MIN: ICD-10-PCS | Mod: S$PBB,,, | Performed by: NURSE PRACTITIONER

## 2021-10-22 ENCOUNTER — PATIENT MESSAGE (OUTPATIENT)
Dept: FAMILY MEDICINE | Facility: CLINIC | Age: 46
End: 2021-10-22
Payer: OTHER GOVERNMENT

## 2021-10-22 LAB
ESTIMATED AVG GLUCOSE: 143 MG/DL (ref 68–131)
HBA1C MFR BLD: 6.6 % (ref 4–5.6)

## 2021-10-25 ENCOUNTER — PATIENT OUTREACH (OUTPATIENT)
Dept: ADMINISTRATIVE | Facility: HOSPITAL | Age: 46
End: 2021-10-25
Payer: OTHER GOVERNMENT

## 2021-11-02 ENCOUNTER — PATIENT OUTREACH (OUTPATIENT)
Dept: ADMINISTRATIVE | Facility: OTHER | Age: 46
End: 2021-11-02
Payer: OTHER GOVERNMENT

## 2021-11-02 ENCOUNTER — HOSPITAL ENCOUNTER (OUTPATIENT)
Dept: RADIOLOGY | Facility: HOSPITAL | Age: 46
Discharge: HOME | End: 2021-11-02
Attending: NURSE PRACTITIONER
Payer: OTHER GOVERNMENT

## 2021-11-02 DIAGNOSIS — R10.9 ABDOMINAL PAIN, UNSPECIFIED ABDOMINAL LOCATION: ICD-10-CM

## 2021-11-02 PROCEDURE — 76705 ECHO EXAM OF ABDOMEN: CPT | Mod: 26,,, | Performed by: RADIOLOGY

## 2021-11-02 PROCEDURE — 76705 US ABDOMEN LIMITED: ICD-10-PCS | Mod: 26,,, | Performed by: RADIOLOGY

## 2021-11-02 PROCEDURE — 76705 ECHO EXAM OF ABDOMEN: CPT | Mod: TC,PO

## 2021-11-04 ENCOUNTER — OFFICE VISIT (OUTPATIENT)
Dept: GASTROENTEROLOGY | Facility: CLINIC | Age: 46
End: 2021-11-04
Payer: OTHER GOVERNMENT

## 2021-11-04 VITALS — WEIGHT: 131.19 LBS | BODY MASS INDEX: 24.14 KG/M2 | HEIGHT: 62 IN

## 2021-11-04 DIAGNOSIS — R19.7 DIARRHEA, UNSPECIFIED TYPE: ICD-10-CM

## 2021-11-04 DIAGNOSIS — R10.11 RIGHT UPPER QUADRANT PAIN: ICD-10-CM

## 2021-11-04 DIAGNOSIS — Z86.19 HISTORY OF HELICOBACTER PYLORI INFECTION: ICD-10-CM

## 2021-11-04 DIAGNOSIS — R13.14 PHARYNGOESOPHAGEAL DYSPHAGIA: ICD-10-CM

## 2021-11-04 DIAGNOSIS — R10.12 LEFT UPPER QUADRANT ABDOMINAL PAIN: Primary | ICD-10-CM

## 2021-11-04 DIAGNOSIS — R19.5 CHANGE IN STOOL CALIBER: ICD-10-CM

## 2021-11-04 DIAGNOSIS — R94.8 ABNORMAL BILIARY HIDA SCAN: ICD-10-CM

## 2021-11-04 DIAGNOSIS — Z87.898 HISTORY OF HEADACHE: ICD-10-CM

## 2021-11-04 PROCEDURE — 99204 PR OFFICE/OUTPT VISIT, NEW, LEVL IV, 45-59 MIN: ICD-10-PCS | Mod: S$PBB,,, | Performed by: NURSE PRACTITIONER

## 2021-11-04 PROCEDURE — 99999 PR PBB SHADOW E&M-EST. PATIENT-LVL IV: ICD-10-PCS | Mod: PBBFAC,,, | Performed by: NURSE PRACTITIONER

## 2021-11-04 PROCEDURE — 99999 PR PBB SHADOW E&M-EST. PATIENT-LVL IV: CPT | Mod: PBBFAC,,, | Performed by: NURSE PRACTITIONER

## 2021-11-04 PROCEDURE — 99204 OFFICE O/P NEW MOD 45 MIN: CPT | Mod: S$PBB,,, | Performed by: NURSE PRACTITIONER

## 2021-11-04 PROCEDURE — 99214 OFFICE O/P EST MOD 30 MIN: CPT | Mod: PBBFAC,PO | Performed by: NURSE PRACTITIONER

## 2021-11-04 RX ORDER — PREDNISONE 5 MG/1
5 TABLET ORAL DAILY
COMMUNITY
End: 2022-07-13

## 2021-11-05 ENCOUNTER — LAB VISIT (OUTPATIENT)
Dept: LAB | Facility: HOSPITAL | Age: 46
End: 2021-11-05
Attending: NURSE PRACTITIONER
Payer: OTHER GOVERNMENT

## 2021-11-05 DIAGNOSIS — R19.7 DIARRHEA, UNSPECIFIED TYPE: ICD-10-CM

## 2021-11-05 DIAGNOSIS — Z86.19 HISTORY OF HELICOBACTER PYLORI INFECTION: ICD-10-CM

## 2021-11-05 DIAGNOSIS — R10.12 LEFT UPPER QUADRANT ABDOMINAL PAIN: ICD-10-CM

## 2021-11-05 PROCEDURE — 87046 STOOL CULTR AEROBIC BACT EA: CPT | Performed by: NURSE PRACTITIONER

## 2021-11-05 PROCEDURE — 87045 FECES CULTURE AEROBIC BACT: CPT | Performed by: NURSE PRACTITIONER

## 2021-11-05 PROCEDURE — 87425 ROTAVIRUS AG IA: CPT | Performed by: NURSE PRACTITIONER

## 2021-11-05 PROCEDURE — 87329 GIARDIA AG IA: CPT | Performed by: NURSE PRACTITIONER

## 2021-11-05 PROCEDURE — 87798 DETECT AGENT NOS DNA AMP: CPT | Performed by: NURSE PRACTITIONER

## 2021-11-05 PROCEDURE — 87177 OVA AND PARASITES SMEARS: CPT | Performed by: NURSE PRACTITIONER

## 2021-11-05 PROCEDURE — 87427 SHIGA-LIKE TOXIN AG IA: CPT | Mod: 59 | Performed by: NURSE PRACTITIONER

## 2021-11-05 PROCEDURE — 89055 LEUKOCYTE ASSESSMENT FECAL: CPT | Performed by: NURSE PRACTITIONER

## 2021-11-05 PROCEDURE — 87338 HPYLORI STOOL AG IA: CPT | Performed by: NURSE PRACTITIONER

## 2021-11-05 PROCEDURE — 87209 SMEAR COMPLEX STAIN: CPT | Performed by: NURSE PRACTITIONER

## 2021-11-05 PROCEDURE — 82272 OCCULT BLD FECES 1-3 TESTS: CPT | Performed by: NURSE PRACTITIONER

## 2021-11-05 PROCEDURE — 83986 ASSAY PH BODY FLUID NOS: CPT | Performed by: NURSE PRACTITIONER

## 2021-11-05 PROCEDURE — 82705 FATS/LIPIDS FECES QUAL: CPT | Performed by: NURSE PRACTITIONER

## 2021-11-06 LAB
OB PNL STL: NEGATIVE
RV AG STL QL IA.RAPID: NEGATIVE
WBC #/AREA STL HPF: NORMAL /[HPF]

## 2021-11-08 LAB
CRYPTOSP AG STL QL IA: NEGATIVE
E COLI SXT1 STL QL IA: NEGATIVE
E COLI SXT2 STL QL IA: NEGATIVE
G LAMBLIA AG STL QL IA: NEGATIVE
PH STL: 6 [PH] (ref 5–8.5)

## 2021-11-09 LAB
BACTERIA STL CULT: NORMAL
FAT STL QL: NORMAL
HADV DNA SERPL QL NAA+PROBE: NEGATIVE
NEUTRAL FAT STL QL: NORMAL
O+P STL MICRO: NORMAL
SPECIMEN SOURCE: NORMAL

## 2021-11-10 LAB
H PYLORI AG STL QL IA: NORMAL
SPECIMEN SOURCE: NORMAL

## 2021-11-11 ENCOUNTER — TELEPHONE (OUTPATIENT)
Dept: GASTROENTEROLOGY | Facility: CLINIC | Age: 46
End: 2021-11-11
Payer: OTHER GOVERNMENT

## 2021-11-29 ENCOUNTER — TELEPHONE (OUTPATIENT)
Dept: GASTROENTEROLOGY | Facility: CLINIC | Age: 46
End: 2021-11-29
Payer: OTHER GOVERNMENT

## 2021-11-29 DIAGNOSIS — Z01.818 PRE-OP TESTING: ICD-10-CM

## 2021-12-01 ENCOUNTER — ANESTHESIA (OUTPATIENT)
Dept: ENDOSCOPY | Facility: HOSPITAL | Age: 46
End: 2021-12-01
Payer: OTHER GOVERNMENT

## 2021-12-01 ENCOUNTER — ANESTHESIA EVENT (OUTPATIENT)
Dept: ENDOSCOPY | Facility: HOSPITAL | Age: 46
End: 2021-12-01
Payer: OTHER GOVERNMENT

## 2021-12-01 ENCOUNTER — HOSPITAL ENCOUNTER (OUTPATIENT)
Facility: HOSPITAL | Age: 46
Discharge: HOME | End: 2021-12-01
Attending: INTERNAL MEDICINE | Admitting: INTERNAL MEDICINE
Payer: OTHER GOVERNMENT

## 2021-12-01 VITALS
BODY MASS INDEX: 21.74 KG/M2 | RESPIRATION RATE: 16 BRPM | SYSTOLIC BLOOD PRESSURE: 130 MMHG | TEMPERATURE: 98 F | DIASTOLIC BLOOD PRESSURE: 72 MMHG | HEART RATE: 60 BPM | OXYGEN SATURATION: 100 % | WEIGHT: 122.69 LBS | HEIGHT: 63 IN

## 2021-12-01 DIAGNOSIS — G89.29 ABDOMINAL PAIN, CHRONIC, LEFT UPPER QUADRANT: ICD-10-CM

## 2021-12-01 DIAGNOSIS — R10.12 LEFT UPPER QUADRANT ABDOMINAL PAIN: Primary | ICD-10-CM

## 2021-12-01 DIAGNOSIS — R10.12 ABDOMINAL PAIN, CHRONIC, LEFT UPPER QUADRANT: ICD-10-CM

## 2021-12-01 LAB
GLUCOSE SERPL-MCNC: 97 MG/DL (ref 70–110)
SARS-COV-2 RDRP RESP QL NAA+PROBE: NEGATIVE

## 2021-12-01 PROCEDURE — 45378 DIAGNOSTIC COLONOSCOPY: CPT | Mod: PO | Performed by: INTERNAL MEDICINE

## 2021-12-01 PROCEDURE — 00813 ANES UPR LWR GI NDSC PX: CPT | Mod: PO | Performed by: INTERNAL MEDICINE

## 2021-12-01 PROCEDURE — D9220A PRA ANESTHESIA: Mod: ANES,,, | Performed by: ANESTHESIOLOGY

## 2021-12-01 PROCEDURE — 63600175 PHARM REV CODE 636 W HCPCS: Mod: PO | Performed by: NURSE ANESTHETIST, CERTIFIED REGISTERED

## 2021-12-01 PROCEDURE — D9220A PRA ANESTHESIA: Mod: CRNA,,, | Performed by: NURSE ANESTHETIST, CERTIFIED REGISTERED

## 2021-12-01 PROCEDURE — 43239 EGD BIOPSY SINGLE/MULTIPLE: CPT | Mod: 59,,, | Performed by: INTERNAL MEDICINE

## 2021-12-01 PROCEDURE — 37000008 HC ANESTHESIA 1ST 15 MINUTES: Mod: PO | Performed by: INTERNAL MEDICINE

## 2021-12-01 PROCEDURE — 45378 PR COLONOSCOPY,DIAGNOSTIC: ICD-10-PCS | Mod: ,,, | Performed by: INTERNAL MEDICINE

## 2021-12-01 PROCEDURE — 43248 EGD GUIDE WIRE INSERTION: CPT | Mod: ,,, | Performed by: INTERNAL MEDICINE

## 2021-12-01 PROCEDURE — 88342 CHG IMMUNOCYTOCHEMISTRY: ICD-10-PCS | Mod: 26,,, | Performed by: PATHOLOGY

## 2021-12-01 PROCEDURE — 88342 IMHCHEM/IMCYTCHM 1ST ANTB: CPT | Mod: 26,,, | Performed by: PATHOLOGY

## 2021-12-01 PROCEDURE — D9220A PRA ANESTHESIA: ICD-10-PCS | Mod: CRNA,,, | Performed by: NURSE ANESTHETIST, CERTIFIED REGISTERED

## 2021-12-01 PROCEDURE — 43248 PR EGD, FLEX, W/DILATION OVER GUIDEWIRE: ICD-10-PCS | Mod: ,,, | Performed by: INTERNAL MEDICINE

## 2021-12-01 PROCEDURE — 88305 TISSUE EXAM BY PATHOLOGIST: ICD-10-PCS | Mod: 26,,, | Performed by: PATHOLOGY

## 2021-12-01 PROCEDURE — 43248 EGD GUIDE WIRE INSERTION: CPT | Mod: PO | Performed by: INTERNAL MEDICINE

## 2021-12-01 PROCEDURE — 27201012 HC FORCEPS, HOT/COLD, DISP: Mod: PO | Performed by: INTERNAL MEDICINE

## 2021-12-01 PROCEDURE — 63600175 PHARM REV CODE 636 W HCPCS: Mod: PO | Performed by: INTERNAL MEDICINE

## 2021-12-01 PROCEDURE — 43239 PR EGD, FLEX, W/BIOPSY, SGL/MULTI: ICD-10-PCS | Mod: 59,,, | Performed by: INTERNAL MEDICINE

## 2021-12-01 PROCEDURE — 88342 IMHCHEM/IMCYTCHM 1ST ANTB: CPT | Performed by: PATHOLOGY

## 2021-12-01 PROCEDURE — U0002 COVID-19 LAB TEST NON-CDC: HCPCS | Mod: PO | Performed by: ANESTHESIOLOGY

## 2021-12-01 PROCEDURE — 37000009 HC ANESTHESIA EA ADD 15 MINS: Mod: PO | Performed by: INTERNAL MEDICINE

## 2021-12-01 PROCEDURE — 88305 TISSUE EXAM BY PATHOLOGIST: CPT | Mod: 59 | Performed by: PATHOLOGY

## 2021-12-01 PROCEDURE — 88305 TISSUE EXAM BY PATHOLOGIST: CPT | Mod: 26,,, | Performed by: PATHOLOGY

## 2021-12-01 PROCEDURE — 43239 EGD BIOPSY SINGLE/MULTIPLE: CPT | Mod: PO | Performed by: INTERNAL MEDICINE

## 2021-12-01 PROCEDURE — 45378 DIAGNOSTIC COLONOSCOPY: CPT | Mod: ,,, | Performed by: INTERNAL MEDICINE

## 2021-12-01 PROCEDURE — 43251 EGD REMOVE LESION SNARE: CPT | Mod: PO | Performed by: INTERNAL MEDICINE

## 2021-12-01 PROCEDURE — 25000003 PHARM REV CODE 250: Mod: PO | Performed by: NURSE ANESTHETIST, CERTIFIED REGISTERED

## 2021-12-01 PROCEDURE — D9220A PRA ANESTHESIA: ICD-10-PCS | Mod: ANES,,, | Performed by: ANESTHESIOLOGY

## 2021-12-01 RX ORDER — LIDOCAINE HCL/PF 100 MG/5ML
SYRINGE (ML) INTRAVENOUS
Status: DISCONTINUED | OUTPATIENT
Start: 2021-12-01 | End: 2021-12-01

## 2021-12-01 RX ORDER — FENTANYL CITRATE 50 UG/ML
INJECTION, SOLUTION INTRAMUSCULAR; INTRAVENOUS
Status: DISCONTINUED | OUTPATIENT
Start: 2021-12-01 | End: 2021-12-01

## 2021-12-01 RX ORDER — PROPOFOL 10 MG/ML
VIAL (ML) INTRAVENOUS CONTINUOUS PRN
Status: DISCONTINUED | OUTPATIENT
Start: 2021-12-01 | End: 2021-12-01

## 2021-12-01 RX ORDER — PROPOFOL 10 MG/ML
VIAL (ML) INTRAVENOUS
Status: DISCONTINUED | OUTPATIENT
Start: 2021-12-01 | End: 2021-12-01

## 2021-12-01 RX ORDER — SODIUM CHLORIDE 0.9 % (FLUSH) 0.9 %
10 SYRINGE (ML) INJECTION EVERY 6 HOURS PRN
Status: DISCONTINUED | OUTPATIENT
Start: 2021-12-01 | End: 2021-12-01 | Stop reason: HOSPADM

## 2021-12-01 RX ORDER — SODIUM CHLORIDE, SODIUM LACTATE, POTASSIUM CHLORIDE, CALCIUM CHLORIDE 600; 310; 30; 20 MG/100ML; MG/100ML; MG/100ML; MG/100ML
INJECTION, SOLUTION INTRAVENOUS CONTINUOUS
Status: DISCONTINUED | OUTPATIENT
Start: 2021-12-01 | End: 2021-12-01 | Stop reason: HOSPADM

## 2021-12-01 RX ADMIN — LIDOCAINE HYDROCHLORIDE 100 MG: 20 INJECTION, SOLUTION INTRAVENOUS at 11:12

## 2021-12-01 RX ADMIN — PROPOFOL 50 MG: 10 INJECTION, EMULSION INTRAVENOUS at 11:12

## 2021-12-01 RX ADMIN — FENTANYL CITRATE 50 MCG: 50 INJECTION, SOLUTION INTRAMUSCULAR; INTRAVENOUS at 11:12

## 2021-12-01 RX ADMIN — PROPOFOL 75 MCG/KG/MIN: 10 INJECTION, EMULSION INTRAVENOUS at 11:12

## 2021-12-01 RX ADMIN — SODIUM CHLORIDE, SODIUM LACTATE, POTASSIUM CHLORIDE, AND CALCIUM CHLORIDE: .6; .31; .03; .02 INJECTION, SOLUTION INTRAVENOUS at 11:12

## 2021-12-08 LAB
FINAL PATHOLOGIC DIAGNOSIS: NORMAL
Lab: NORMAL

## 2022-01-07 ENCOUNTER — TELEPHONE (OUTPATIENT)
Dept: GASTROENTEROLOGY | Facility: CLINIC | Age: 47
End: 2022-01-07
Payer: OTHER GOVERNMENT

## 2022-01-07 NOTE — TELEPHONE ENCOUNTER
----- Message from Дмитрий Johnson MD sent at 1/7/2022 11:02 AM CST -----  Please notify patient that biopsies from EGD are negative for abnormality. We will plan esophageal manometry and barium esophagram as next step in evaluation of dysphagia if no improvement in dysphagia.

## 2022-01-07 NOTE — TELEPHONE ENCOUNTER
----- Message from Дмитрий Johnson MD sent at 1/7/2022 11:02 AM CST -----  Please schedule follow up with Mayra

## 2022-01-19 DIAGNOSIS — Z12.31 OTHER SCREENING MAMMOGRAM: ICD-10-CM

## 2022-01-20 ENCOUNTER — TELEPHONE (OUTPATIENT)
Dept: NEPHROLOGY | Facility: CLINIC | Age: 47
End: 2022-01-20
Payer: OTHER GOVERNMENT

## 2022-01-20 NOTE — TELEPHONE ENCOUNTER
Scott and her  Facundo called in.  I told them I thought it was GI trying to reach them with results and plan.     The GI nurse I messaged wasn't available, so I advised patient I will send message and they will be expecting the call.

## 2022-01-20 NOTE — TELEPHONE ENCOUNTER
Spoke with  and patient she is not having any dysphagia at this time, and would like to hold of on any further testing.

## 2022-01-20 NOTE — TELEPHONE ENCOUNTER
----- Message from Ivana Plaza sent at 1/20/2022  9:41 AM CST -----  Contact: pts   Type: Needs Medical Advice  Who Called:  pts   Best Call Back Number: [phone]    Additional Information: pt missed several calls, not sure what the call is inregards too, please call pt back

## 2022-02-03 ENCOUNTER — LAB VISIT (OUTPATIENT)
Dept: LAB | Facility: HOSPITAL | Age: 47
End: 2022-02-03
Attending: INTERNAL MEDICINE
Payer: OTHER GOVERNMENT

## 2022-02-03 DIAGNOSIS — R80.9 NEPHROTIC RANGE PROTEINURIA: ICD-10-CM

## 2022-02-03 DIAGNOSIS — N05.0 MINIMAL CHANGE DISEASE: ICD-10-CM

## 2022-02-03 LAB
ALBUMIN SERPL BCP-MCNC: 4.5 G/DL (ref 3.5–5.2)
ANION GAP SERPL CALC-SCNC: 12 MMOL/L (ref 8–16)
BUN SERPL-MCNC: 20 MG/DL (ref 6–20)
CALCIUM SERPL-MCNC: 9.8 MG/DL (ref 8.7–10.5)
CHLORIDE SERPL-SCNC: 106 MMOL/L (ref 95–110)
CO2 SERPL-SCNC: 23 MMOL/L (ref 23–29)
CREAT SERPL-MCNC: 0.8 MG/DL (ref 0.5–1.4)
EST. GFR  (AFRICAN AMERICAN): >60 ML/MIN/1.73 M^2
EST. GFR  (NON AFRICAN AMERICAN): >60 ML/MIN/1.73 M^2
GLUCOSE SERPL-MCNC: 99 MG/DL (ref 70–110)
PHOSPHATE SERPL-MCNC: 3.7 MG/DL (ref 2.7–4.5)
POTASSIUM SERPL-SCNC: 3.7 MMOL/L (ref 3.5–5.1)
SODIUM SERPL-SCNC: 141 MMOL/L (ref 136–145)

## 2022-02-03 PROCEDURE — 80069 RENAL FUNCTION PANEL: CPT | Performed by: INTERNAL MEDICINE

## 2022-02-03 PROCEDURE — 36415 COLL VENOUS BLD VENIPUNCTURE: CPT | Mod: PO | Performed by: INTERNAL MEDICINE

## 2022-02-09 ENCOUNTER — OFFICE VISIT (OUTPATIENT)
Dept: NEPHROLOGY | Facility: CLINIC | Age: 47
End: 2022-02-09
Payer: OTHER GOVERNMENT

## 2022-02-09 VITALS
OXYGEN SATURATION: 98 % | DIASTOLIC BLOOD PRESSURE: 80 MMHG | BODY MASS INDEX: 21.62 KG/M2 | SYSTOLIC BLOOD PRESSURE: 142 MMHG | HEART RATE: 83 BPM | HEIGHT: 63 IN | WEIGHT: 122 LBS

## 2022-02-09 DIAGNOSIS — N05.0 MINIMAL CHANGE DISEASE: Primary | ICD-10-CM

## 2022-02-09 PROCEDURE — 99999 PR PBB SHADOW E&M-EST. PATIENT-LVL III: CPT | Mod: PBBFAC,,, | Performed by: INTERNAL MEDICINE

## 2022-02-09 PROCEDURE — 99214 PR OFFICE/OUTPT VISIT, EST, LEVL IV, 30-39 MIN: ICD-10-PCS | Mod: S$PBB,,, | Performed by: INTERNAL MEDICINE

## 2022-02-09 PROCEDURE — 99213 OFFICE O/P EST LOW 20 MIN: CPT | Mod: PBBFAC,PN | Performed by: INTERNAL MEDICINE

## 2022-02-09 PROCEDURE — 99214 OFFICE O/P EST MOD 30 MIN: CPT | Mod: S$PBB,,, | Performed by: INTERNAL MEDICINE

## 2022-02-09 PROCEDURE — 99999 PR PBB SHADOW E&M-EST. PATIENT-LVL III: ICD-10-PCS | Mod: PBBFAC,,, | Performed by: INTERNAL MEDICINE

## 2022-02-09 NOTE — PROGRESS NOTES
"Subjective:       Patient ID: Giles Bravo is a 46 y.o.  female who presents for return patient evaluation for chronic renal failure.      She had a recent odontogenic infection.  She has no uremic or urinary symptoms and is in her usual state of health.  There have been no recent illnesses, hospitalizations or procedures.  She received rituxan on Feb 1st and 18th.      Review of Systems   Constitutional: Negative for fatigue.   HENT: Negative for congestion.    Respiratory: Negative for shortness of breath.    Cardiovascular: Negative for leg swelling.   Gastrointestinal: Negative for diarrhea and nausea.   Genitourinary: Negative for decreased urine volume, difficulty urinating and dysuria.   Musculoskeletal: Negative for arthralgias.   Neurological: Negative for headaches.   Psychiatric/Behavioral: Negative for confusion.       The past medical, family and social histories were reviewed for this encounter.     BP (!) 142/80 (BP Location: Right arm, Patient Position: Sitting)   Pulse 83   Ht 5' 3" (1.6 m)   Wt 55.3 kg (122 lb)   SpO2 98%   BMI 21.61 kg/m²     Objective:      Physical Exam  Vitals reviewed.   Constitutional:       General: She is not in acute distress.  HENT:      Head: Normocephalic and atraumatic.   Eyes:      General: No scleral icterus.  Neck:      Vascular: No JVD.   Cardiovascular:      Rate and Rhythm: Normal rate and regular rhythm.      Heart sounds: No murmur heard.      Pulmonary:      Effort: Pulmonary effort is normal.      Breath sounds: Normal breath sounds.   Abdominal:      General: There is no distension.      Palpations: Abdomen is soft.   Skin:     General: Skin is warm and dry.   Neurological:      Mental Status: She is alert and oriented to person, place, and time.         Assessment:       1. Minimal change disease        Plan:   Return to clinic in 6 months.  Labs for next visit include rp, upc in [place].  RP and upc in 1 -2 months.  Baseline creatinine " is 0.8.  UPC is negative.  Albumin is 4.2.  She has responded to steroids again.  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  We hope to maintain remission with steroids and rituxan.  She has a preference to get off of steroids however she has had relapse after stopping them previously.  She is s/p rituxan for a frequently relapsing steroid dependent JOSSE patient.  I will stop her prednisone as she is only on 2.5 mg and reassess her for relapse.

## 2022-03-09 RX ORDER — MAGNESIUM 30 MG
30 TABLET ORAL ONCE
Qty: 30 TABLET | Refills: 1 | Status: SHIPPED | OUTPATIENT
Start: 2022-03-09 | End: 2022-03-09

## 2022-03-24 RX ORDER — ESOMEPRAZOLE MAGNESIUM 20 MG/1
20 TABLET, DELAYED RELEASE ORAL DAILY
Qty: 30 TABLET | Refills: 2 | Status: SHIPPED | OUTPATIENT
Start: 2022-03-24 | End: 2022-06-06

## 2022-03-24 RX ORDER — ESOMEPRAZOLE MAGNESIUM 20 MG/1
TABLET, DELAYED RELEASE ORAL
COMMUNITY
End: 2022-03-24 | Stop reason: SDUPTHER

## 2022-05-02 DIAGNOSIS — E11.9 TYPE 2 DIABETES MELLITUS WITHOUT COMPLICATION, WITHOUT LONG-TERM CURRENT USE OF INSULIN: ICD-10-CM

## 2022-05-02 NOTE — TELEPHONE ENCOUNTER
----- Message from Ragini Schulte sent at 5/2/2022  4:30 PM CDT -----  Regarding: refill  Can the clinic reply in [facility]:       Please refill the medication(s) listed below. Please call the patient when the prescription(s) is ready for  at this phone number   [phone] TERESA GONZALEZ [[id number]] Facundo ( ) pt has one pill left of her medication      Medication #1 metFORMIN (GLUCOPHAGE) 1000 MG tablet          Preferred Pharmacy: [id number] [place] [facility] [address] AT [facility] OF [facility] [address]

## 2022-05-02 NOTE — TELEPHONE ENCOUNTER
Care Due:                  Date            Visit Type   Department     Provider  --------------------------------------------------------------------------------                                EP -                              PRIMARY      [facility] FAM[facility]  Last Visit: 07-      CARE ([facility])   MEDICINE       Ginna Juarez  Next Visit: None Scheduled  None         None Found                                                            Last  Test          Frequency    Reason                     Performed    Due Date  --------------------------------------------------------------------------------    Office Visit  12 months..  metFORMIN................  07- 07-    HBA1C.......  6 months...  metFORMIN................  10-   04-    Powered by [employer]. Reference number: [id number].   5/02/2022 4:47:35 PM CDT

## 2022-05-03 RX ORDER — METFORMIN HYDROCHLORIDE 1000 MG/1
1000 TABLET ORAL 2 TIMES DAILY WITH MEALS
Qty: 180 TABLET | Refills: 0 | Status: SHIPPED | OUTPATIENT
Start: 2022-05-03 | End: 2022-07-13 | Stop reason: DRUGHIGH

## 2022-05-23 ENCOUNTER — OFFICE VISIT (OUTPATIENT)
Dept: FAMILY MEDICINE | Facility: CLINIC | Age: 47
End: 2022-05-23
Payer: OTHER GOVERNMENT

## 2022-05-23 ENCOUNTER — HOSPITAL ENCOUNTER (OUTPATIENT)
Dept: RADIOLOGY | Facility: HOSPITAL | Age: 47
Discharge: HOME | End: 2022-05-23
Attending: STUDENT IN AN ORGANIZED HEALTH CARE EDUCATION/TRAINING PROGRAM
Payer: OTHER GOVERNMENT

## 2022-05-23 VITALS
RESPIRATION RATE: 18 BRPM | TEMPERATURE: 98 F | SYSTOLIC BLOOD PRESSURE: 126 MMHG | HEIGHT: 63 IN | DIASTOLIC BLOOD PRESSURE: 75 MMHG | WEIGHT: 129 LBS | HEART RATE: 94 BPM | BODY MASS INDEX: 22.86 KG/M2

## 2022-05-23 DIAGNOSIS — M25.561 ACUTE PAIN OF BOTH KNEES: ICD-10-CM

## 2022-05-23 DIAGNOSIS — M25.521 RIGHT ELBOW PAIN: ICD-10-CM

## 2022-05-23 DIAGNOSIS — M25.562 ACUTE PAIN OF BOTH KNEES: ICD-10-CM

## 2022-05-23 DIAGNOSIS — M25.562 ACUTE PAIN OF BOTH KNEES: Primary | ICD-10-CM

## 2022-05-23 DIAGNOSIS — M25.561 ACUTE PAIN OF BOTH KNEES: Primary | ICD-10-CM

## 2022-05-23 DIAGNOSIS — M65.331 TRIGGER MIDDLE FINGER OF RIGHT HAND: ICD-10-CM

## 2022-05-23 PROCEDURE — 73080 X-RAY EXAM OF ELBOW: CPT | Mod: 26,RT,, | Performed by: RADIOLOGY

## 2022-05-23 PROCEDURE — 73562 XR KNEE ORTHO BILAT: ICD-10-PCS | Mod: 26,50,, | Performed by: RADIOLOGY

## 2022-05-23 PROCEDURE — 99213 OFFICE O/P EST LOW 20 MIN: CPT | Mod: S$PBB,,, | Performed by: STUDENT IN AN ORGANIZED HEALTH CARE EDUCATION/TRAINING PROGRAM

## 2022-05-23 PROCEDURE — 73562 X-RAY EXAM OF KNEE 3: CPT | Mod: TC,50,PO

## 2022-05-23 PROCEDURE — 73080 XR ELBOW COMPLETE 3 VIEW RIGHT: ICD-10-PCS | Mod: 26,RT,, | Performed by: RADIOLOGY

## 2022-05-23 PROCEDURE — 99999 PR PBB SHADOW E&M-EST. PATIENT-LVL V: ICD-10-PCS | Mod: PBBFAC,,, | Performed by: STUDENT IN AN ORGANIZED HEALTH CARE EDUCATION/TRAINING PROGRAM

## 2022-05-23 PROCEDURE — 73562 X-RAY EXAM OF KNEE 3: CPT | Mod: 26,50,, | Performed by: RADIOLOGY

## 2022-05-23 PROCEDURE — 99213 PR OFFICE/OUTPT VISIT, EST, LEVL III, 20-29 MIN: ICD-10-PCS | Mod: S$PBB,,, | Performed by: STUDENT IN AN ORGANIZED HEALTH CARE EDUCATION/TRAINING PROGRAM

## 2022-05-23 PROCEDURE — 99999 PR PBB SHADOW E&M-EST. PATIENT-LVL V: CPT | Mod: PBBFAC,,, | Performed by: STUDENT IN AN ORGANIZED HEALTH CARE EDUCATION/TRAINING PROGRAM

## 2022-05-23 PROCEDURE — 73080 X-RAY EXAM OF ELBOW: CPT | Mod: TC,PO,RT

## 2022-05-23 PROCEDURE — 99215 OFFICE O/P EST HI 40 MIN: CPT | Mod: PBBFAC,PO | Performed by: STUDENT IN AN ORGANIZED HEALTH CARE EDUCATION/TRAINING PROGRAM

## 2022-05-23 RX ORDER — DICLOFENAC SODIUM 10 MG/G
2 GEL TOPICAL 2 TIMES DAILY PRN
Qty: 200 G | Refills: 2 | Status: SHIPPED | OUTPATIENT
Start: 2022-05-23 | End: 2022-07-13

## 2022-05-23 RX ORDER — PRAVASTATIN SODIUM 20 MG/1
TABLET ORAL
COMMUNITY
Start: 2021-07-12 | End: 2022-07-13

## 2022-05-23 NOTE — PROGRESS NOTES
Problem List Items Addressed This Visit        Orthopedic    Acute pain of both knees - Primary    Overview     Acute/subacute; likely OA  XR bilateral knees  Exercises in am & pm for 2-4 weeks, can apply topical Bengay or Aspercreme. Tylenol as needed for pain; heat or ice therapy              Relevant Orders    X-ray Knee Ortho Bilateral    Right elbow pain    Overview     Chronic pain; unknown injury; likely lateral epicondylitis   R elbow XR  Exercises in am & pm for 2-4 weeks, can apply topical Bengay or Aspercreme. Tylenol as needed for pain; heat or ice therapy              Relevant Orders    X-Ray Elbow Complete Right    Trigger middle finger of right hand    Relevant Orders    Ambulatory referral/consult to Orthopedics              Follow up in about 6 weeks (around 7/4/2022) for w/ PCP/XENIA.    Shauna Boone MD  _________________________________________________________________________      Patient ID: Giles Bravo is a 47 y.o. female.    Chief Complaint:  Bilateral knee and R elbow pain  Reports 1 month bilateral L>R knee pain. Denies known injury/trauma/ reports pain worse with working in the garden. Has tried Bengay/aspercreme with some relief.     Reports R elbow pain for about 8 month while fixing house after JERROD. 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 Heat therapy helps.     Also with R middle finger trigger finger for several months.     Past medical histories reviewed, including past medical, surgical, family and social histories.      Current Outpatient Medications on File Prior to Visit   Medication Sig Dispense Refill    calcium carbonate (CALCIUM 300 ORAL) Take by mouth.      ergocalciferol, vitamin D2, (VITAMIN D ORAL) Take by mouth.      esomeprazole magnesium 20 mg TbEC Take 20 mg by mouth once daily. 30 tablet 2    FENOFIBRATE ORAL fenofibrate Take No date recorded No form recorded No frequency recorded No route recorded No set duration recorded No set  duration amount recorded active No dosage strength recorded No dosage strength units of measure recorded      metFORMIN (GLUCOPHAGE) 1000 MG tablet Take 1 tablet (1,000 mg total) by mouth 2 (two) times daily with meals. 180 tablet 0    multivit-mins no.63/iron/folic (M-VIT ORAL) Take by mouth.      predniSONE (DELTASONE) 5 MG tablet Take 5 mg by mouth once daily.      QUERCETIN DIHYDRATE, BULK, MISC by Misc.(Non-Drug; Combo Route) route.      UNABLE TO FIND 100 mg. ARTEMISININ      zinc 50 mg Tab Take by mouth.      pravastatin (PRAVACHOL) 20 MG tablet        No current facility-administered medications on file prior to visit.       Review of Systems   12 point review of systems negative except for listed in HPI.     Objective:    Nursing note and vitals reviewed.  Vitals:    05/23/22 1350   BP: 126/75   Pulse: 94   Resp: 18   Temp: 98.2 °F (36.8 °C)     Body mass index is 22.85 kg/m².     Physical Exam   Constitutional: oriented to person, place, and time. well-developed and well-nourished. No distress.   HENT: WNL  Head: Normocephalic and atraumatic.   Eyes: EOM are normal.   Neck: Normal range of motion. Neck supple.   Cardiovascular: Normal rate  Pulmonary/Chest: Effort normal. No respiratory distress.   Musculoskeletal: Normal range of motion. no edema.     Knee Exam:  Normal gait, no effusion, warmth, erythema  ROM: mildly limited with complete extension and complete flexion  Strength: 5/5 in all planes  ACL, PCL, MCL, LCL wnl  Anterior/posterior drawer sign: negative   Quadriceps tendon: no ttp  Patellar tendon: no ttp  Patella: no ttp, not ballottable   Tibial plateau: + pain to medial joint line (L knee); + pain to lateral joint line (R knee)  Varus/valgus strain: no pain elicited  Neurovascularly intact    R elbow- no edema, erythema, or ecchymosis, decreased extension with mild ttp to lateral epicondyle, full flexion, no pain with rom, 5/5 strength, neurovascularly inact    R middle finger - no  edema, erythema, or ecchymosis. Pain tender nodule overlying the MCP joint. Pain with full extension. neurovascularly intact to bilateral hands    Neurological: CN II-XII intact  Skin: warm and dry.   Psychiatric: normal mood and affect. behavior is normal.           We Offer Telehealth & Same Day Appointments!   Book your Telehealth appointment with me through my nurse or   Clinic appointments on [employer]!  [phone]     To Schedule appointments online, go to [facility]: [email or web address]

## 2022-05-23 NOTE — PATIENT INSTRUCTIONS
Exercises in am & pm for 2-4 weeks, can apply topical Bengay or Aspercreme. Tylenol as needed for pain; heat or ice therapy      Warm

## 2022-05-24 NOTE — PROGRESS NOTES
Results have been reviewed via [employer]. Please verify that these have been viewed by patient. If not, please call patient with results.    I have sent a msg to patient with the following interpretation (see below):    I have received your recent XR knees - nml. No arthritis, normal knee bones/joint.  Continue treatment plan as discussed in clinic.       Please do not hesitate to call or message with any questions or concerns    Shauna Boone MD

## 2022-05-24 NOTE — PROGRESS NOTES
Results have been reviewed via [employer]. Please verify that these have been viewed by patient. If not, please call patient with results.    I have sent a msg to patient with the following interpretation (see below):    I have received your recent elbow XR which shows beginning signs of arthritis. Continue treatment plan as discussed in clinic.     Please do not hesitate to call or message with any questions or concerns    Shauna Boone MD

## 2022-05-27 ENCOUNTER — PATIENT MESSAGE (OUTPATIENT)
Dept: FAMILY MEDICINE | Facility: CLINIC | Age: 47
End: 2022-05-27
Payer: OTHER GOVERNMENT

## 2022-06-08 ENCOUNTER — TELEPHONE (OUTPATIENT)
Dept: FAMILY MEDICINE | Facility: CLINIC | Age: 47
End: 2022-06-08
Payer: OTHER GOVERNMENT

## 2022-06-08 DIAGNOSIS — E11.9 TYPE 2 DIABETES MELLITUS WITHOUT COMPLICATION, WITHOUT LONG-TERM CURRENT USE OF INSULIN: Primary | ICD-10-CM

## 2022-06-08 NOTE — TELEPHONE ENCOUNTER
----- Message from Tia Barcenas sent at 6/8/2022  3:15 PM CDT -----  Contact: pt  Type: Needs Medical Advice    Who Called: pt  Best Call Back Number: [phone]    Inquiry/Question: pt saw Dr Boone and would like the after visit summary mailed to them with the appointments on it. They lost it. I offered the portal and pt declined.          Thank you~

## 2022-06-08 NOTE — TELEPHONE ENCOUNTER
----- Message from Tia Barcenas sent at 6/8/2022  4:04 PM CDT -----  Contact: pt  Type: Needs Lab Orders     Who Called: pt  Best Call Back Number: [phone]    Inquiry/Question: pt states she has an appt with Dr Juarez on 07/13/2022. She would like her lab orders to be put into the system so she can get her lab work done before.       Thank you~

## 2022-06-09 NOTE — TELEPHONE ENCOUNTER
She can get all of those you listed plus a CBC    I have signed for the following orders AND/OR meds.  Please call the patient and ask the patient to schedule the testing AND/OR inform about any medications that were sent. Medications have been sent to pharmacy listed below      Orders Placed This Encounter   Procedures    CBC Without Differential     Standing Status:   Standing     Number of Occurrences:   99     Standing Expiration Date:   8/8/2023              [id number] - [place] [address] AT [facility] OF [facility] [address] [address]  Phone: [phone] Fax: [phone]

## 2022-06-16 ENCOUNTER — TELEPHONE (OUTPATIENT)
Dept: ORTHOPEDICS | Facility: CLINIC | Age: 47
End: 2022-06-16
Payer: OTHER GOVERNMENT

## 2022-07-06 ENCOUNTER — LAB VISIT (OUTPATIENT)
Dept: LAB | Facility: HOSPITAL | Age: 47
End: 2022-07-06
Attending: INTERNAL MEDICINE
Payer: OTHER GOVERNMENT

## 2022-07-06 DIAGNOSIS — E03.9 HYPOTHYROIDISM, UNSPECIFIED TYPE: ICD-10-CM

## 2022-07-06 DIAGNOSIS — E11.9 TYPE 2 DIABETES MELLITUS WITHOUT COMPLICATION, WITHOUT LONG-TERM CURRENT USE OF INSULIN: ICD-10-CM

## 2022-07-06 DIAGNOSIS — E78.2 MIXED HYPERLIPIDEMIA: ICD-10-CM

## 2022-07-06 LAB
CHOLEST SERPL-MCNC: 210 MG/DL (ref 120–199)
CHOLEST/HDLC SERPL: 4.7 {RATIO} (ref 2–5)
ERYTHROCYTE [DISTWIDTH] IN BLOOD BY AUTOMATED COUNT: 12 % (ref 11.5–14.5)
ESTIMATED AVG GLUCOSE: 123 MG/DL (ref 68–131)
HBA1C MFR BLD: 5.9 % (ref 4–5.6)
HCT VFR BLD AUTO: 41.4 % (ref 37–48.5)
HDLC SERPL-MCNC: 45 MG/DL (ref 40–75)
HDLC SERPL: 21.4 % (ref 20–50)
HGB BLD-MCNC: 13.3 G/DL (ref 12–16)
LDLC SERPL CALC-MCNC: 112.4 MG/DL (ref 63–159)
MCH RBC QN AUTO: 29.8 PG (ref 27–31)
MCHC RBC AUTO-ENTMCNC: 32.1 G/DL (ref 32–36)
MCV RBC AUTO: 93 FL (ref 82–98)
NONHDLC SERPL-MCNC: 165 MG/DL
PLATELET # BLD AUTO: 272 K/UL (ref 150–450)
PMV BLD AUTO: 9.9 FL (ref 9.2–12.9)
RBC # BLD AUTO: 4.46 M/UL (ref 4–5.4)
TRIGL SERPL-MCNC: 263 MG/DL (ref 30–150)
TSH SERPL DL<=0.005 MIU/L-ACNC: 2.54 UIU/ML (ref 0.4–4)
WBC # BLD AUTO: 5.65 K/UL (ref 3.9–12.7)

## 2022-07-06 PROCEDURE — 83036 HEMOGLOBIN GLYCOSYLATED A1C: CPT | Performed by: INTERNAL MEDICINE

## 2022-07-06 PROCEDURE — 85027 COMPLETE CBC AUTOMATED: CPT | Performed by: INTERNAL MEDICINE

## 2022-07-06 PROCEDURE — 84443 ASSAY THYROID STIM HORMONE: CPT | Performed by: INTERNAL MEDICINE

## 2022-07-06 PROCEDURE — 80061 LIPID PANEL: CPT | Performed by: INTERNAL MEDICINE

## 2022-07-06 PROCEDURE — 36415 COLL VENOUS BLD VENIPUNCTURE: CPT | Mod: PO | Performed by: INTERNAL MEDICINE

## 2022-07-13 ENCOUNTER — LAB VISIT (OUTPATIENT)
Dept: LAB | Facility: HOSPITAL | Age: 47
End: 2022-07-13
Attending: INTERNAL MEDICINE
Payer: OTHER GOVERNMENT

## 2022-07-13 ENCOUNTER — OFFICE VISIT (OUTPATIENT)
Dept: FAMILY MEDICINE | Facility: CLINIC | Age: 47
End: 2022-07-13
Payer: OTHER GOVERNMENT

## 2022-07-13 VITALS
HEART RATE: 76 BPM | SYSTOLIC BLOOD PRESSURE: 122 MMHG | HEIGHT: 63 IN | WEIGHT: 128 LBS | TEMPERATURE: 98 F | BODY MASS INDEX: 22.68 KG/M2 | DIASTOLIC BLOOD PRESSURE: 71 MMHG

## 2022-07-13 DIAGNOSIS — K13.0 CHEILITIS: ICD-10-CM

## 2022-07-13 DIAGNOSIS — L30.9 DERMATITIS: ICD-10-CM

## 2022-07-13 DIAGNOSIS — Z78.9 STATIN INTOLERANCE: ICD-10-CM

## 2022-07-13 DIAGNOSIS — E11.9 TYPE 2 DIABETES MELLITUS WITHOUT COMPLICATION, WITHOUT LONG-TERM CURRENT USE OF INSULIN: ICD-10-CM

## 2022-07-13 DIAGNOSIS — M65.332 TRIGGER MIDDLE FINGER OF LEFT HAND: ICD-10-CM

## 2022-07-13 DIAGNOSIS — N05.0 MINIMAL CHANGE DISEASE: ICD-10-CM

## 2022-07-13 DIAGNOSIS — E78.2 MIXED HYPERLIPIDEMIA: ICD-10-CM

## 2022-07-13 DIAGNOSIS — E03.4 HYPOTHYROIDISM DUE TO ACQUIRED ATROPHY OF THYROID: ICD-10-CM

## 2022-07-13 DIAGNOSIS — M65.331 TRIGGER MIDDLE FINGER OF RIGHT HAND: Primary | ICD-10-CM

## 2022-07-13 LAB
ALBUMIN SERPL BCP-MCNC: 4.6 G/DL (ref 3.5–5.2)
ALP SERPL-CCNC: 74 U/L (ref 55–135)
ALT SERPL W/O P-5'-P-CCNC: 16 U/L (ref 10–44)
ANION GAP SERPL CALC-SCNC: 12 MMOL/L (ref 8–16)
AST SERPL-CCNC: 19 U/L (ref 10–40)
BILIRUB SERPL-MCNC: 0.9 MG/DL (ref 0.1–1)
BUN SERPL-MCNC: 13 MG/DL (ref 6–20)
CALCIUM SERPL-MCNC: 10.3 MG/DL (ref 8.7–10.5)
CHLORIDE SERPL-SCNC: 101 MMOL/L (ref 95–110)
CO2 SERPL-SCNC: 27 MMOL/L (ref 23–29)
CREAT SERPL-MCNC: 0.7 MG/DL (ref 0.5–1.4)
EST. GFR  (AFRICAN AMERICAN): >60 ML/MIN/1.73 M^2
EST. GFR  (NON AFRICAN AMERICAN): >60 ML/MIN/1.73 M^2
FOLATE SERPL-MCNC: 17.9 NG/ML (ref 4–24)
GLUCOSE SERPL-MCNC: 136 MG/DL (ref 70–110)
POTASSIUM SERPL-SCNC: 3.9 MMOL/L (ref 3.5–5.1)
PROT SERPL-MCNC: 8.2 G/DL (ref 6–8.4)
SODIUM SERPL-SCNC: 140 MMOL/L (ref 136–145)
VIT B12 SERPL-MCNC: 1074 PG/ML (ref 210–950)

## 2022-07-13 PROCEDURE — 82607 VITAMIN B-12: CPT | Performed by: INTERNAL MEDICINE

## 2022-07-13 PROCEDURE — 80053 COMPREHEN METABOLIC PANEL: CPT | Performed by: INTERNAL MEDICINE

## 2022-07-13 PROCEDURE — 99396 PREV VISIT EST AGE 40-64: CPT | Mod: S$PBB,,, | Performed by: INTERNAL MEDICINE

## 2022-07-13 PROCEDURE — 99214 OFFICE O/P EST MOD 30 MIN: CPT | Mod: PBBFAC,PO | Performed by: INTERNAL MEDICINE

## 2022-07-13 PROCEDURE — 36415 COLL VENOUS BLD VENIPUNCTURE: CPT | Mod: PO | Performed by: INTERNAL MEDICINE

## 2022-07-13 PROCEDURE — 99999 PR PBB SHADOW E&M-EST. PATIENT-LVL IV: CPT | Mod: PBBFAC,,, | Performed by: INTERNAL MEDICINE

## 2022-07-13 PROCEDURE — 99396 PR PREVENTIVE VISIT,EST,40-64: ICD-10-PCS | Mod: S$PBB,,, | Performed by: INTERNAL MEDICINE

## 2022-07-13 PROCEDURE — 82746 ASSAY OF FOLIC ACID SERUM: CPT | Performed by: INTERNAL MEDICINE

## 2022-07-13 PROCEDURE — 99999 PR PBB SHADOW E&M-EST. PATIENT-LVL IV: ICD-10-PCS | Mod: PBBFAC,,, | Performed by: INTERNAL MEDICINE

## 2022-07-13 RX ORDER — METFORMIN HYDROCHLORIDE 1000 MG/1
1000 TABLET ORAL DAILY
COMMUNITY
End: 2022-07-13 | Stop reason: SDUPTHER

## 2022-07-13 RX ORDER — METFORMIN HYDROCHLORIDE 1000 MG/1
1000 TABLET ORAL DAILY
Qty: 90 TABLET | Refills: 1 | Status: SHIPPED | OUTPATIENT
Start: 2022-07-13 | End: 2023-01-06 | Stop reason: SDUPTHER

## 2022-07-13 RX ORDER — CLOTRIMAZOLE AND BETAMETHASONE DIPROPIONATE 10; .5 MG/ML; MG/ML
LOTION TOPICAL 2 TIMES DAILY
Qty: 30 ML | Refills: 0 | Status: SHIPPED | OUTPATIENT
Start: 2022-07-13 | End: 2024-03-20

## 2022-07-13 RX ORDER — DICLOFENAC SODIUM 10 MG/G
2 GEL TOPICAL 2 TIMES DAILY PRN
Qty: 200 G | Refills: 2 | Status: SHIPPED | OUTPATIENT
Start: 2022-07-13 | End: 2023-02-09

## 2022-07-13 NOTE — PROGRESS NOTES
This note is specifically for wellness visit performed today.     WELLNESS EXAM      Patient ID: Giles Bravo is a 47 y.o. female.  has a past medical history of Diabetes mellitus, Fatty liver, GERD (gastroesophageal reflux disease), Helicobacter pylori (H. pylori) infection (5/17/2019), Hepatomegaly, Hypothyroid, Minimal change disease, Pleural effusion on right (10/21/2019), and Severe malnutrition (5/11/2019).     Chief Complaint:  Encounter for wellness exam    Well Adult Physical: Patient here for a comprehensive physical exam.     Diabetes controlled now that she has been compliant with medications.    Urine continues not to show any protein.    Attempted pravastatin last visit but intolerant of that medication as well.    Needs ortho appt for treatment of trigger finger of bilateral 3rd finger.    Also having rash around mouth for several weeks. Dry, scaly. Mild pain. No itching. She has been applying topical OTC abx cream. No other medications. Denies new foods, medicines, lotions, makeup, detergent, etc.    No other new complaints today.      Health Maintenance Topics with due status: Not Due       Topic Last Completion Date    Influenza Vaccine 11/08/2013    TETANUS VACCINE 01/01/2016    Cervical Cancer Screening 01/15/2021    Pneumococcal Vaccines (Age 0-64) 08/05/2021    Colorectal Cancer Screening 12/01/2021    Diabetes Urine Screening 07/06/2022    Lipid Panel 07/06/2022    Hemoglobin A1c 07/06/2022        ==============================================    Health Maintenance Due   Topic Date Due    COVID-19 Vaccine (1) Never done    Eye Exam  Never done    Low Dose Statin  Never done    Mammogram  01/12/2022    Foot Exam  04/12/2022       Past Medical History:  Past Medical History:   Diagnosis Date    Diabetes mellitus     recent diagnosis    Fatty liver     GERD (gastroesophageal reflux disease)     Helicobacter pylori (H. pylori) infection 5/17/2019    Hepatomegaly     Hypothyroid      Minimal change disease     Dr. Panchal    Pleural effusion on right 10/21/2019    Severe malnutrition 5/11/2019     Past Surgical History:   Procedure Laterality Date    COLONOSCOPY N/A 12/1/2021    Procedure: COLONOSCOPY;  Surgeon: Дмитрий Johnson MD;  Location: [facility];  Service: Endoscopy;  Laterality: N/A;    COSMETIC SURGERY      ESOPHAGOGASTRODUODENOSCOPY N/A 5/13/2019    Procedure: EGD (ESOPHAGOGASTRODUODENOSCOPY);  Surgeon: Bola Snyder MD;  Location: [facility];  Service: Endoscopy;  Laterality: N/A; positive for h pylori    ESOPHAGOGASTRODUODENOSCOPY N/A 10/24/2019    Procedure: EGD (ESOPHAGOGASTRODUODENOSCOPY);  Surgeon: Matt Sanchez MD;  Location: [facility];  Service: Endoscopy;  Laterality: N/A; gastritis; biopsy: stomach- MILD CHRONIC INACTIVE GASTRITIS AND INTESTINAL METAPLASIA; erosion; NEGATIVE FOR HELICOBACTER PYLORI; duodenum WNL    ESOPHAGOGASTRODUODENOSCOPY N/A 12/1/2021    Procedure: EGD (ESOPHAGOGASTRODUODENOSCOPY);  Surgeon: Дмитрий Johnson MD;  Location: [facility];  Service: Endoscopy;  Laterality: N/A;    RENAL BIOPSY      May 2019 at [facility], path [facility] to [facility]     Review of patient's allergies indicates:  No Known Allergies  Current Outpatient Medications on File Prior to Visit   Medication Sig Dispense Refill    esomeprazole (NEXIUM) 20 MG capsule TAKE 1 CAPSULE BY MOUTH ONCE DAILY 30 capsule 5    multivit-mins no.63/iron/folic (M-VIT ORAL) Take by mouth.      QUERCETIN DIHYDRATE, BULK, MISC by Misc.(Non-Drug; Combo Route) route.       No current facility-administered medications on file prior to visit.     Social History     Socioeconomic History    Marital status:    Tobacco Use    Smoking status: Never Smoker    Smokeless tobacco: Never Used   Substance and Sexual Activity    Alcohol use: Not Currently    Drug use: Never     Family History   Problem Relation Age of Onset    Cancer Father     Heart disease Father     Cancer Brother      Breast cancer Mother     Kidney disease Neg Hx     Colon cancer Neg Hx     Esophageal cancer Neg Hx     Stomach cancer Neg Hx        Review of Systems   Constitutional: Negative for chills, fever and unexpected weight change.   HENT: Negative for ear pain and sore throat.    Eyes: Negative for redness and visual disturbance.   Respiratory: Negative for cough and shortness of breath.    Cardiovascular: Negative for chest pain and palpitations.   Gastrointestinal: Negative for nausea and vomiting.   Musculoskeletal: Negative for arthralgias and myalgias.   Skin: + rash.   Neurological: Negative for weakness and headaches.         Objective:      Vitals:    07/13/22 1451   BP: 122/71   Pulse: 76   Temp: 98.2 °F (36.8 °C)     Body mass index is 22.67 kg/m².     Physical Exam   Constitutional: Oriented to person, place, and time. Appears well-developed and well-nourished. No distress.   HENT:   Head: Normocephalic and atraumatic.   Eyes: Pupils are equal, round, and reactive to light. EOM are normal.   Neck: Normal range of motion. Neck supple.   Cardiovascular: Normal rate, regular rhythm, normal heart sounds and intact distal pulses.   No murmur heard.  Pulmonary/Chest: Effort normal and breath sounds normal. No respiratory distress. No wheezes.   Musculoskeletal: + trigger finger bilateral 3rd finger. Exhibits no edema.   Neurological: Alert and oriented to person, place, and time. No cranial nerve deficit.   Skin: Skin is warm and dry. Capillary refill takes less than 2 seconds. Erythema and scaling around mouth.  Psychiatric: Normal mood and affect. Behavior is normal.   Nursing note and vitals reviewed.      All labs, imaging and procedures performed since last visit have been personally reviewed.    Assessment / Plan:      Patient here for annual wellness exam. Health maintenance was reviewed and ordered.    Complete history and physical was completed today.  Complete and thorough medication  reconciliation was performed.  Discussed risks and benefits of medications.  Advised patient on orders and health maintenance.  We discussed old records and old labs if available.  Will request any records not available through epic.  Continue current medications listed on your summary sheet.    All questions were answered. Patient had no further concerns. Advised of diagnoses and plan. Follow up as planned or return sooner if symptoms persist or worsen.     Problem List Items Addressed This Visit        Cardiac/Vascular    Mixed hyperlipidemia    Overview     -chronic condition. Currently stable.    -intolerant of statins  -most recent labs listed below:  Lab Results   Component Value Date    CHOL 210 (H) 07/06/2022     Lab Results   Component Value Date    HDL 45 07/06/2022     Lab Results   Component Value Date    LDLCALC 112.4 07/06/2022     Lab Results   Component Value Date    TRIG 263 (H) 07/06/2022     Lab Results   Component Value Date    ALT 16 07/13/2022    AST 19 07/13/2022    ALKPHOS 74 07/13/2022    BILITOT 0.9 07/13/2022                 Renal/    Minimal change disease    Overview     -followedby nephrology  -no longer on prednisone or rituxan   -renal function stable/wnl; protein/creatinine wnl              Endocrine    Hypothyroidism    Overview     Lab Results   Component Value Date    TSH 2.537 07/06/2022   -no longer thyroid replacement             Type 2 diabetes mellitus without complication, without long-term current use of insulin    Overview     Diabetes Medications             metFORMIN (GLUCOPHAGE) 1000 MG tablet Take 1 tablet (1,000 mg total) by mouth once daily at 6am.        -condition is currently controlled  -plan to continue current medications  -see diabetic health maintenance listed below  -on statin: No-intolerant  -on ACE-I/ARB: No  -counseling provided on importance of diabetic diet and medication compliance in order to treat diabetes  -discussed diabetes disease course and  potential complications           Relevant Medications    metFORMIN (GLUCOPHAGE) 1000 MG tablet    Other Relevant Orders    Comprehensive Metabolic Panel (Completed)       Orthopedic    Trigger middle finger of right hand - Primary    Relevant Orders    Ambulatory referral/consult to Orthopedics       Other    Statin intolerance      Other Visit Diagnoses     Trigger middle finger of left hand        Relevant Orders    Ambulatory referral/consult to Orthopedics    Dermatitis        Relevant Medications    clotrimazole-betamethasone (LOTRISONE) lotion    Cheilitis        Relevant Orders    Vitamin B12 (Completed)    Folate (Completed)          Follow up in about 6 months (around 1/13/2023), or if symptoms worsen or fail to improve, for F/U with me; labs today: cmp,b12,folate; MMG; orhto appt.    Ginna Juarez MD

## 2022-07-15 PROBLEM — M25.562 ACUTE PAIN OF BOTH KNEES: Status: RESOLVED | Noted: 2022-05-23 | Resolved: 2022-07-15

## 2022-07-15 PROBLEM — Z78.9 STATIN INTOLERANCE: Status: ACTIVE | Noted: 2022-07-15

## 2022-07-15 PROBLEM — M25.561 ACUTE PAIN OF BOTH KNEES: Status: RESOLVED | Noted: 2022-05-23 | Resolved: 2022-07-15

## 2022-07-15 PROBLEM — E43 SEVERE MALNUTRITION: Status: RESOLVED | Noted: 2019-05-11 | Resolved: 2022-07-15

## 2022-07-15 PROBLEM — M25.521 RIGHT ELBOW PAIN: Status: RESOLVED | Noted: 2022-05-23 | Resolved: 2022-07-15

## 2022-07-15 NOTE — PROGRESS NOTES
Results have been released via [employer]. Please verify that these have been viewed by patient. If not, please call patient with results.    I have sent a message to them with the following interpretation (see below).      I have reviewed your recent blood work.     Your metabolic panel which shows your electrolytes, glucose, kidney and liver function is within normal limits.    B12 and folate are normal.    Please do not hesitate to call or message with any additional questions or concerns.    Ginna Juarez MD

## 2022-07-20 DIAGNOSIS — R52 PAIN: Primary | ICD-10-CM

## 2022-07-22 ENCOUNTER — HOSPITAL ENCOUNTER (OUTPATIENT)
Dept: RADIOLOGY | Facility: OTHER | Age: 47
Discharge: HOME | End: 2022-07-22
Attending: PHYSICIAN ASSISTANT
Payer: OTHER GOVERNMENT

## 2022-07-22 ENCOUNTER — OFFICE VISIT (OUTPATIENT)
Dept: ORTHOPEDICS | Facility: CLINIC | Age: 47
End: 2022-07-22
Payer: OTHER GOVERNMENT

## 2022-07-22 VITALS
HEART RATE: 66 BPM | SYSTOLIC BLOOD PRESSURE: 125 MMHG | BODY MASS INDEX: 22.69 KG/M2 | DIASTOLIC BLOOD PRESSURE: 78 MMHG | WEIGHT: 128.06 LBS | HEIGHT: 63 IN

## 2022-07-22 DIAGNOSIS — M65.331 TRIGGER MIDDLE FINGER OF RIGHT HAND: ICD-10-CM

## 2022-07-22 DIAGNOSIS — M65.332 TRIGGER MIDDLE FINGER OF LEFT HAND: ICD-10-CM

## 2022-07-22 DIAGNOSIS — R52 PAIN: ICD-10-CM

## 2022-07-22 PROCEDURE — 99213 OFFICE O/P EST LOW 20 MIN: CPT | Mod: PBBFAC | Performed by: PHYSICIAN ASSISTANT

## 2022-07-22 PROCEDURE — 99999 PR PBB SHADOW E&M-EST. PATIENT-LVL III: ICD-10-PCS | Mod: PBBFAC,,, | Performed by: PHYSICIAN ASSISTANT

## 2022-07-22 PROCEDURE — 20550 NJX 1 TENDON SHEATH/LIGAMENT: CPT | Mod: PBBFAC | Performed by: PHYSICIAN ASSISTANT

## 2022-07-22 PROCEDURE — 99204 OFFICE O/P NEW MOD 45 MIN: CPT | Mod: 25,S$PBB,, | Performed by: PHYSICIAN ASSISTANT

## 2022-07-22 PROCEDURE — 20550 PR INJECT TENDON SHEATH/LIGAMENT: ICD-10-PCS | Mod: S$PBB,50,, | Performed by: PHYSICIAN ASSISTANT

## 2022-07-22 PROCEDURE — 76942 ECHO GUIDE FOR BIOPSY: CPT | Mod: PBBFAC | Performed by: PHYSICIAN ASSISTANT

## 2022-07-22 PROCEDURE — 73130 XR HAND COMPLETE 3 VIEWS BILATERAL: ICD-10-PCS | Mod: 26,50,, | Performed by: RADIOLOGY

## 2022-07-22 PROCEDURE — 99204 PR OFFICE/OUTPT VISIT, NEW, LEVL IV, 45-59 MIN: ICD-10-PCS | Mod: 25,S$PBB,, | Performed by: PHYSICIAN ASSISTANT

## 2022-07-22 PROCEDURE — 20550 NJX 1 TENDON SHEATH/LIGAMENT: CPT | Mod: S$PBB,50,, | Performed by: PHYSICIAN ASSISTANT

## 2022-07-22 PROCEDURE — 99999 PR PBB SHADOW E&M-EST. PATIENT-LVL III: CPT | Mod: PBBFAC,,, | Performed by: PHYSICIAN ASSISTANT

## 2022-07-22 PROCEDURE — 73130 X-RAY EXAM OF HAND: CPT | Mod: 26,50,, | Performed by: RADIOLOGY

## 2022-07-22 PROCEDURE — 73130 X-RAY EXAM OF HAND: CPT | Mod: TC,50,FY

## 2022-07-22 RX ORDER — LIDOCAINE HYDROCHLORIDE 10 MG/ML
0.5 INJECTION, SOLUTION EPIDURAL; INFILTRATION; INTRACAUDAL; PERINEURAL ONCE
Status: COMPLETED | OUTPATIENT
Start: 2022-07-22 | End: 2022-07-22

## 2022-07-22 RX ORDER — DEXAMETHASONE SODIUM PHOSPHATE 4 MG/ML
4 INJECTION, SOLUTION INTRA-ARTICULAR; INTRALESIONAL; INTRAMUSCULAR; INTRAVENOUS; SOFT TISSUE
Status: COMPLETED | OUTPATIENT
Start: 2022-07-22 | End: 2022-07-22

## 2022-07-22 RX ADMIN — LIDOCAINE HYDROCHLORIDE 5 MG: 10 INJECTION, SOLUTION EPIDURAL; INFILTRATION; INTRACAUDAL; PERINEURAL at 10:07

## 2022-07-22 RX ADMIN — DEXAMETHASONE SODIUM PHOSPHATE 4 MG: 4 INJECTION, SOLUTION INTRA-ARTICULAR; INTRALESIONAL; INTRAMUSCULAR; INTRAVENOUS; SOFT TISSUE at 09:07

## 2022-07-22 NOTE — PROGRESS NOTES
Subjective:      Patient ID: Giles Bravo is a 47 y.o. female.    Chief Complaint: Pain of the Right Hand      HPI  Giles Bravo is a 47 y.o. female presenting today for evaluation of the bilateral hands. She reports pain and triggering in the bilateral long fingers, R>L. Onset about one month ago. She has difficulty with full flexion of the right long finger secondary to pain/ triggering. Denies numbness. She has tried topical analgesics without significant relief.   An  was used for some portions of this visit.       Review of patient's allergies indicates:  No Known Allergies      Current Outpatient Medications   Medication Sig Dispense Refill    clotrimazole-betamethasone (LOTRISONE) lotion Apply topically 2 (two) times daily. 30 mL 0    diclofenac sodium (VOLTAREN) 1 % Gel Apply 2 g topically 2 (two) times daily as needed (for arthritis pain). 200 g 2    esomeprazole (NEXIUM) 20 MG capsule TAKE 1 CAPSULE BY MOUTH ONCE DAILY 30 capsule 5    metFORMIN (GLUCOPHAGE) 1000 MG tablet Take 1 tablet (1,000 mg total) by mouth once daily at 6am. 90 tablet 1    multivit-mins no.63/iron/folic (M-VIT ORAL) Take by mouth.      QUERCETIN DIHYDRATE, BULK, MISC by Misc.(Non-Drug; Combo Route) route.       Current Facility-Administered Medications   Medication Dose Route Frequency Provider Last Rate Last Admin    dexamethasone injection 4 mg  4 mg Other 1 time in Clinic/HOD Kathy Holloway PA-C        dexamethasone injection 4 mg  4 mg Other 1 time in Clinic/HOD Kathy Holloway PA-C        LIDOcaine (PF) 10 mg/ml (1%) injection 5 mg  0.5 mL Other Once Kathy Holloway PA-C        LIDOcaine (PF) 10 mg/ml (1%) injection 5 mg  0.5 mL Other Once Kathy Holloway PA-C           Past Medical History:   Diagnosis Date    Diabetes mellitus     recent diagnosis    Fatty liver     GERD (gastroesophageal reflux disease)     Helicobacter pylori (H. pylori) infection 5/17/2019    Hepatomegaly     Hypothyroid  "    Minimal change disease     Maira Panchal    Pleural effusion on right 10/21/2019    Severe malnutrition 5/11/2019       Past Surgical History:   Procedure Laterality Date    COLONOSCOPY N/A 12/1/2021    Procedure: COLONOSCOPY;  Surgeon: Дмитрий Johnson MD;  Location: [facility];  Service: Endoscopy;  Laterality: N/A;    COSMETIC SURGERY      ESOPHAGOGASTRODUODENOSCOPY N/A 5/13/2019    Procedure: EGD (ESOPHAGOGASTRODUODENOSCOPY);  Surgeon: Bola Snyder MD;  Location: [facility];  Service: Endoscopy;  Laterality: N/A; positive for h pylori    ESOPHAGOGASTRODUODENOSCOPY N/A 10/24/2019    Procedure: EGD (ESOPHAGOGASTRODUODENOSCOPY);  Surgeon: Matt Sanchez MD;  Location: [facility];  Service: Endoscopy;  Laterality: N/A; gastritis; biopsy: stomach- MILD CHRONIC INACTIVE GASTRITIS AND INTESTINAL METAPLASIA; erosion; NEGATIVE FOR HELICOBACTER PYLORI; duodenum WNL    ESOPHAGOGASTRODUODENOSCOPY N/A 12/1/2021    Procedure: EGD (ESOPHAGOGASTRODUODENOSCOPY);  Surgeon: Дмитрий Johnson MD;  Location: [facility];  Service: Endoscopy;  Laterality: N/A;    RENAL BIOPSY      May 2019 at [facility], path sent to [facility]       Review of Systems:  Constitutional: Negative for chills and fever.   Respiratory: Negative for cough and shortness of breath.    Gastrointestinal: Negative for nausea and vomiting.   Skin: Negative for rash.   Neurological: Negative for dizziness and headaches.   Psychiatric/Behavioral: Negative for depression.   MSK as in HPI       OBJECTIVE:     PHYSICAL EXAM:  /78   Pulse 66   Ht 5' 3" (1.6 m)   Wt 58.1 kg (128 lb 1.4 oz)   BMI 22.69 kg/m²     GEN:  NAD, well-developed, well-groomed.  NEURO: Awake, alert, and oriented. Normal attention and concentration.    PSYCH: Normal mood and affect. Behavior is normal.  HEENT: No cervical lymphadenopathy noted.  CARDIOVASCULAR: Radial pulses 2+ bilaterally. No LE edema noted.  PULMONARY: Breath sounds normal. No respiratory " distress.  SKIN: Intact, no rashes.      MSK:   BUE:  Good active ROM of the wrist and fingers. There is triggering noted of the bilateral long fingers. She is ttp at the bilateral long A1 pulleys. AIN/PIN/Radial/Median/Ulnar Nerves assessed in isolation without deficit. Radial & Ulnar arteries palpated 2+. Capillary Refill <3s.      RADIOGRAPHS:  Xray bl hands 7/22/22   Impression:     No evidence for acute fracture, bone destruction, or dislocation.     Mild osteoarthritic type changes within the small joints of the hands and wrist.  No bony erosions are evident.    Comments: I have personally reviewed the imaging and I agree with the above radiologist's report.    ASSESSMENT/PLAN:       ICD-10-CM ICD-9-CM   1. Trigger middle finger of right hand  M65.331 727.03   2. Trigger middle finger of left hand  M65.332 727.03       Orders Placed This Encounter    LIDOcaine (PF) 10 mg/ml (1%) injection 5 mg    dexamethasone injection 4 mg    LIDOcaine (PF) 10 mg/ml (1%) injection 5 mg    dexamethasone injection 4 mg     Plan:   Treatment options discussed. Plan for bilateral long trigger finger injections.   RTC 6 wks       PROCEDURE:  I have explained the risks, benefits, and alternatives of the procedure in detail.  The patient voices understanding and all questions have been answered. US used. The patient agrees to proceed as planned. So after a sterile prep of the skin in the normal fashion the bilateral long flexor tendon sheath is injected from the volar approach using a 25 gauge needle with a combination of 0.5cc 1% plain lidocaine and 4 mg of dexamethasone.  The patient is cautioned and immediate relief of pain is secondary to the local anesthetic and will be temporary.  After the anesthetic wears off there may be a increase in pain that may last for a few hours or a few days and they should use ice to help alleviate this flair up of pain. Patient tolerated the procedure well.       The patient indicates  understanding of these issues and agrees to the plan.    Kathy Holloway PA-C  [facility] [place]

## 2022-07-26 ENCOUNTER — TELEPHONE (OUTPATIENT)
Dept: ADMINISTRATIVE | Facility: HOSPITAL | Age: 47
End: 2022-07-26
Payer: OTHER GOVERNMENT

## 2022-07-27 ENCOUNTER — HOSPITAL ENCOUNTER (OUTPATIENT)
Dept: RADIOLOGY | Facility: HOSPITAL | Age: 47
Discharge: HOME | End: 2022-07-27
Attending: INTERNAL MEDICINE
Payer: OTHER GOVERNMENT

## 2022-07-27 VITALS — BODY MASS INDEX: 22.68 KG/M2 | HEIGHT: 63 IN | WEIGHT: 128 LBS

## 2022-07-27 DIAGNOSIS — Z12.31 OTHER SCREENING MAMMOGRAM: ICD-10-CM

## 2022-07-27 PROCEDURE — 77067 SCR MAMMO BI INCL CAD: CPT | Mod: 26,,, | Performed by: RADIOLOGY

## 2022-07-27 PROCEDURE — 77067 SCR MAMMO BI INCL CAD: CPT | Mod: TC,PO

## 2022-07-27 PROCEDURE — 77063 BREAST TOMOSYNTHESIS BI: CPT | Mod: TC,PO

## 2022-07-27 PROCEDURE — 77063 BREAST TOMOSYNTHESIS BI: CPT | Mod: 26,,, | Performed by: RADIOLOGY

## 2022-07-27 PROCEDURE — 77067 MAMMO DIGITAL SCREENING BILAT WITH TOMO: ICD-10-PCS | Mod: 26,,, | Performed by: RADIOLOGY

## 2022-07-27 PROCEDURE — 77063 MAMMO DIGITAL SCREENING BILAT WITH TOMO: ICD-10-PCS | Mod: 26,,, | Performed by: RADIOLOGY

## 2022-08-02 ENCOUNTER — HOSPITAL ENCOUNTER (OUTPATIENT)
Dept: RADIOLOGY | Facility: HOSPITAL | Age: 47
Discharge: HOME | End: 2022-08-02
Attending: INTERNAL MEDICINE
Payer: OTHER GOVERNMENT

## 2022-08-02 DIAGNOSIS — R92.8 ABNORMAL MAMMOGRAM: ICD-10-CM

## 2022-08-02 PROCEDURE — 76642 US BREAST LEFT LIMITED: ICD-10-PCS | Mod: 26,LT,, | Performed by: RADIOLOGY

## 2022-08-02 PROCEDURE — 76642 ULTRASOUND BREAST LIMITED: CPT | Mod: TC,PO,LT

## 2022-08-02 PROCEDURE — 77065 DX MAMMO INCL CAD UNI: CPT | Mod: 26,LT,, | Performed by: RADIOLOGY

## 2022-08-02 PROCEDURE — 77065 MAMMO DIGITAL DIAGNOSTIC LEFT WITH TOMO: ICD-10-PCS | Mod: 26,LT,, | Performed by: RADIOLOGY

## 2022-08-02 PROCEDURE — 76642 ULTRASOUND BREAST LIMITED: CPT | Mod: 26,LT,, | Performed by: RADIOLOGY

## 2022-08-02 PROCEDURE — 77061 BREAST TOMOSYNTHESIS UNI: CPT | Mod: 26,LT,, | Performed by: RADIOLOGY

## 2022-08-02 PROCEDURE — 77061 MAMMO DIGITAL DIAGNOSTIC LEFT WITH TOMO: ICD-10-PCS | Mod: 26,LT,, | Performed by: RADIOLOGY

## 2022-08-02 PROCEDURE — 77065 DX MAMMO INCL CAD UNI: CPT | Mod: TC,PO,LT

## 2022-08-12 ENCOUNTER — HOSPITAL ENCOUNTER (OUTPATIENT)
Dept: RADIOLOGY | Facility: HOSPITAL | Age: 47
Discharge: HOME | End: 2022-08-12
Attending: INTERNAL MEDICINE
Payer: OTHER GOVERNMENT

## 2022-08-12 ENCOUNTER — TELEPHONE (OUTPATIENT)
Dept: RADIOLOGY | Facility: HOSPITAL | Age: 47
End: 2022-08-12
Payer: OTHER GOVERNMENT

## 2022-08-12 DIAGNOSIS — R92.8 ABNORMAL MAMMOGRAM OF LEFT BREAST: ICD-10-CM

## 2022-08-12 PROCEDURE — 77065 MAMMO DIGITAL DIAGNOSTIC LEFT: ICD-10-PCS | Mod: 26,LT,, | Performed by: RADIOLOGY

## 2022-08-12 PROCEDURE — 88305 TISSUE EXAM BY PATHOLOGIST: ICD-10-PCS | Mod: 26,,, | Performed by: STUDENT IN AN ORGANIZED HEALTH CARE EDUCATION/TRAINING PROGRAM

## 2022-08-12 PROCEDURE — A4648 IMPLANTABLE TISSUE MARKER: HCPCS | Mod: PO

## 2022-08-12 PROCEDURE — 19083 US BREAST BIOPSY WITH IMAGING 1ST SITE LEFT: ICD-10-PCS | Mod: LT,,, | Performed by: RADIOLOGY

## 2022-08-12 PROCEDURE — 88305 TISSUE EXAM BY PATHOLOGIST: CPT | Performed by: STUDENT IN AN ORGANIZED HEALTH CARE EDUCATION/TRAINING PROGRAM

## 2022-08-12 PROCEDURE — 88305 TISSUE EXAM BY PATHOLOGIST: CPT | Mod: 26,,, | Performed by: STUDENT IN AN ORGANIZED HEALTH CARE EDUCATION/TRAINING PROGRAM

## 2022-08-12 PROCEDURE — 25000003 PHARM REV CODE 250: Mod: PO | Performed by: INTERNAL MEDICINE

## 2022-08-12 PROCEDURE — 19083 BX BREAST 1ST LESION US IMAG: CPT | Mod: LT,,, | Performed by: RADIOLOGY

## 2022-08-12 PROCEDURE — 77065 DX MAMMO INCL CAD UNI: CPT | Mod: TC,PO,LT

## 2022-08-12 PROCEDURE — 77065 DX MAMMO INCL CAD UNI: CPT | Mod: 26,LT,, | Performed by: RADIOLOGY

## 2022-08-12 RX ORDER — SODIUM BICARBONATE 42 MG/ML
2.5 INJECTION, SOLUTION INTRAVENOUS ONCE
Status: COMPLETED | OUTPATIENT
Start: 2022-08-12 | End: 2022-08-12

## 2022-08-12 RX ORDER — LIDOCAINE HYDROCHLORIDE 10 MG/ML
5 INJECTION INFILTRATION; PERINEURAL ONCE
Status: COMPLETED | OUTPATIENT
Start: 2022-08-12 | End: 2022-08-12

## 2022-08-12 RX ADMIN — SODIUM BICARBONATE 1 ML: 42 INJECTION, SOLUTION INTRAVENOUS at 10:08

## 2022-08-12 RX ADMIN — LIDOCAINE HYDROCHLORIDE ANHYDROUS 5 ML: 10 INJECTION, SOLUTION INFILTRATION at 10:08

## 2022-08-12 NOTE — TELEPHONE ENCOUNTER
LATE ENTRY FOR 8/10/22 - Called patient regarding orders for left u/s guided breast biopsy, spoke with spouse but could hear patient in background.  Scheduled biopsy on 8/12/22 at 10:15 am at [address] [facility] [address] radiology  Discussed procedure with also as well as location, prep.    Dr. Juarez notified

## 2022-08-16 ENCOUNTER — TELEPHONE (OUTPATIENT)
Dept: RADIOLOGY | Facility: HOSPITAL | Age: 47
End: 2022-08-16
Payer: OTHER GOVERNMENT

## 2022-08-16 LAB
COMMENT: NORMAL
FINAL PATHOLOGIC DIAGNOSIS: NORMAL
GROSS: NORMAL
Lab: NORMAL
MICROSCOPIC EXAM: NORMAL

## 2022-08-16 NOTE — TELEPHONE ENCOUNTER
Called patient with NEGative pathology results:  Final Pathologic Diagnosis Breast, left, 4 o'clock, 4 cm from nipple, ultrasound-guided core needle   biopsies of mass:   - Fibroadenoma      Will schedule 6 month follow up imaging and mail to patient.  Patient voiced understanding and much appreciation

## 2022-08-17 ENCOUNTER — TELEPHONE (OUTPATIENT)
Dept: NEPHROLOGY | Facility: CLINIC | Age: 47
End: 2022-08-17
Payer: OTHER GOVERNMENT

## 2022-08-19 NOTE — PROGRESS NOTES
Results have been released via [employer]. Please verify that these have been viewed by patient. If not, please call patient with results.    I have sent a message to them with the following interpretation (see below).    I have reviewed your recent results from the biopsy.    Biopsy shows that tissue is a fibroadenoma. No evidence of cancer or cancerous changes. This is good news.    Please do not hesitate to call or message with any additional questions or concerns.    Ginna Juarez MD

## 2022-08-23 ENCOUNTER — TELEPHONE (OUTPATIENT)
Dept: NEPHROLOGY | Facility: CLINIC | Age: 47
End: 2022-08-23
Payer: OTHER GOVERNMENT

## 2022-08-23 NOTE — TELEPHONE ENCOUNTER
----- Message from Luis Alfredo Poon sent at 8/23/2022 10:41 AM CDT -----  Type:  Sooner Appointment Request    Caller is requesting a sooner appointment.  Caller declined first available appointment listed below.  Caller will not accept being placed on the waitlist and is requesting a message be sent to doctor.    Name of Caller:  patient's   When is the first available appointment?  October 28, 2022  Symptoms:    Best Call Back Number:  [phone]   Additional Information:  patient had to reschedule appt and the dates were too far out. She still rescheduled, but would like to be seen earlier.

## 2022-08-23 NOTE — TELEPHONE ENCOUNTER
Patient had a 6 month follow up in August but canceled. I am sending this to Eugenia for scheduling for a sooner appointment.

## 2022-09-08 ENCOUNTER — LAB VISIT (OUTPATIENT)
Dept: LAB | Facility: HOSPITAL | Age: 47
End: 2022-09-08
Attending: INTERNAL MEDICINE
Payer: OTHER GOVERNMENT

## 2022-09-08 DIAGNOSIS — N05.0 MINIMAL CHANGE DISEASE: ICD-10-CM

## 2022-09-08 LAB
CREAT UR-MCNC: 51 MG/DL (ref 15–325)
PROT UR-MCNC: <7 MG/DL (ref 0–15)
PROT/CREAT UR: NORMAL MG/G{CREAT} (ref 0–0.2)

## 2022-09-08 PROCEDURE — 82570 ASSAY OF URINE CREATININE: CPT | Performed by: INTERNAL MEDICINE

## 2022-09-09 ENCOUNTER — TELEPHONE (OUTPATIENT)
Dept: NEPHROLOGY | Facility: CLINIC | Age: 47
End: 2022-09-09
Payer: OTHER GOVERNMENT

## 2022-09-09 NOTE — TELEPHONE ENCOUNTER
Patient called to get an appointment with . Eugenia reviewed patients labs and they looked really good. Is it okay to schedule follow up in February?

## 2022-09-09 NOTE — TELEPHONE ENCOUNTER
----- Message from Deidre Gonzalez sent at 9/9/2022  2:36 PM CDT -----  Contact: Pt  at [phone]  Type: Needs Medical Advice  Who Called:  Facundo Llanes Call Back Number: [phone]  Additional Information: Pt reschedule her appt because she had to  reschedule be with her  and to see if her labs will still be good in December. Please call back to Advise.

## 2022-09-19 ENCOUNTER — PATIENT OUTREACH (OUTPATIENT)
Dept: ADMINISTRATIVE | Facility: HOSPITAL | Age: 47
End: 2022-09-19
Payer: OTHER GOVERNMENT

## 2022-09-19 NOTE — PROGRESS NOTES
Working eye exam report; Called and spoke with patient who states that her PCP is watching over her. She had a lot of trouble understanding what I was asking her.

## 2022-12-20 DIAGNOSIS — Z12.31 OTHER SCREENING MAMMOGRAM: ICD-10-CM

## 2022-12-27 ENCOUNTER — TELEPHONE (OUTPATIENT)
Dept: NEPHROLOGY | Facility: CLINIC | Age: 47
End: 2022-12-27
Payer: OTHER GOVERNMENT

## 2022-12-27 DIAGNOSIS — N05.0 MINIMAL CHANGE DISEASE: Primary | ICD-10-CM

## 2022-12-27 NOTE — TELEPHONE ENCOUNTER
Patient has flu.     C/o kidney pain    Taking nyquil    Agreed to send message to her PCP and Dr Newberry.     [facility] so she and her  Facundo can read together.

## 2022-12-27 NOTE — TELEPHONE ENCOUNTER
Spoke with patient and Facundo.     Applied lidocaine patch and she said it doesn't hurt.  They want to get the lab on Friday for good measure. Will mask.     Will keep follow up with Dr Juarez in mid-January and will contact us or Dr Juarez with increased concern before then.

## 2022-12-27 NOTE — TELEPHONE ENCOUNTER
----- Message from Aye Blanchard sent at 12/27/2022  8:23 AM CST -----  Regarding: needs sooner appt  Contact: Facundo   Type:  Same Day Appointment Request    Caller is requesting a same day appointment.  Caller declined first available appointment listed below.      Name of Caller:  Facundo /   When is the first available appointment?  Her appt scheduled in February  Symptoms:  Pt is experiencing kidney pain  Best Call Back Number:  [phone]    Additional Information:    called stating that his wife woke up this morning saying her kidneys were hurting, she would really like to come see Dr Newberry today or as soon as possible regarding this because he has been treating her regarding this. If someone from the office could reach out to schedule or advise what they should do they would really appreciate it

## 2022-12-27 NOTE — TELEPHONE ENCOUNTER
----- Message from [facility] sent at 12/27/2022  8:06 AM CST -----  .Type:  Same Day Appointment Request     Caller is requesting a same day appointment      Caller declined first available appointment NONE FOUND       Best Call Back Number: [phone]    Additional Information: NONE

## 2022-12-29 ENCOUNTER — TELEPHONE (OUTPATIENT)
Dept: FAMILY MEDICINE | Facility: CLINIC | Age: 47
End: 2022-12-29
Payer: OTHER GOVERNMENT

## 2022-12-29 NOTE — TELEPHONE ENCOUNTER
----- Message from Pattie Jeff sent at 12/29/2022  3:45 PM CST -----  Contact: sony/  .Type:  Patient Returning Call    Who Called:Sony/  Who Left Message for Patient:nurse  Does the patient know what this is regarding?:yes  Would the patient rather a call back or a response via [facility]? Call back  Best Call Back Number:[phone]  Additional Information: na        Thanks  CLAYTON

## 2022-12-29 NOTE — TELEPHONE ENCOUNTER
Spoke with patient's , advised that Dr. Juarez will not be able to prescribe Ivermectin for Covid 19. Patient tested positive today and is only experiencing a scratchy throat. He would like to know if patient would qualify to receive Paxlovid if that could be sent in to pharmacy. Please advise.

## 2022-12-29 NOTE — TELEPHONE ENCOUNTER
----- Message from Rubi Mera sent at 12/29/2022  9:53 AM CST -----  Contact: Pt's spouse/ Facundo @ [phone]  Type:  Needs Medical Advice    Who Called: Pt's spouse/ Facundo @ [phone]  Symptoms (please be specific): COVID   How long has patient had these symptoms:  3 days since 12/26/2022.   Pharmacy name and phone #:    [id number] - [facility] [address] AT [facility] [address]  [address] [facility] [address]  Phone: [phone] Fax: [phone]     Would the patient rather a call back or a response via [facility]? call  Best Call Back Number: [phone] or [phone]  Additional Information: Pt went to [facility] in [place] this morning 12/29/2022,  and tested positive for Covid. Pt would like Ivermectin, and an antibiotic to be called in to her pharmacy. Pt was given a prescription for [place] [facility] at the Urgent Care. Please call pt to advise.

## 2023-01-06 DIAGNOSIS — E11.9 TYPE 2 DIABETES MELLITUS WITHOUT COMPLICATION, WITHOUT LONG-TERM CURRENT USE OF INSULIN: ICD-10-CM

## 2023-01-06 NOTE — TELEPHONE ENCOUNTER
----- Message from Marry Welsh sent at 1/6/2023  3:35 PM CST -----  Contact: Patient  Type: Refill request         Who called: Patient         What is the request in detail: needs additional refills for metFORMIN (GLUCOPHAGE) 1000 MG tablet; please advise           Best call back number: [phone]         Additional Information:   [id number] - [place] [address] AT [facility] OF [facility] [address] [address]  Phone: [phone] Fax: [phone]             Thank You

## 2023-01-06 NOTE — TELEPHONE ENCOUNTER
Care Due:                  Date            Visit Type   Department     Provider  --------------------------------------------------------------------------------                                EP -                              PRIMARY      [facility] FAM[facility]  Last Visit: 07-      CARE ([facility])   JULIANNE Juarez                               -                              PRIMARY      [facility]  Next Visit: 02-      CARE ([facility])   MEDICINE       Ginna Juarez                                                            Last  Test          Frequency    Reason                     Performed    Due Date  --------------------------------------------------------------------------------    HBA1C.......  6 months...  metFORMIN................  07- 01-    [facility] Embedded Care Gaps. Reference number: [id number]. 1/06/2023   3:43:26 PM CST

## 2023-01-06 NOTE — TELEPHONE ENCOUNTER
Refill Routing Note   Medication(s) are not appropriate for processing by [facility] for the following reason(s):      - Required laboratory values are outdated    [facility] action(s):  Defer Medication-related problems identified: Requires labs     Medication Therapy Plan: a1c  Medication reconciliation completed: No     Appointments  past 12m or future 3m with PCP    Date Provider   Last Visit   7/13/2022 Ginna Juarez MD   Next Visit   2/9/2023 Ginna Juarez MD   ED visits in past 90 days: 0        Note composed:5:02 PM 01/06/2023

## 2023-01-08 RX ORDER — METFORMIN HYDROCHLORIDE 1000 MG/1
1000 TABLET ORAL
Qty: 90 TABLET | Refills: 1 | Status: SHIPPED | OUTPATIENT
Start: 2023-01-08 | End: 2024-02-19

## 2023-01-12 ENCOUNTER — PATIENT OUTREACH (OUTPATIENT)
Dept: ADMINISTRATIVE | Facility: HOSPITAL | Age: 48
End: 2023-01-12
Payer: OTHER GOVERNMENT

## 2023-01-25 ENCOUNTER — PATIENT MESSAGE (OUTPATIENT)
Dept: ADMINISTRATIVE | Facility: HOSPITAL | Age: 48
End: 2023-01-25
Payer: OTHER GOVERNMENT

## 2023-02-06 ENCOUNTER — OFFICE VISIT (OUTPATIENT)
Dept: NEPHROLOGY | Facility: CLINIC | Age: 48
End: 2023-02-06
Payer: OTHER GOVERNMENT

## 2023-02-06 VITALS
WEIGHT: 128 LBS | DIASTOLIC BLOOD PRESSURE: 82 MMHG | BODY MASS INDEX: 22.68 KG/M2 | HEIGHT: 63 IN | SYSTOLIC BLOOD PRESSURE: 145 MMHG

## 2023-02-06 DIAGNOSIS — N05.0 MINIMAL CHANGE DISEASE: Primary | ICD-10-CM

## 2023-02-06 DIAGNOSIS — R80.9 NEPHROTIC RANGE PROTEINURIA: ICD-10-CM

## 2023-02-06 PROCEDURE — 99213 OFFICE O/P EST LOW 20 MIN: CPT | Mod: PBBFAC,PN | Performed by: INTERNAL MEDICINE

## 2023-02-06 PROCEDURE — 99999 PR PBB SHADOW E&M-EST. PATIENT-LVL III: CPT | Mod: PBBFAC,,, | Performed by: INTERNAL MEDICINE

## 2023-02-06 PROCEDURE — 99214 PR OFFICE/OUTPT VISIT, EST, LEVL IV, 30-39 MIN: ICD-10-PCS | Mod: S$PBB,,, | Performed by: INTERNAL MEDICINE

## 2023-02-06 PROCEDURE — 99214 OFFICE O/P EST MOD 30 MIN: CPT | Mod: S$PBB,,, | Performed by: INTERNAL MEDICINE

## 2023-02-06 PROCEDURE — 99999 PR PBB SHADOW E&M-EST. PATIENT-LVL III: ICD-10-PCS | Mod: PBBFAC,,, | Performed by: INTERNAL MEDICINE

## 2023-02-06 RX ORDER — UBIQUINOL 100 MG
CAPSULE ORAL
COMMUNITY

## 2023-02-06 RX ORDER — IBUPROFEN 200 MG
1 CAPSULE ORAL DAILY
COMMUNITY

## 2023-02-06 NOTE — PROGRESS NOTES
"  Subjective:       Patient ID: Giles Brvao is a 47 y.o.  female who presents for return patient evaluation for chronic renal failure.      She had a recent odontogenic infection.  She has no uremic or urinary symptoms and is in her usual state of health.  There have been no recent illnesses, hospitalizations or procedures.  She received rituxan on Feb 1st and 18th.  She had covid but recovered.      Review of Systems   Constitutional:  Negative for fatigue.   HENT:  Negative for congestion.    Respiratory:  Negative for shortness of breath.    Cardiovascular:  Negative for leg swelling.   Gastrointestinal:  Negative for diarrhea and nausea.   Genitourinary:  Negative for decreased urine volume, difficulty urinating and dysuria.   Musculoskeletal:  Negative for arthralgias.   Neurological:  Negative for headaches.   Psychiatric/Behavioral:  Negative for confusion.        The past medical, family and social histories were reviewed for this encounter.     BP (!) 142/90 (BP Location: Left arm, Patient Position: Sitting, BP Method: Medium (Manual))   Ht 5' 3" (1.6 m)   Wt 58.1 kg (128 lb)   BMI 22.67 kg/m²     Objective:      Physical Exam  Vitals reviewed.   Constitutional:       General: She is not in acute distress.  HENT:      Head: Normocephalic and atraumatic.   Eyes:      General: No scleral icterus.  Neck:      Vascular: No JVD.   Cardiovascular:      Rate and Rhythm: Normal rate and regular rhythm.      Heart sounds: No murmur heard.  Pulmonary:      Effort: Pulmonary effort is normal.      Breath sounds: Normal breath sounds.   Abdominal:      General: There is no distension.      Palpations: Abdomen is soft.   Skin:     General: Skin is warm and dry.   Neurological:      Mental Status: She is alert and oriented to person, place, and time.       Assessment:       1. Minimal change disease    2. Nephrotic range proteinuria        Plan:   Return to clinic in 12 months.  Labs for next visit " include rp, upc in [place] q 6 mo.    Baseline creatinine is 0.7.  UPC is negative.  Albumin is 4.3.  She is s/p rituxan for a frequently relapsing steroid dependent [facility] patient.  She is off of steroids and is doing great!

## 2023-02-09 ENCOUNTER — LAB VISIT (OUTPATIENT)
Dept: LAB | Facility: HOSPITAL | Age: 48
End: 2023-02-09
Attending: INTERNAL MEDICINE
Payer: OTHER GOVERNMENT

## 2023-02-09 ENCOUNTER — OFFICE VISIT (OUTPATIENT)
Dept: FAMILY MEDICINE | Facility: CLINIC | Age: 48
End: 2023-02-09
Payer: OTHER GOVERNMENT

## 2023-02-09 VITALS
HEART RATE: 75 BPM | HEIGHT: 63 IN | WEIGHT: 133 LBS | DIASTOLIC BLOOD PRESSURE: 74 MMHG | TEMPERATURE: 99 F | BODY MASS INDEX: 23.57 KG/M2 | SYSTOLIC BLOOD PRESSURE: 133 MMHG

## 2023-02-09 DIAGNOSIS — E11.9 TYPE 2 DIABETES MELLITUS WITHOUT COMPLICATION, WITHOUT LONG-TERM CURRENT USE OF INSULIN: Primary | ICD-10-CM

## 2023-02-09 DIAGNOSIS — N05.0 MINIMAL CHANGE DISEASE: ICD-10-CM

## 2023-02-09 DIAGNOSIS — Z00.00 ENCOUNTER FOR ANNUAL HEALTH EXAMINATION: ICD-10-CM

## 2023-02-09 DIAGNOSIS — E11.9 TYPE 2 DIABETES MELLITUS WITHOUT COMPLICATION, WITHOUT LONG-TERM CURRENT USE OF INSULIN: ICD-10-CM

## 2023-02-09 DIAGNOSIS — E03.4 HYPOTHYROIDISM DUE TO ACQUIRED ATROPHY OF THYROID: ICD-10-CM

## 2023-02-09 PROBLEM — G72.0 STATIN MYOPATHY: Status: ACTIVE | Noted: 2022-07-15

## 2023-02-09 PROBLEM — T46.6X5A STATIN MYOPATHY: Status: ACTIVE | Noted: 2022-07-15

## 2023-02-09 LAB
ALBUMIN SERPL BCP-MCNC: 4.5 G/DL (ref 3.5–5.2)
ALP SERPL-CCNC: 76 U/L (ref 55–135)
ALT SERPL W/O P-5'-P-CCNC: 17 U/L (ref 10–44)
ANION GAP SERPL CALC-SCNC: 11 MMOL/L (ref 8–16)
AST SERPL-CCNC: 18 U/L (ref 10–40)
BILIRUB SERPL-MCNC: 0.9 MG/DL (ref 0.1–1)
BUN SERPL-MCNC: 14 MG/DL (ref 6–20)
CALCIUM SERPL-MCNC: 9.7 MG/DL (ref 8.7–10.5)
CHLORIDE SERPL-SCNC: 104 MMOL/L (ref 95–110)
CO2 SERPL-SCNC: 26 MMOL/L (ref 23–29)
CREAT SERPL-MCNC: 0.7 MG/DL (ref 0.5–1.4)
EST. GFR  (NO RACE VARIABLE): >60 ML/MIN/1.73 M^2
ESTIMATED AVG GLUCOSE: 137 MG/DL (ref 68–131)
GLUCOSE SERPL-MCNC: 116 MG/DL (ref 70–110)
HBA1C MFR BLD: 6.4 % (ref 4–5.6)
POTASSIUM SERPL-SCNC: 4 MMOL/L (ref 3.5–5.1)
PROT SERPL-MCNC: 7.4 G/DL (ref 6–8.4)
SODIUM SERPL-SCNC: 141 MMOL/L (ref 136–145)

## 2023-02-09 PROCEDURE — 99999 PR PBB SHADOW E&M-EST. PATIENT-LVL IV: ICD-10-PCS | Mod: PBBFAC,,, | Performed by: INTERNAL MEDICINE

## 2023-02-09 PROCEDURE — 36415 COLL VENOUS BLD VENIPUNCTURE: CPT | Mod: PO | Performed by: INTERNAL MEDICINE

## 2023-02-09 PROCEDURE — 99396 PR PREVENTIVE VISIT,EST,40-64: ICD-10-PCS | Mod: S$PBB,,, | Performed by: INTERNAL MEDICINE

## 2023-02-09 PROCEDURE — 99396 PREV VISIT EST AGE 40-64: CPT | Mod: S$PBB,,, | Performed by: INTERNAL MEDICINE

## 2023-02-09 PROCEDURE — 80053 COMPREHEN METABOLIC PANEL: CPT | Performed by: INTERNAL MEDICINE

## 2023-02-09 PROCEDURE — 83036 HEMOGLOBIN GLYCOSYLATED A1C: CPT | Performed by: INTERNAL MEDICINE

## 2023-02-09 PROCEDURE — 99214 OFFICE O/P EST MOD 30 MIN: CPT | Mod: PBBFAC,PO | Performed by: INTERNAL MEDICINE

## 2023-02-09 PROCEDURE — 99999 PR PBB SHADOW E&M-EST. PATIENT-LVL IV: CPT | Mod: PBBFAC,,, | Performed by: INTERNAL MEDICINE

## 2023-02-09 NOTE — PROGRESS NOTES
This note is specifically for wellness visit performed today.     WELLNESS EXAM      Patient ID: Giles Bravo is a 48 y.o. female.  has a past medical history of Diabetes mellitus, Fatty liver, GERD (gastroesophageal reflux disease), Helicobacter pylori (H. pylori) infection (5/17/2019), Hepatomegaly, Hypothyroid, Minimal change disease, Pleural effusion on right (10/21/2019), and Severe malnutrition (5/11/2019).     Chief Complaint:  Encounter for wellness exam    Well Adult Physical: Patient here for a comprehensive physical exam. Patient reports no problems or complaints today in clinic.     Health Maintenance Topics with due status: Not Due       Topic Last Completion Date    TETANUS VACCINE 01/01/2016    Cervical Cancer Screening 01/15/2021    Pneumococcal Vaccines (Age 0-64) 08/05/2021    Colorectal Cancer Screening 12/01/2021    Diabetes Urine Screening 07/06/2022    Lipid Panel 07/06/2022    Foot Exam 02/09/2023    Hemoglobin A1c 02/09/2023    Mammogram 02/17/2023        ==============================================    Health Maintenance Due   Topic Date Due    COVID-19 Vaccine (1) Never done    Eye Exam  Never done    Shingles Vaccine (1 of 2) Never done    Influenza Vaccine (1) 09/01/2022       Past Medical History:  Past Medical History:   Diagnosis Date    Diabetes mellitus     recent diagnosis    Fatty liver     GERD (gastroesophageal reflux disease)     Helicobacter pylori (H. pylori) infection 5/17/2019    Hepatomegaly     Hypothyroid     Minimal change disease     Dr. Panchal    Pleural effusion on right 10/21/2019    Severe malnutrition 5/11/2019     Past Surgical History:   Procedure Laterality Date    COLONOSCOPY N/A 12/1/2021    Procedure: COLONOSCOPY;  Surgeon: Дмитрий Johnson MD;  Location: [facility];  Service: Endoscopy;  Laterality: N/A;    COSMETIC SURGERY      ESOPHAGOGASTRODUODENOSCOPY N/A 5/13/2019    Procedure: EGD (ESOPHAGOGASTRODUODENOSCOPY);  Surgeon: Bola Snyder,  MD;  Location: [facility] ENDO;  Service: Endoscopy;  Laterality: N/A; positive for h pylori    ESOPHAGOGASTRODUODENOSCOPY N/A 10/24/2019    Procedure: EGD (ESOPHAGOGASTRODUODENOSCOPY);  Surgeon: Matt Sanchez MD;  Location: [facility] ENDO;  Service: Endoscopy;  Laterality: N/A; gastritis; biopsy: stomach- MILD CHRONIC INACTIVE GASTRITIS AND INTESTINAL METAPLASIA; erosion; NEGATIVE FOR HELICOBACTER PYLORI; duodenum WNL    ESOPHAGOGASTRODUODENOSCOPY N/A 12/1/2021    Procedure: EGD (ESOPHAGOGASTRODUODENOSCOPY);  Surgeon: Дмитрий Johnson MD;  Location: [facility] ENDO;  Service: Endoscopy;  Laterality: N/A;    RENAL BIOPSY      May 2019 at [facility], path sent to [facility]     Review of patient's allergies indicates:  No Known Allergies  Current Outpatient Medications on File Prior to Visit   Medication Sig Dispense Refill    calcium citrate (CALCITRATE) 200 mg (950 mg) tablet Take 1 tablet by mouth once daily.      clotrimazole-betamethasone (LOTRISONE) lotion Apply topically 2 (two) times daily. 30 mL 0    coQ10, ubiquinol, 100 mg Cap Take by mouth.      metFORMIN (GLUCOPHAGE) 1000 MG tablet Take 1 tablet (1,000 mg total) by mouth daily with breakfast. 90 tablet 1    multivit-mins no.63/iron/folic (M-VIT ORAL) Take by mouth.      QUERCETIN DIHYDRATE, BULK, MISC by Misc.(Non-Drug; Combo Route) route.       No current facility-administered medications on file prior to visit.     Social History     Socioeconomic History    Marital status:    Tobacco Use    Smoking status: Never    Smokeless tobacco: Never   Substance and Sexual Activity    Alcohol use: Not Currently    Drug use: Never     Family History   Problem Relation Age of Onset    Cancer Father     Heart disease Father     Cancer Brother     Breast cancer Mother     Kidney disease Neg Hx     Colon cancer Neg Hx     Esophageal cancer Neg Hx     Stomach cancer Neg Hx        Review of Systems   Constitutional: Negative for chills, fever and unexpected weight change.    HENT: Negative for ear pain and sore throat.    Eyes: Negative for redness and visual disturbance.   Respiratory: Negative for cough and shortness of breath.    Cardiovascular: Negative for chest pain and palpitations.   Gastrointestinal: Negative for nausea and vomiting.   Musculoskeletal: Negative for arthralgias and myalgias.   Skin: Negative for rash and wound.   Neurological: Negative for weakness and headaches.         Objective:      Vitals:    02/09/23 1021   BP: 133/74   Pulse: 75   Temp: 98.6 °F (37 °C)     Body mass index is 23.56 kg/m².     Physical Exam   Constitutional: Oriented to person, place, and time. Appears well-developed and well-nourished. No distress.   HENT:   Head: Normocephalic and atraumatic.   Eyes: Pupils are equal, round, and reactive to light. EOM are normal.   Neck: Normal range of motion. Neck supple.   Cardiovascular: Normal rate, regular rhythm, normal heart sounds and intact distal pulses.   No murmur heard.  Pulmonary/Chest: Effort normal and breath sounds normal. No respiratory distress. No wheezes.   Musculoskeletal: Normal range of motion. Exhibits no edema.   Neurological: Alert and oriented to person, place, and time. No cranial nerve deficit.   Skin: Skin is warm and dry. Capillary refill takes less than 2 seconds.   Psychiatric: Normal mood and affect. Behavior is normal.   Nursing note and vitals reviewed.      Protective Sensation (w/ 10 gram monofilament):  Right: Intact  Left: Intact    Visual Inspection:  Normal -  Bilateral    Pedal Pulses:   Right: Present  Left: Present    Posterior Tibialis Pulses:   Right:Present  Left: Present    All labs, imaging and procedures performed since last visit have been personally reviewed.    Assessment / Plan:      Patient here for annual wellness exam. Health maintenance was reviewed and ordered.    Complete history and physical was completed today.  Complete and thorough medication reconciliation was performed.  Discussed  risks and benefits of medications.  Advised patient on orders and health maintenance.  We discussed old records and old labs if available.  Will request any records not available through epic.  Continue current medications listed on your summary sheet.    All questions were answered. Patient had no further concerns. Advised of diagnoses and plan. Follow up as planned or return sooner if symptoms persist or worsen.     Problem List Items Addressed This Visit          Renal/    Minimal change disease    Overview     -followed by nephrology  -no longer on prednisone or rituxan   -renal function stable/wnl; protein/creatinine wnl            Endocrine    Hypothyroidism    Overview     Lab Results   Component Value Date    TSH 2.537 07/06/2022   -no longer thyroid replacement           Type 2 diabetes mellitus without complication, without long-term current use of insulin - Primary    Overview     Diabetes Medications               metFORMIN (GLUCOPHAGE) 1000 MG tablet Take 1 tablet (1,000 mg total) by mouth once daily at 6am.   -condition is currently controlled  -plan to continue current medications  -see diabetic health maintenance listed below  -on statin: No-intolerant  -on ACE-I/ARB: No  -counseling provided on importance of diabetic diet and medication compliance in order to treat diabetes  -discussed diabetes disease course and potential complications         Relevant Orders    Comprehensive Metabolic Panel (Completed)     Other Visit Diagnoses       Encounter for annual health examination                Follow up in about 6 months (around 8/9/2023) for in office f/u Radha; a1c and cmp today.    Ginna Juarez MD

## 2023-02-17 ENCOUNTER — HOSPITAL ENCOUNTER (OUTPATIENT)
Dept: RADIOLOGY | Facility: HOSPITAL | Age: 48
Discharge: HOME | End: 2023-02-17
Attending: INTERNAL MEDICINE
Payer: OTHER GOVERNMENT

## 2023-02-17 DIAGNOSIS — R92.8 ABNORMAL MAMMOGRAM OF LEFT BREAST: ICD-10-CM

## 2023-02-17 PROCEDURE — 77065 MAMMO DIGITAL DIAGNOSTIC LEFT WITH TOMO: ICD-10-PCS | Mod: 26,LT,, | Performed by: RADIOLOGY

## 2023-02-17 PROCEDURE — 77061 BREAST TOMOSYNTHESIS UNI: CPT | Mod: 26,LT,, | Performed by: RADIOLOGY

## 2023-02-17 PROCEDURE — 77061 MAMMO DIGITAL DIAGNOSTIC LEFT WITH TOMO: ICD-10-PCS | Mod: 26,LT,, | Performed by: RADIOLOGY

## 2023-02-17 PROCEDURE — 77065 DX MAMMO INCL CAD UNI: CPT | Mod: 26,LT,, | Performed by: RADIOLOGY

## 2023-02-17 PROCEDURE — 77065 DX MAMMO INCL CAD UNI: CPT | Mod: TC,PO,LT

## 2023-02-17 NOTE — PROGRESS NOTES
Normal mammogram, repeat in 1 year, results released through [employer]. Please verify that patient has viewed results. If not, please call patient with interpretation below:    I have reviewed the results of your mammogram and it appears that everything was read as normal.  Based on this, the radiologist has recommended that you recheck a mammogram in 1 year.     Also please see below health maintenance items that are due:    COVID-19 Vaccine(1) Never done  Eye Exam Never done  Influenza Vaccine(1) due on 09/01/2022

## 2023-03-02 NOTE — PROGRESS NOTES
Results have been released via [employer]. Please verify that these have been viewed by patient. If not, please call patient with results.    I have reviewed your recent results.    A1C NORMAL-The A1c is at goal.  Repeat an a1c in 6 months.       CMP/BMP NORMAL-The electrolytes all appear stable at this time.  This includes kidney functions along with routine electrolytes like sugar, potassium and sodium.  The liver enzymes were noted to be stable also.    Please let me know if you have any additional questions or concerns about above.

## 2023-04-19 ENCOUNTER — PATIENT MESSAGE (OUTPATIENT)
Dept: ADMINISTRATIVE | Facility: HOSPITAL | Age: 48
End: 2023-04-19
Payer: OTHER GOVERNMENT

## 2023-07-19 DIAGNOSIS — E11.9 TYPE 2 DIABETES MELLITUS WITHOUT COMPLICATION: ICD-10-CM

## 2023-08-07 ENCOUNTER — LAB VISIT (OUTPATIENT)
Dept: LAB | Facility: HOSPITAL | Age: 48
End: 2023-08-07
Attending: INTERNAL MEDICINE
Payer: OTHER GOVERNMENT

## 2023-08-07 DIAGNOSIS — R80.9 NEPHROTIC RANGE PROTEINURIA: ICD-10-CM

## 2023-08-07 DIAGNOSIS — N05.0 MINIMAL CHANGE DISEASE: ICD-10-CM

## 2023-08-07 LAB
ALBUMIN SERPL BCP-MCNC: 4.3 G/DL (ref 3.5–5.2)
ANION GAP SERPL CALC-SCNC: 13 MMOL/L (ref 8–16)
BUN SERPL-MCNC: 13 MG/DL (ref 6–20)
CALCIUM SERPL-MCNC: 9.7 MG/DL (ref 8.7–10.5)
CHLORIDE SERPL-SCNC: 103 MMOL/L (ref 95–110)
CO2 SERPL-SCNC: 23 MMOL/L (ref 23–29)
CREAT SERPL-MCNC: 0.8 MG/DL (ref 0.5–1.4)
CREAT UR-MCNC: 123 MG/DL (ref 15–325)
EST. GFR  (NO RACE VARIABLE): >60 ML/MIN/1.73 M^2
GLUCOSE SERPL-MCNC: 187 MG/DL (ref 70–110)
PHOSPHATE SERPL-MCNC: 3.1 MG/DL (ref 2.7–4.5)
POTASSIUM SERPL-SCNC: 3.9 MMOL/L (ref 3.5–5.1)
PROT UR-MCNC: 12 MG/DL (ref 0–15)
PROT/CREAT UR: 0.1 MG/G{CREAT} (ref 0–0.2)
SODIUM SERPL-SCNC: 139 MMOL/L (ref 136–145)

## 2023-08-07 PROCEDURE — 82570 ASSAY OF URINE CREATININE: CPT | Performed by: INTERNAL MEDICINE

## 2023-08-07 PROCEDURE — 36415 COLL VENOUS BLD VENIPUNCTURE: CPT | Mod: PO | Performed by: INTERNAL MEDICINE

## 2023-08-07 PROCEDURE — 80069 RENAL FUNCTION PANEL: CPT | Performed by: INTERNAL MEDICINE

## 2023-08-10 ENCOUNTER — LAB VISIT (OUTPATIENT)
Dept: LAB | Facility: HOSPITAL | Age: 48
End: 2023-08-10
Attending: PHYSICIAN ASSISTANT
Payer: OTHER GOVERNMENT

## 2023-08-10 ENCOUNTER — OFFICE VISIT (OUTPATIENT)
Dept: FAMILY MEDICINE | Facility: CLINIC | Age: 48
End: 2023-08-10
Payer: OTHER GOVERNMENT

## 2023-08-10 VITALS
SYSTOLIC BLOOD PRESSURE: 139 MMHG | DIASTOLIC BLOOD PRESSURE: 88 MMHG | HEART RATE: 91 BPM | TEMPERATURE: 98 F | WEIGHT: 135 LBS | BODY MASS INDEX: 23.92 KG/M2 | HEIGHT: 63 IN

## 2023-08-10 DIAGNOSIS — E11.9 TYPE 2 DIABETES MELLITUS WITHOUT COMPLICATION, WITHOUT LONG-TERM CURRENT USE OF INSULIN: ICD-10-CM

## 2023-08-10 DIAGNOSIS — E78.2 MIXED HYPERLIPIDEMIA: ICD-10-CM

## 2023-08-10 DIAGNOSIS — E03.9 HYPOTHYROIDISM, UNSPECIFIED TYPE: ICD-10-CM

## 2023-08-10 DIAGNOSIS — K76.0 FATTY LIVER DISEASE, NONALCOHOLIC: ICD-10-CM

## 2023-08-10 DIAGNOSIS — N05.0 MINIMAL CHANGE DISEASE: ICD-10-CM

## 2023-08-10 DIAGNOSIS — E11.9 TYPE 2 DIABETES MELLITUS WITHOUT COMPLICATION, WITHOUT LONG-TERM CURRENT USE OF INSULIN: Primary | ICD-10-CM

## 2023-08-10 LAB
ALBUMIN SERPL BCP-MCNC: 4.3 G/DL (ref 3.5–5.2)
ALP SERPL-CCNC: 74 U/L (ref 55–135)
ALT SERPL W/O P-5'-P-CCNC: 25 U/L (ref 10–44)
ANION GAP SERPL CALC-SCNC: 13 MMOL/L (ref 8–16)
AST SERPL-CCNC: 24 U/L (ref 10–40)
BASOPHILS # BLD AUTO: 0.04 K/UL (ref 0–0.2)
BASOPHILS NFR BLD: 0.4 % (ref 0–1.9)
BILIRUB SERPL-MCNC: 1.2 MG/DL (ref 0.1–1)
BUN SERPL-MCNC: 12 MG/DL (ref 6–20)
CALCIUM SERPL-MCNC: 9.7 MG/DL (ref 8.7–10.5)
CHLORIDE SERPL-SCNC: 105 MMOL/L (ref 95–110)
CHOLEST SERPL-MCNC: 219 MG/DL (ref 120–199)
CHOLEST/HDLC SERPL: 5.1 {RATIO} (ref 2–5)
CO2 SERPL-SCNC: 22 MMOL/L (ref 23–29)
CREAT SERPL-MCNC: 0.7 MG/DL (ref 0.5–1.4)
DIFFERENTIAL METHOD: ABNORMAL
EOSINOPHIL # BLD AUTO: 0.2 K/UL (ref 0–0.5)
EOSINOPHIL NFR BLD: 1.6 % (ref 0–8)
ERYTHROCYTE [DISTWIDTH] IN BLOOD BY AUTOMATED COUNT: 12.3 % (ref 11.5–14.5)
EST. GFR  (NO RACE VARIABLE): >60 ML/MIN/1.73 M^2
ESTIMATED AVG GLUCOSE: 134 MG/DL (ref 68–131)
GLUCOSE SERPL-MCNC: 124 MG/DL (ref 70–110)
HBA1C MFR BLD: 6.3 % (ref 4–5.6)
HCT VFR BLD AUTO: 41.5 % (ref 37–48.5)
HDLC SERPL-MCNC: 43 MG/DL (ref 40–75)
HDLC SERPL: 19.6 % (ref 20–50)
HGB BLD-MCNC: 13.6 G/DL (ref 12–16)
IMM GRANULOCYTES # BLD AUTO: 0.03 K/UL (ref 0–0.04)
IMM GRANULOCYTES NFR BLD AUTO: 0.3 % (ref 0–0.5)
LDLC SERPL CALC-MCNC: 104.6 MG/DL (ref 63–159)
LYMPHOCYTES # BLD AUTO: 1.1 K/UL (ref 1–4.8)
LYMPHOCYTES NFR BLD: 11.4 % (ref 18–48)
MCH RBC QN AUTO: 30.1 PG (ref 27–31)
MCHC RBC AUTO-ENTMCNC: 32.8 G/DL (ref 32–36)
MCV RBC AUTO: 92 FL (ref 82–98)
MONOCYTES # BLD AUTO: 0.6 K/UL (ref 0.3–1)
MONOCYTES NFR BLD: 6.7 % (ref 4–15)
NEUTROPHILS # BLD AUTO: 7.5 K/UL (ref 1.8–7.7)
NEUTROPHILS NFR BLD: 79.6 % (ref 38–73)
NONHDLC SERPL-MCNC: 176 MG/DL
NRBC BLD-RTO: 0 /100 WBC
PLATELET # BLD AUTO: 225 K/UL (ref 150–450)
PMV BLD AUTO: 9.9 FL (ref 9.2–12.9)
POTASSIUM SERPL-SCNC: 3.9 MMOL/L (ref 3.5–5.1)
PROT SERPL-MCNC: 7.5 G/DL (ref 6–8.4)
RBC # BLD AUTO: 4.52 M/UL (ref 4–5.4)
SODIUM SERPL-SCNC: 140 MMOL/L (ref 136–145)
TRIGL SERPL-MCNC: 357 MG/DL (ref 30–150)
TSH SERPL DL<=0.005 MIU/L-ACNC: 1.3 UIU/ML (ref 0.4–4)
WBC # BLD AUTO: 9.37 K/UL (ref 3.9–12.7)

## 2023-08-10 PROCEDURE — 99999 PR PBB SHADOW E&M-EST. PATIENT-LVL IV: ICD-10-PCS | Mod: PBBFAC,,, | Performed by: PHYSICIAN ASSISTANT

## 2023-08-10 PROCEDURE — 99214 PR OFFICE/OUTPT VISIT, EST, LEVL IV, 30-39 MIN: ICD-10-PCS | Mod: S$PBB,,, | Performed by: PHYSICIAN ASSISTANT

## 2023-08-10 PROCEDURE — 85025 COMPLETE CBC W/AUTO DIFF WBC: CPT | Performed by: PHYSICIAN ASSISTANT

## 2023-08-10 PROCEDURE — 99214 OFFICE O/P EST MOD 30 MIN: CPT | Mod: PBBFAC,PO | Performed by: PHYSICIAN ASSISTANT

## 2023-08-10 PROCEDURE — 80053 COMPREHEN METABOLIC PANEL: CPT | Performed by: PHYSICIAN ASSISTANT

## 2023-08-10 PROCEDURE — 36415 COLL VENOUS BLD VENIPUNCTURE: CPT | Mod: PO | Performed by: PHYSICIAN ASSISTANT

## 2023-08-10 PROCEDURE — 83036 HEMOGLOBIN GLYCOSYLATED A1C: CPT | Performed by: PHYSICIAN ASSISTANT

## 2023-08-10 PROCEDURE — 99999 PR PBB SHADOW E&M-EST. PATIENT-LVL IV: CPT | Mod: PBBFAC,,, | Performed by: PHYSICIAN ASSISTANT

## 2023-08-10 PROCEDURE — 99214 OFFICE O/P EST MOD 30 MIN: CPT | Mod: S$PBB,,, | Performed by: PHYSICIAN ASSISTANT

## 2023-08-10 PROCEDURE — 84443 ASSAY THYROID STIM HORMONE: CPT | Performed by: PHYSICIAN ASSISTANT

## 2023-08-10 PROCEDURE — 80061 LIPID PANEL: CPT | Performed by: PHYSICIAN ASSISTANT

## 2023-08-10 NOTE — PROGRESS NOTES
Assessment/Plan:    Problem List Items Addressed This Visit          Cardiac/Vascular    Mixed hyperlipidemia    Overview     -chronic condition. Currently stable.    -intolerant of statins  -most recent labs listed below; due for repeat lipid panel:  Lab Results   Component Value Date    CHOL 210 (H) 07/06/2022     Lab Results   Component Value Date    HDL 45 07/06/2022     Lab Results   Component Value Date    LDLCALC 112.4 07/06/2022     Lab Results   Component Value Date    TRIG 263 (H) 07/06/2022     Lab Results   Component Value Date    ALT 16 07/13/2022    AST 19 07/13/2022    ALKPHOS 74 07/13/2022    BILITOT 0.9 07/13/2022          Relevant Orders    Lipid Panel       Renal/    Minimal change disease    Overview     -followed by nephrology  -no longer on prednisone or rituxan   -renal function stable/wnl; protein/creatinine wnl            Endocrine    Hypothyroidism    Overview     Lab Results   Component Value Date    TSH 2.537 07/06/2022   -no longer thyroid replacement         Relevant Orders    TSH    Type 2 diabetes mellitus without complication, without long-term current use of insulin - Primary    Overview     Diabetes Medications               metFORMIN (GLUCOPHAGE) 1000 MG tablet Take 1 tablet (1,000 mg total) by mouth once daily at 6am.   -condition is currently controlled  -plan to continue current medications  -see diabetic health maintenance listed below  -on statin: No-intolerant  -on ACE-I/ARB: No  -counseling provided on importance of diabetic diet and medication compliance in order to treat diabetes  -discussed diabetes disease course and potential complications         Relevant Orders    Hemoglobin A1C    CBC Auto Differential    Comprehensive Metabolic Panel     Other Visit Diagnoses       Fatty liver disease, nonalcoholic        Relevant Orders    CBC Auto Differential    US Abdomen Limited    Comprehensive Metabolic Panel            Follow up in about 6 months (around 2/10/2024), or if  symptoms worsen or fail to improve.    Radha Saldivar PA-C  _____________________________________________________________________________________________________________________________________________________    CC: follow up for chronic conditions    HPI: Patient is in clinic today as an established patient here for follow up for chronic conditions.     DM2: Patient presents for follow up of diabetes. Condition is chronic and stable. Patient denies symptoms including foot ulcerations, hyperglycemia, hypoglycemia, nausea, paresthesia of the feet, polydipsia, polyuria and visual disturbances.  Evaluation to date has been included: fasting blood sugar, fasting lipid panel, hemoglobin A1C and microalbuminuria. Denies adverse effects of current medications.     Diabetes Management Status    Statin: Not taking  ACE/ARB: Not taking    Screening or Prevention Patient's value Goal Complete/Controlled?   HgA1C Testing and Control   Lab Results   Component Value Date    HGBA1C 6.4 (H) 02/09/2023      Annually/Less than 8% Yes   Lipid profile : 07/06/2022 Annually No   LDL control Lab Results   Component Value Date    LDLCALC 112.4 07/06/2022    Annually/Less than 100 mg/dl  No   Nephropathy screening Lab Results   Component Value Date    LABMICR <5.0 07/06/2022     Lab Results   Component Value Date    PROTEINUA Negative 10/15/2020    Annually Yes   Blood pressure BP Readings from Last 1 Encounters:   08/10/23 139/88    Less than 140/90 Yes   Dilated retinal exam Most Recent Eye Exam Date: Not Found Annually Yes   Foot exam   : 02/09/2023 Annually Yes      Patient also concerned for liver. On previous imaging, there were findings suggestive of hepatic steatosis. Liver enzymes have been stable.      Remaining chronic conditions have been reviewed and remain stable. Further detail as stated above. No other complaints today.     Past Medical History:   Diagnosis Date    Diabetes mellitus     recent diagnosis    Fatty liver      GERD (gastroesophageal reflux disease)     Helicobacter pylori (H. pylori) infection 5/17/2019    Hepatomegaly     Hypothyroid     Minimal change disease     Dr. Panchal    Pleural effusion on right 10/21/2019    Severe malnutrition 5/11/2019     Past Surgical History:   Procedure Laterality Date    COLONOSCOPY N/A 12/1/2021    Procedure: COLONOSCOPY;  Surgeon: Дмитрий Johnson MD;  Location: [facility];  Service: Endoscopy;  Laterality: N/A;    COSMETIC SURGERY      ESOPHAGOGASTRODUODENOSCOPY N/A 5/13/2019    Procedure: EGD (ESOPHAGOGASTRODUODENOSCOPY);  Surgeon: Bola Snyder MD;  Location: [facility];  Service: Endoscopy;  Laterality: N/A; positive for h pylori    ESOPHAGOGASTRODUODENOSCOPY N/A 10/24/2019    Procedure: EGD (ESOPHAGOGASTRODUODENOSCOPY);  Surgeon: Matt Sanchez MD;  Location: [facility];  Service: Endoscopy;  Laterality: N/A; gastritis; biopsy: stomach- MILD CHRONIC INACTIVE GASTRITIS AND INTESTINAL METAPLASIA; erosion; NEGATIVE FOR HELICOBACTER PYLORI; duodenum WNL    ESOPHAGOGASTRODUODENOSCOPY N/A 12/1/2021    Procedure: EGD (ESOPHAGOGASTRODUODENOSCOPY);  Surgeon: Дмитрий Johnson MD;  Location: [facility];  Service: Endoscopy;  Laterality: N/A;    RENAL BIOPSY      May 2019 at [facility], path sent to [facility]     Review of patient's allergies indicates:  No Known Allergies  Social History     Tobacco Use    Smoking status: Never    Smokeless tobacco: Never   Substance Use Topics    Alcohol use: Not Currently    Drug use: Never     Family History   Problem Relation Age of Onset    Cancer Father     Heart disease Father     Cancer Brother     Breast cancer Mother     Kidney disease Neg Hx     Colon cancer Neg Hx     Esophageal cancer Neg Hx     Stomach cancer Neg Hx      Current Outpatient Medications on File Prior to Visit   Medication Sig Dispense Refill    calcium citrate (CALCITRATE) 200 mg (950 mg) tablet Take 1 tablet by mouth once daily.      clotrimazole-betamethasone  "(LOTRISONE) lotion Apply topically 2 (two) times daily. 30 mL 0    coQ10, ubiquinol, 100 mg Cap Take by mouth.      metFORMIN (GLUCOPHAGE) 1000 MG tablet Take 1 tablet (1,000 mg total) by mouth daily with breakfast. 90 tablet 1    multivit-mins no.63/iron/folic (M-VIT ORAL) Take by mouth.      QUERCETIN DIHYDRATE, BULK, MISC by Misc.(Non-Drug; Combo Route) route.       No current facility-administered medications on file prior to visit.       Review of Systems   Constitutional:  Negative for chills, diaphoresis, fatigue and fever.   HENT:  Negative for congestion, ear pain, postnasal drip, sinus pain and sore throat.    Eyes:  Negative for pain and redness.   Respiratory:  Negative for cough, chest tightness and shortness of breath.    Cardiovascular:  Negative for chest pain and leg swelling.   Gastrointestinal:  Negative for abdominal pain, constipation, diarrhea, nausea and vomiting.   Genitourinary:  Negative for dysuria and hematuria.   Musculoskeletal:  Negative for arthralgias and joint swelling.   Skin:  Negative for rash.   Neurological:  Negative for dizziness, syncope and headaches.   Psychiatric/Behavioral:  Negative for dysphoric mood. The patient is not nervous/anxious.        Vitals:    08/10/23 0756   BP: 139/88   Pulse: 91   Temp: 98.4 °F (36.9 °C)   Weight: 61.2 kg (135 lb)   Height: 5' 3" (1.6 m)       Wt Readings from Last 3 Encounters:   08/10/23 61.2 kg (135 lb)   02/09/23 60.3 kg (133 lb)   02/06/23 58.1 kg (128 lb)       Physical Exam  Constitutional:       General: She is not in acute distress.     Appearance: Normal appearance. She is well-developed.   HENT:      Head: Normocephalic and atraumatic.   Eyes:      Conjunctiva/sclera: Conjunctivae normal.   Cardiovascular:      Rate and Rhythm: Normal rate and regular rhythm.      Pulses: Normal pulses.      Heart sounds: Normal heart sounds. No murmur heard.  Pulmonary:      Effort: Pulmonary effort is normal. No respiratory distress.      " Breath sounds: Normal breath sounds.   Abdominal:      General: Bowel sounds are normal. There is no distension.      Palpations: Abdomen is soft.      Tenderness: There is no abdominal tenderness.   Musculoskeletal:         General: Normal range of motion.      Cervical back: Normal range of motion and neck supple.   Skin:     General: Skin is warm and dry.      Findings: No rash.   Neurological:      General: No focal deficit present.      Mental Status: She is alert and oriented to person, place, and time.   Psychiatric:         Mood and Affect: Mood normal.         Behavior: Behavior normal.         Health Maintenance   Topic Date Due    Eye Exam  Never done    Lipid Panel  07/06/2023    Hemoglobin A1c  08/09/2023    Foot Exam  02/09/2024    Mammogram  02/17/2024    TETANUS VACCINE  01/01/2026    Hepatitis C Screening  Completed

## 2023-08-11 ENCOUNTER — HOSPITAL ENCOUNTER (OUTPATIENT)
Dept: RADIOLOGY | Facility: HOSPITAL | Age: 48
Discharge: HOME | End: 2023-08-11
Attending: PHYSICIAN ASSISTANT
Payer: OTHER GOVERNMENT

## 2023-08-11 DIAGNOSIS — K76.0 FATTY LIVER DISEASE, NONALCOHOLIC: ICD-10-CM

## 2023-08-11 PROCEDURE — 76705 US ABDOMEN LIMITED: ICD-10-PCS | Mod: 26,,, | Performed by: RADIOLOGY

## 2023-08-11 PROCEDURE — 76705 ECHO EXAM OF ABDOMEN: CPT | Mod: 26,,, | Performed by: RADIOLOGY

## 2023-08-11 PROCEDURE — 76705 ECHO EXAM OF ABDOMEN: CPT | Mod: TC,PO

## 2023-08-11 NOTE — PROGRESS NOTES
Results have been released via [employer]. Please verify that these have been viewed by patient. If not, please call patient with results.     I have sent a message to them with the following interpretation (see below).    I have reviewed your recent blood work.     A1C NORMAL-The A1c is at goal. Repeat an a1c in 6 months.     CBC NORMAL-The CBC appears to be stable at this time with no sign of major anemia, abnormal white count or platelet abnormality.  CMP/BMP NORMAL-The electrolytes all appear stable at this time. This includes kidney functions along with routine electrolytes like sugar, potassium and sodium. The liver enzymes were noted to be stable also.  LIPID-ABNORMAL-Your cholesterol and triglycerides are elevated, however your overall risk of cardiovascular disease is still low. I would still recommend working on lifestyle modification with low fat, low carb diet and exercise to improve these numbers and further decrease your risk of heart disease.  TSH NORMAL-The TSH screening indicated a normal function of the thyroid.    Please do not hesitate to call or message with any additional questions or concerns.    Radha Saldivar PA-C

## 2023-08-11 NOTE — PROGRESS NOTES
Results have been released via [employer]. Please verify that these have been viewed by patient. If not, please call patient with results.     I have sent a message to them with the following interpretation (see below).    I have reviewed your recent abdominal US which showed findings consistent with fatty liver disease as noted on previous imaging. This is treated with a low fat diet.     Please do not hesitate to call or message with any additional questions or concerns.    Radha Saldivar PA-C

## 2023-11-22 DIAGNOSIS — E11.9 TYPE 2 DIABETES MELLITUS WITHOUT COMPLICATION: ICD-10-CM

## 2023-12-17 NOTE — TELEPHONE ENCOUNTER
Bijan Gusman - Emergency Dept  [facility]  History & Physical    Patient Name: Radha Sandoval  MRN: [id number]  Patient Class: IP- Inpatient  Admission Date: 12/16/2023  Attending Physician: Maurice Lemus MD   Primary Care Provider: Agatha Alvarez         Patient information was obtained from patient, past medical records, and ER records.     Subjective:     Principal Problem:Acute encephalopathy    Chief Complaint:   Chief Complaint   Patient presents with    Altered Mental Status     Arrives via EMS from [facility] for AMS. Per staff, went more altered and hallucinating. Only oriented to self at baseline         HPI: Radha Sandoval is a 85 y.o. female with a PMHx of DVT/PE, chronic bilateral pleural effusions, HTN, severe late term alzheimer's dementia who [place] [facility] for evaluation of altered mental status. Patient is unable to provide any meaningful history due to dementia. Family friend at bedside assists with history. Per chart review, patient noted to be more confused this afternoon by NH staff. She is oriented to person only at baseline. Presentation is similar to when she had a UTI a few months ago. No known recent melena or bloody stools, however they aren't sure since patient lives in a NH. Patient complains of recent right shoulder and face pain but then later denies any recent pain. Denies abdominal or chest pain. Remainder of history limited 2/2 dementia.     ED: hypertensive top /77, vitals otherwise stable. No leukocytosis. Hgb 6.7, baseline ~8. Cr 1.4, slightly worse from baseline ~1.1. CTH/CXR negative for acute findings. Given 1U pRBCs.     Past Medical History:   Diagnosis Date    Acute deep vein thrombosis (DVT) of femoral vein of right lower extremity 5/23/2023    Acute pulmonary embolism 5/22/2023    Acute pulmonary embolism without acute cor pulmonale 5/22/2023    Chronic bilateral pleural effusions 5/22/2023    Debility 4/25/2023    Hygroma 7/8/2023     Spoke to the pt's , Facundo and he verbally understood that Dr Panchal said to increase the Prednisone to 40 mg every day and she will get repeat labs after her appointment in [place].   Late onset Alzheimer's dementia with mood disturbance 5/22/2023    Lumbar spondylosis with myelopathy 4/25/2023    Multiple closed right sided fractures of pelvis without disruption of pelvic ring 7/9/2023    Primary hypertension 6/10/2022    Subdural hygroma 7/8/2023       Past Surgical History:   Procedure Laterality Date    REPAIR, HERNIA, INGUINAL, WITHOUT HISTORY OF PRIOR REPAIR, AGE 3 YEARS OR OLDER Right 5/6/2023    Procedure: REPAIR, HERNIA, INGUINAL, WITHOUT HISTORY OF PRIOR REPAIR, AGE 3 YEARS OR OLDER;  Surgeon: Maurice Piper MD;  Location: [facility] OR [address] [place] [address];  Service: General;  Laterality: Right;  possible laparotomy, possible bowel resection       Review of patient's allergies indicates:  No Known Allergies    No current facility-administered medications on file prior to encounter.     Current Outpatient Medications on File Prior to Encounter   Medication Sig    acetaminophen (TYLENOL) 325 MG tablet Take 2 tablets (650 mg total) by mouth every 6 (six) hours as needed (Pain).    albuterol (PROVENTIL HFA) 90 mcg/actuation inhaler Inhale 2 puffs into the lungs every 4 (four) hours as needed for Wheezing. Use spacer with adult mask    apixaban (ELIQUIS) 5 mg Tab Take 1 tablet (5 mg total) by mouth 2 (two) times daily. Starting 8/5    cholecalciferol, vitamin D3, (VITAMIN D3) 50 mcg (2,000 unit) Tab Take 1 tablet (2,000 Units total) by mouth once daily.    EScitalopram oxalate (LEXAPRO) 5 MG Tab Take 2 tablets (10 mg total) by mouth once daily.    furosemide (LASIX) 20 MG tablet Take 1 tablet (20 mg total) by mouth once daily.    lisinopriL (PRINIVIL,ZESTRIL) 40 MG tablet Take 1 tablet (40 mg total) by mouth once daily.    loperamide (IMODIUM) 2 mg capsule Take 1 capsule (2 mg total) by mouth 4 (four) times daily as needed for Diarrhea.    melatonin (MELATIN) 3 mg tablet Take 2 tablets (6 mg total) by mouth nightly as needed for Insomnia.    memantine (NAMENDA) 5 MG Tab Take 1 tablet (5 mg total) by mouth  2 (two) times daily.    metoprolol tartrate (LOPRESSOR) 25 MG tablet Take 1 tablet (25 mg total) by mouth 2 (two) times daily.    miconazole NITRATE 2 % (MICOTIN) 2 % top powder Apply topically 2 (two) times daily. Underside of bilateral breasts    polyethylene glycol (GLYCOLAX) 17 gram PwPk Take 17 g by mouth 2 (two) times daily as needed.    potassium chloride (KLOR-CON) 10 MEQ TbSR Take 1 tablet (10 mEq total) by mouth once daily.     Family History    None       Tobacco Use    Smoking status: Never    Smokeless tobacco: Never   Substance and Sexual Activity    Alcohol use: Not on file    Drug use: Never    Sexual activity: Not Currently     Review of Systems   Unable to perform ROS: Dementia     Objective:     Vital Signs (Most Recent):  Temp: 97.6 °F (36.4 °C) (12/16/23 2315)  Pulse: 79 (12/16/23 2315)  Resp: 18 (12/16/23 2315)  BP: (!) 162/70 (12/16/23 2315)  SpO2: 96 % (12/16/23 2315) Vital Signs (24h Range):  Temp:  [97.6 °F (36.4 °C)-98.3 °F (36.8 °C)] 97.6 °F (36.4 °C)  Pulse:  [79-90] 79  Resp:  [16-20] 18  SpO2:  [96 %-100 %] 96 %  BP: (142-186)/(52-86) 162/70     Weight: 60.8 kg (134 lb)  Body mass index is 23 kg/m².     Physical Exam  Vitals and nursing note reviewed.   Constitutional:       General: She is not in acute distress.     Appearance: She is well-developed.   HENT:      Head: Normocephalic and atraumatic.      Mouth/Throat:      Pharynx: No oropharyngeal exudate.   Eyes:      Conjunctiva/sclera: Conjunctivae normal.   Cardiovascular:      Rate and Rhythm: Normal rate and regular rhythm.      Heart sounds: Normal heart sounds.   Pulmonary:      Effort: Pulmonary effort is normal. No respiratory distress.      Breath sounds: Normal breath sounds. No wheezing.   Abdominal:      General: Bowel sounds are normal. There is no distension.      Palpations: Abdomen is soft.      Tenderness: There is no abdominal tenderness.   Musculoskeletal:         General: No tenderness. Normal range of motion.       Cervical back: Normal range of motion and neck supple.   Lymphadenopathy:      Cervical: No cervical adenopathy.   Skin:     General: Skin is warm and dry.      Capillary Refill: Capillary refill takes less than 2 seconds.      Findings: No rash.   Neurological:      General: No focal deficit present.      Mental Status: She is alert. She is disoriented.      Cranial Nerves: No cranial nerve deficit.      Sensory: No sensory deficit.      Coordination: Coordination normal.   Psychiatric:         Mood and Affect: Mood normal.         Speech: Speech normal.         Behavior: Behavior normal.                Significant Labs: All pertinent labs within the past 24 hours have been reviewed.  BMP:   Recent Labs   Lab 12/16/23 2001         K 3.3*      CO2 21*   BUN 20   CREATININE 1.4   CALCIUM 9.0     CBC:   Recent Labs   Lab 12/16/23 2001   WBC 7.11   HGB 6.7*   HCT 21.0*          Significant Imaging: I have reviewed all pertinent imaging results/findings within the past 24 hours.  CT Head Without Contrast  Narrative: EXAMINATION:  CT HEAD WITHOUT CONTRAST    CLINICAL HISTORY:  Mental status change, unknown cause;    TECHNIQUE:  Low dose axial CT images obtained throughout the head without intravenous contrast. Sagittal and coronal reconstructions were performed.  Examination was repeated due to motion artifact.    COMPARISON:  CT head dated 08/09/2023.    FINDINGS:  Ventricles and sulci are normal in size for age without evidence of hydrocephalus. No extra-axial blood or fluid collections.  Resolution of the previously seen chronic left subdural hematoma.  There are chronic microvascular ischemic changes, stable.  No parenchymal mass, hemorrhage, edema or major vascular distribution infarct.    No calvarial fracture.  The scalp is unremarkable.  Bilateral paranasal sinuses and mastoid air cells are clear.  Impression: No acute intracranial abnormality.    Electronically signed by: Luis  Andreea  Date:    12/16/2023  Time:    22:41  X-Ray Chest AP Portable  Narrative: EXAMINATION:  XR CHEST AP PORTABLE    CLINICAL HISTORY:  Altered mental status, unspecified    TECHNIQUE:  Single frontal view of the chest was performed.    COMPARISON:  09/20/2023.    FINDINGS:  Right hemidiaphragm elevation, similar to prior.  Accessory azygous lobe.  Chronic increased markings in the lungs, similar to prior.  Perihilar vascular congestion appears less pronounced.    Heart and lungs otherwise appear unchanged when allowing for differences in technique and positioning.    Retrocardiac hiatal hernia, similar to prior.  Impression: No acute findings.    No significant change from prior study.    Electronically signed by: Olaf Boudreaux MD  Date:    12/16/2023  Time:    21:05      Assessment/Plan:     * Acute encephalopathy  - suspect 2/2 UTI  - no focal deficits  - CTH negative for acute abnormalities  - treat UTI as below  - neuro checks q4hr  - delirium precautions     Acute cystitis without hematuria  - afebrile without leukocytosis  - UA infectious  - started on ampicillin based on prior urine cultures, will continue  - ID consulted for antibiotic recs  - follow Ucx  - strict I/Os    Anemia  - Hgb 6.7, baseline ~8  - FOBT positive but no other evidence of overt GI bleeding  - s/p 1U pRBCs in the ED  - will give 80mg protonic IVP x1  - further IV PPI/ GI consult pending H/H trend  - NPO at MN as precaution  - holding eliquis     GINA (acute kidney injury)  - Cr 1.4, baseline ~0.9-1.1  - suspect 2/2 UTI and prerenal from anemia  - avoid nephrotoxins, renally dose meds  - management of UTI/anemia as noted above    Dysphagia  - appears to be acute on chronic issue  - failed swallow study in the ED  - keep NPO  - SLP consulted     Chronic bilateral pleural effusions  - no acute issues  - CXR stable  - continue lasix when able to safely swallow     Severe late onset Alzheimer's dementia without behavioral disturbance,  psychotic disturbance, mood disturbance, or anxiety  - more confused due to UTI  - oriented to person only on exam (baseline), no focal deficits   - delirium precautions  - continue aricept and namenda once able to safely swallow    Chronic pulmonary embolism without acute cor pulmonale  - hold eliquis given anemia and possible GIB    Primary hypertension  - hold lisinopril until able to safely swallow  - use PRN IV hydralazine for now      VTE Risk Mitigation (From admission, onward)           Ordered     Reason for No Pharmacological VTE Prophylaxis  Once        Question Answer Comment   Reasons: Already adequately anticoagulated on oral Anticoagulants    Reasons: Active Bleeding        12/16/23 2223     IP VTE HIGH RISK PATIENT  Once         12/16/23 2223                                    Julianna Corbin PA-C  Department of Hospital Medicine  [facility]

## 2024-02-06 ENCOUNTER — OFFICE VISIT (OUTPATIENT)
Dept: NEPHROLOGY | Facility: CLINIC | Age: 49
End: 2024-02-06
Payer: OTHER GOVERNMENT

## 2024-02-06 ENCOUNTER — HOSPITAL ENCOUNTER (OUTPATIENT)
Dept: RADIOLOGY | Facility: HOSPITAL | Age: 49
Discharge: HOME | End: 2024-02-06
Attending: INTERNAL MEDICINE
Payer: OTHER GOVERNMENT

## 2024-02-06 VITALS
HEIGHT: 63 IN | OXYGEN SATURATION: 97 % | HEART RATE: 76 BPM | WEIGHT: 134.94 LBS | BODY MASS INDEX: 23.91 KG/M2 | DIASTOLIC BLOOD PRESSURE: 84 MMHG | SYSTOLIC BLOOD PRESSURE: 136 MMHG

## 2024-02-06 DIAGNOSIS — N05.0 MINIMAL CHANGE DISEASE: Primary | ICD-10-CM

## 2024-02-06 DIAGNOSIS — R80.9 NEPHROTIC RANGE PROTEINURIA: ICD-10-CM

## 2024-02-06 DIAGNOSIS — N05.0 MINIMAL CHANGE DISEASE: ICD-10-CM

## 2024-02-06 DIAGNOSIS — R10.9 LEFT FLANK PAIN: ICD-10-CM

## 2024-02-06 PROCEDURE — 76770 US EXAM ABDO BACK WALL COMP: CPT | Mod: 26,,, | Performed by: RADIOLOGY

## 2024-02-06 PROCEDURE — 76770 US EXAM ABDO BACK WALL COMP: CPT | Mod: TC,PO

## 2024-02-06 PROCEDURE — 99213 OFFICE O/P EST LOW 20 MIN: CPT | Mod: PBBFAC,PN | Performed by: INTERNAL MEDICINE

## 2024-02-06 PROCEDURE — 99214 OFFICE O/P EST MOD 30 MIN: CPT | Mod: S$PBB,,, | Performed by: INTERNAL MEDICINE

## 2024-02-06 PROCEDURE — 99999 PR PBB SHADOW E&M-EST. PATIENT-LVL III: CPT | Mod: PBBFAC,,, | Performed by: INTERNAL MEDICINE

## 2024-02-06 RX ORDER — IBUPROFEN 100 MG/5ML
1000 SUSPENSION, ORAL (FINAL DOSE FORM) ORAL DAILY
COMMUNITY

## 2024-02-06 RX ORDER — VITAMIN E 268 MG
400 CAPSULE ORAL DAILY
COMMUNITY

## 2024-02-06 RX ORDER — MULTIVITAMIN
1 TABLET ORAL DAILY
COMMUNITY

## 2024-02-06 NOTE — PROGRESS NOTES
"    Subjective:       Patient ID: Giles Bravo is a 48 y.o.  female who presents for return patient evaluation for chronic renal failure.      She has no uremic or urinary symptoms and is in her usual state of health.  There have been no recent illnesses, hospitalizations or procedures.  Three days ago she had some L flank pain with no other symptoms.      Review of Systems   Constitutional:  Negative for fatigue.   HENT:  Negative for congestion.    Respiratory:  Negative for shortness of breath.    Cardiovascular:  Negative for leg swelling.   Gastrointestinal:  Negative for diarrhea and nausea.   Genitourinary:  Negative for decreased urine volume, difficulty urinating and dysuria.   Musculoskeletal:  Negative for arthralgias.   Neurological:  Negative for headaches.   Psychiatric/Behavioral:  Negative for confusion.        The past medical, family and social histories were reviewed for this encounter.     /84 (BP Location: Right arm, Patient Position: Sitting, BP Method: Medium (Manual))   Pulse 76   Ht 5' 3" (1.6 m)   Wt 61.2 kg (134 lb 14.7 oz)   LMP  (LMP Unknown)   SpO2 97%   BMI 23.90 kg/m²     Objective:      Physical Exam  Vitals reviewed.   Constitutional:       General: She is not in acute distress.  HENT:      Head: Normocephalic and atraumatic.   Eyes:      General: No scleral icterus.  Neck:      Vascular: No JVD.   Cardiovascular:      Rate and Rhythm: Normal rate and regular rhythm.      Heart sounds: No murmur heard.  Pulmonary:      Effort: Pulmonary effort is normal.      Breath sounds: Normal breath sounds.   Abdominal:      General: There is no distension.      Palpations: Abdomen is soft.   Skin:     General: Skin is warm and dry.   Neurological:      Mental Status: She is alert and oriented to person, place, and time.         Assessment:       1. Minimal change disease    2. Nephrotic range proteinuria    3. Left flank pain        Lab Results   Component Value Date "    CREATININE 0.7 08/10/2023    BUN 12 08/10/2023     08/10/2023    K 3.9 08/10/2023     08/10/2023    CO2 22 (L) 08/10/2023     Lab Results   Component Value Date    CALCIUM 9.7 08/10/2023    PHOS 3.1 08/07/2023     Lab Results   Component Value Date    HCT 41.5 08/10/2023     Prot/Creat Ratio, Urine   Date Value Ref Range Status   08/07/2023 0.10 0.00 - 0.20 Final   09/08/2022 Unable to calculate 0.00 - 0.20 Final   02/03/2022 Unable to calculate 0.00 - 0.20 Final     Plan:   Return to clinic in 12 months.  Labs for next visit include rp, upc in [place] q 6 mo per standing orders.  RP ua UPC US this week in [place].    Baseline creatinine is 0.7.  UPC is negative.  Albumin is 4.3.  She is s/p rituxan for a frequently relapsing steroid dependent [facility]OSSE patient.  She is off of steroids and is doing great!

## 2024-02-13 ENCOUNTER — HOSPITAL ENCOUNTER (OUTPATIENT)
Dept: RADIOLOGY | Facility: HOSPITAL | Age: 49
Discharge: HOME | End: 2024-02-13
Attending: NURSE PRACTITIONER
Payer: OTHER GOVERNMENT

## 2024-02-13 ENCOUNTER — OFFICE VISIT (OUTPATIENT)
Dept: FAMILY MEDICINE | Facility: CLINIC | Age: 49
End: 2024-02-13
Payer: OTHER GOVERNMENT

## 2024-02-13 VITALS
SYSTOLIC BLOOD PRESSURE: 126 MMHG | BODY MASS INDEX: 23.7 KG/M2 | OXYGEN SATURATION: 98 % | DIASTOLIC BLOOD PRESSURE: 86 MMHG | TEMPERATURE: 97 F | HEART RATE: 79 BPM | WEIGHT: 133.81 LBS

## 2024-02-13 DIAGNOSIS — R05.9 COUGH, UNSPECIFIED TYPE: ICD-10-CM

## 2024-02-13 DIAGNOSIS — Z77.120 MOLD EXPOSURE: ICD-10-CM

## 2024-02-13 DIAGNOSIS — R05.9 COUGH, UNSPECIFIED TYPE: Primary | ICD-10-CM

## 2024-02-13 PROCEDURE — 99999 PR PBB SHADOW E&M-EST. PATIENT-LVL V: CPT | Mod: PBBFAC,,, | Performed by: NURSE PRACTITIONER

## 2024-02-13 PROCEDURE — 71046 X-RAY EXAM CHEST 2 VIEWS: CPT | Mod: TC,PO

## 2024-02-13 PROCEDURE — 99215 OFFICE O/P EST HI 40 MIN: CPT | Mod: PBBFAC,25,PO | Performed by: NURSE PRACTITIONER

## 2024-02-13 PROCEDURE — 99213 OFFICE O/P EST LOW 20 MIN: CPT | Mod: S$PBB,,, | Performed by: NURSE PRACTITIONER

## 2024-02-13 PROCEDURE — 71046 X-RAY EXAM CHEST 2 VIEWS: CPT | Mod: 26,,, | Performed by: RADIOLOGY

## 2024-02-13 RX ORDER — AMOXICILLIN AND CLAVULANATE POTASSIUM 875; 125 MG/1; MG/1
1 TABLET, FILM COATED ORAL EVERY 12 HOURS
Qty: 10 TABLET | Refills: 0 | Status: SHIPPED | OUTPATIENT
Start: 2024-02-13 | End: 2024-02-18

## 2024-02-13 NOTE — PROGRESS NOTES
"Subjective     Patient ID: Giles Bravo is a 48 y.o. female.    Chief Complaint: Cough (Mold. )    Cough  This is a new problem. The current episode started 1 to 4 weeks ago (pt went to urgent care on yesterday). The problem has been unchanged. The problem occurs every few minutes. The cough is Non-productive. Associated symptoms include postnasal drip. Pertinent negatives include no chest pain, chills, ear congestion, ear pain, fever, headaches, heartburn, hemoptysis, myalgias, nasal congestion, rash, rhinorrhea, sore throat, shortness of breath, sweats, weight loss or wheezing. Nothing (Pt states, "it started after I saw mold. I want a mold test and an abx. I got this one (shows augmentin on phone) when this happened before and it worked." Declines COVID-19/influenza testing) aggravates the symptoms. Risk factors: Mold exposure per pt report. She has tried nothing (Prescribed cough medication at urgent care on yesterday; states did not  from pharmacy) for the symptoms. The treatment provided no relief. There is no history of asthma, bronchiectasis, bronchitis, COPD, emphysema, environmental allergies or pneumonia.     Past Medical History:   Diagnosis Date    Diabetes mellitus     recent diagnosis    Fatty liver     GERD (gastroesophageal reflux disease)     Helicobacter pylori (H. pylori) infection 5/17/2019    Hepatomegaly     Hypothyroid     Minimal change disease     Dr. Panchal    Pleural effusion on right 10/21/2019    Severe malnutrition 5/11/2019     Social History     Socioeconomic History    Marital status:    Tobacco Use    Smoking status: Never    Smokeless tobacco: Never   Substance and Sexual Activity    Alcohol use: Not Currently    Drug use: Never     Past Surgical History:   Procedure Laterality Date    COLONOSCOPY N/A 12/1/2021    Procedure: COLONOSCOPY;  Surgeon: Дмитрий Johnson MD;  Location[facility];  Service: Endoscopy;  Laterality: N/A;    COSMETIC SURGERY      " ESOPHAGOGASTRODUODENOSCOPY N/A 5/13/2019    Procedure: EGD (ESOPHAGOGASTRODUODENOSCOPY);  Surgeon: Bola Snyder MD;  Location: [facility];  Service: Endoscopy;  Laterality: N/A; positive for h pylori    ESOPHAGOGASTRODUODENOSCOPY N/A 10/24/2019    Procedure: EGD (ESOPHAGOGASTRODUODENOSCOPY);  Surgeon: Matt Sanchez MD;  Location: [facility];  Service: Endoscopy;  Laterality: N/A; gastritis; biopsy: stomach- MILD CHRONIC INACTIVE GASTRITIS AND INTESTINAL METAPLASIA; erosion; NEGATIVE FOR HELICOBACTER PYLORI; duodenum WNL    ESOPHAGOGASTRODUODENOSCOPY N/A 12/1/2021    Procedure: EGD (ESOPHAGOGASTRODUODENOSCOPY);  Surgeon: Дмитрий Johnson MD;  Location: [facility];  Service: Endoscopy;  Laterality: N/A;    RENAL BIOPSY      May 2019 at [facility], path sent to [facility]       Review of Systems   Constitutional: Negative.  Negative for chills, fever and weight loss.   HENT:  Positive for postnasal drip. Negative for ear pain, rhinorrhea and sore throat.    Eyes: Negative.    Respiratory:  Positive for cough. Negative for hemoptysis, shortness of breath and wheezing.    Cardiovascular: Negative.  Negative for chest pain.   Gastrointestinal: Negative.  Negative for heartburn.   Endocrine: Negative.    Genitourinary: Negative.    Musculoskeletal: Negative.  Negative for myalgias.   Integumentary:  Negative for rash. Negative.   Allergic/Immunologic: Negative.  Negative for environmental allergies.   Neurological: Negative.  Negative for headaches.   Psychiatric/Behavioral: Negative.            Objective     Physical Exam  Vitals and nursing note reviewed.   Constitutional:       Appearance: Normal appearance.   HENT:      Head: Normocephalic.      Right Ear: Tympanic membrane, ear canal and external ear normal.      Left Ear: Tympanic membrane, ear canal and external ear normal.      Nose: Nose normal.      Mouth/Throat:      Mouth: Mucous membranes are moist.      Pharynx: Oropharynx is clear.   Eyes:       Conjunctiva/sclera: Conjunctivae normal.      Pupils: Pupils are equal, round, and reactive to light.   Cardiovascular:      Rate and Rhythm: Normal rate and regular rhythm.      Pulses: Normal pulses.      Heart sounds: Normal heart sounds.   Pulmonary:      Effort: Pulmonary effort is normal.      Breath sounds: Normal breath sounds.   Abdominal:      General: Bowel sounds are normal.      Palpations: Abdomen is soft.   Musculoskeletal:         General: Normal range of motion.      Cervical back: Normal range of motion and neck supple.   Skin:     General: Skin is warm and dry.      Capillary Refill: Capillary refill takes 2 to 3 seconds.   Neurological:      Mental Status: She is alert and oriented to person, place, and time.   Psychiatric:         Mood and Affect: Mood normal.         Behavior: Behavior normal.         Thought Content: Thought content normal.         Judgment: Judgment normal.            Assessment and Plan     1. Cough, unspecified type  -     X-Ray Chest PA And Lateral; Future; Expected date: 02/13/2024  -     amoxicillin-clavulanate 875-125mg (AUGMENTIN) 875-125 mg per tablet; Take 1 tablet by mouth every 12 (twelve) hours. for 5 days  Dispense: 10 tablet; Refill: 0  Hydrate well  Rest   Pulmonology recommended if persisting  Cough medication as prescribed by urgent care  Report to ER immediately if symptoms worsen  or persist    2. Mold exposure  -     FUNGUS CULTURE, BLOOD OR BONE MARROW                     No follow-ups on file.

## 2024-02-13 NOTE — PATIENT INSTRUCTIONS
Hydrate well  Rest   Pulmonology recommended if persisting  Cough medication as prescribed by urgent care  Report to ER immediately if symptoms worsen  or persist    Afshin Skaggs,     If you are due for any health screening(s) below please notify me so we can arrange them to be ordered and scheduled. Most healthy patients at your age complete them, but you are free to accept or refuse.     If you can't do it, I'll definitely understand. If you can, I'd certainly appreciate it!    Tests to Keep You Healthy    Mammogram: Met on 2/17/2023  Eye Exam: DUE  Colon Cancer Screening: Met on 12/1/2021  Cervical Cancer Screening: Met on 1/15/2021  Last HbA1c < 8 (02/06/2024): Yes      Your diabetic retinal eye exam is due     Diabetes is the #1 cause of blindness in the [place] - early detection before signs or symptoms develop can prevent debilitating blindness.     Our records indicate that you may be overdue for your annual diabetic eye exam. Eye screening can help identify patients at risk for developing vision loss which is common in diabetes. This simple screening is an important step to keeping you healthy and preventing complications from diabetes.     This recommended diabetic eye exam should take place once per year and can prevent and treat diabetes complications in the eye before developing symptoms. This can be done with a special camera is used to take photographs of the back of your eye without having to dilate them, or you can see an eye doctor for a full dilated exam.     If you recently had your yearly diabetic eye exam performed outside of [facility], please let your Health care team know so that they can update your health record.

## 2024-02-14 ENCOUNTER — TELEPHONE (OUTPATIENT)
Dept: FAMILY MEDICINE | Facility: CLINIC | Age: 49
End: 2024-02-14
Payer: OTHER GOVERNMENT

## 2024-02-14 NOTE — TELEPHONE ENCOUNTER
----- Message from Aimee Barrientos sent at 2/14/2024  9:29 AM CST -----  Type:  Patient Returning Call    Who Called:pt  Who Left Message for Patient:nurse  Does the patient know what this is regarding?:test results/chest xray   Would the patient rather a call back or a response via [facility]? call  Best Call Back Number:[phone]  Additional Information: .    Thank you

## 2024-02-19 ENCOUNTER — TELEPHONE (OUTPATIENT)
Dept: NEPHROLOGY | Facility: CLINIC | Age: 49
End: 2024-02-19
Payer: OTHER GOVERNMENT

## 2024-02-19 DIAGNOSIS — E11.9 TYPE 2 DIABETES MELLITUS WITHOUT COMPLICATION, WITHOUT LONG-TERM CURRENT USE OF INSULIN: ICD-10-CM

## 2024-02-19 RX ORDER — METFORMIN HYDROCHLORIDE 1000 MG/1
TABLET ORAL
Qty: 90 TABLET | Refills: 0 | Status: SHIPPED | OUTPATIENT
Start: 2024-02-19

## 2024-02-19 NOTE — TELEPHONE ENCOUNTER
Provider Staff:  Action required for this patient     Please see care gap opportunities below in Care Due Message:   Annual office visit due 5/4/24    Thanks!  [facility]     Appointments      Date Provider   Last Visit   2/9/2023 Ginna Juarez MD   Next Visit   Visit date not found Ginna Juarez MD     Refill Decision Note   Giles Bravo  is requesting a refill authorization.  Brief Assessment and Rationale for Refill:  Approve     Medication Therapy Plan:         Comments:     Note composed:3:10 PM 02/19/2024

## 2024-02-19 NOTE — TELEPHONE ENCOUNTER
----- Message from Pravin Newberry MD sent at 2/7/2024  4:55 AM CST -----  It looked like you may have a small stone on the left kidney.  Let me know if that pain starts up again.  It is such a small stone I would not expect for it to be causing symptoms.

## 2024-02-19 NOTE — TELEPHONE ENCOUNTER
Care Due:                  Date            Visit Type   Department     Provider  --------------------------------------------------------------------------------                                EP -                              PRIMARY      [facility] FAMILY  Last Visit: 02-      CARE ([facility])   MEDICINE       Ginna Juarez  Next Visit: None Scheduled  None         None Found                                                            Last  Test          Frequency    Reason                     Performed    Due Date  --------------------------------------------------------------------------------    Office Visit  15 months..  metFORMIN................  02- 05-    [facility] Embedded Care Due Messages. Reference number: [id number].   2/19/2024 11:11:24 AM CST

## 2024-03-20 ENCOUNTER — OFFICE VISIT (OUTPATIENT)
Dept: FAMILY MEDICINE | Facility: CLINIC | Age: 49
End: 2024-03-20
Payer: OTHER GOVERNMENT

## 2024-03-20 VITALS
TEMPERATURE: 99 F | DIASTOLIC BLOOD PRESSURE: 80 MMHG | HEART RATE: 106 BPM | WEIGHT: 132 LBS | HEIGHT: 63 IN | SYSTOLIC BLOOD PRESSURE: 130 MMHG | BODY MASS INDEX: 23.39 KG/M2

## 2024-03-20 DIAGNOSIS — E11.9 TYPE 2 DIABETES MELLITUS WITHOUT COMPLICATION, WITHOUT LONG-TERM CURRENT USE OF INSULIN: ICD-10-CM

## 2024-03-20 DIAGNOSIS — L30.9 DERMATITIS: Primary | ICD-10-CM

## 2024-03-20 DIAGNOSIS — E03.9 HYPOTHYROIDISM, UNSPECIFIED TYPE: ICD-10-CM

## 2024-03-20 DIAGNOSIS — N05.0 MINIMAL CHANGE DISEASE: ICD-10-CM

## 2024-03-20 DIAGNOSIS — Z12.31 ENCOUNTER FOR SCREENING MAMMOGRAM FOR BREAST CANCER: ICD-10-CM

## 2024-03-20 PROCEDURE — 99214 OFFICE O/P EST MOD 30 MIN: CPT | Mod: PBBFAC,PO | Performed by: INTERNAL MEDICINE

## 2024-03-20 PROCEDURE — 99999 PR PBB SHADOW E&M-EST. PATIENT-LVL IV: CPT | Mod: PBBFAC,,, | Performed by: INTERNAL MEDICINE

## 2024-03-20 PROCEDURE — 99214 OFFICE O/P EST MOD 30 MIN: CPT | Mod: S$PBB,,, | Performed by: INTERNAL MEDICINE

## 2024-03-20 RX ORDER — ZINC GLUCONATE 50 MG
50 TABLET ORAL DAILY
COMMUNITY

## 2024-03-20 RX ORDER — CLOBETASOL PROPIONATE 0.5 MG/G
CREAM TOPICAL 2 TIMES DAILY
Qty: 30 G | Refills: 1 | Status: SHIPPED | OUTPATIENT
Start: 2024-03-20 | End: 2024-04-07

## 2024-03-20 RX ORDER — CHOLECALCIFEROL (VITAMIN D3) 25 MCG
1000 TABLET ORAL DAILY
COMMUNITY

## 2024-04-01 ENCOUNTER — TELEPHONE (OUTPATIENT)
Dept: NEPHROLOGY | Facility: CLINIC | Age: 49
End: 2024-04-01
Payer: OTHER GOVERNMENT

## 2024-04-01 DIAGNOSIS — R10.2 PELVIC PAIN: ICD-10-CM

## 2024-04-01 DIAGNOSIS — R39.198 SLOW URINARY STREAM: Primary | ICD-10-CM

## 2024-04-01 NOTE — TELEPHONE ENCOUNTER
She was advised to go to ER/Urgent Care.    We can get an US as well.  She does have a h/o kidney stones.

## 2024-04-01 NOTE — TELEPHONE ENCOUNTER
----- Message from Aye Blanchard sent at 4/1/2024  8:59 AM CDT -----  Regarding: Needs return call  Type:  Same Day Appointment Request    Caller is requesting a same day appointment.  Caller declined first available appointment listed below.      Name of Caller:    When is the first available appointment?  Dept book  Symptoms:  urinary problems  Best Call Back Number:  [phone]    Additional Information:   They would like to be seen today by dr edwards regarding a urinary issue she is having please call to stacey

## 2024-04-01 NOTE — TELEPHONE ENCOUNTER
Abdominal pelvic pain to the left side.  Slow stream. Fatigue. No back pain, No N&V.  No blood in the urine.     Unable to schedule same day appt at her PCP, but can schedule for tomorrow.     She would like diagnostics/imaging.

## 2024-04-01 NOTE — TELEPHONE ENCOUNTER
Dr Newberry said she could have an ultrasound.   Scheduled for tomorrow for pelvic/abd pain.     Advised to go to ER with N&V, worsening pain, or any other s/s.      Patient  voiced understanding.     Patient vomited just before we hung up and advise  she should go to hospital for stone workup.     He  declined and said they would go to ultrasound and see how she is.

## 2024-04-02 ENCOUNTER — HOSPITAL ENCOUNTER (OUTPATIENT)
Dept: RADIOLOGY | Facility: HOSPITAL | Age: 49
Discharge: HOME | End: 2024-04-02
Attending: NURSE PRACTITIONER
Payer: OTHER GOVERNMENT

## 2024-04-02 ENCOUNTER — OFFICE VISIT (OUTPATIENT)
Dept: FAMILY MEDICINE | Facility: CLINIC | Age: 49
End: 2024-04-02
Payer: OTHER GOVERNMENT

## 2024-04-02 VITALS
HEIGHT: 63 IN | WEIGHT: 128.19 LBS | OXYGEN SATURATION: 97 % | HEART RATE: 72 BPM | DIASTOLIC BLOOD PRESSURE: 71 MMHG | TEMPERATURE: 97 F | SYSTOLIC BLOOD PRESSURE: 125 MMHG | BODY MASS INDEX: 22.71 KG/M2

## 2024-04-02 DIAGNOSIS — R10.32 DISCOMFORT OF LEFT GROIN: ICD-10-CM

## 2024-04-02 DIAGNOSIS — E11.9 TYPE 2 DIABETES MELLITUS WITHOUT COMPLICATION, WITHOUT LONG-TERM CURRENT USE OF INSULIN: Primary | ICD-10-CM

## 2024-04-02 DIAGNOSIS — E11.65 UNCONTROLLED TYPE 2 DIABETES MELLITUS WITH HYPERGLYCEMIA: ICD-10-CM

## 2024-04-02 DIAGNOSIS — R19.5 ABNORMAL STOOLS: ICD-10-CM

## 2024-04-02 DIAGNOSIS — R93.429 ABNORMAL ULTRASOUND OF KIDNEY: ICD-10-CM

## 2024-04-02 LAB
BILIRUB UR QL STRIP: NEGATIVE
CLARITY UR: CLEAR
COLOR UR: YELLOW
GLUCOSE UR QL STRIP: NEGATIVE
HGB UR QL STRIP: ABNORMAL
KETONES UR QL STRIP: NEGATIVE
LEUKOCYTE ESTERASE UR QL STRIP: NEGATIVE
NITRITE UR QL STRIP: NEGATIVE
PH UR STRIP: 6 [PH] (ref 5–8)
PROT UR QL STRIP: NEGATIVE
SP GR UR STRIP: 1.01 (ref 1–1.03)
URN SPEC COLLECT METH UR: ABNORMAL

## 2024-04-02 PROCEDURE — 74019 RADEX ABDOMEN 2 VIEWS: CPT | Mod: 26,,, | Performed by: RADIOLOGY

## 2024-04-02 PROCEDURE — 81002 URINALYSIS NONAUTO W/O SCOPE: CPT | Mod: PO | Performed by: NURSE PRACTITIONER

## 2024-04-02 PROCEDURE — 99215 OFFICE O/P EST HI 40 MIN: CPT | Mod: PBBFAC,25,PO | Performed by: NURSE PRACTITIONER

## 2024-04-02 PROCEDURE — 74019 RADEX ABDOMEN 2 VIEWS: CPT | Mod: TC,PO

## 2024-04-02 PROCEDURE — 99214 OFFICE O/P EST MOD 30 MIN: CPT | Mod: S$PBB,,, | Performed by: NURSE PRACTITIONER

## 2024-04-02 PROCEDURE — 99999 PR PBB SHADOW E&M-EST. PATIENT-LVL V: CPT | Mod: PBBFAC,,, | Performed by: NURSE PRACTITIONER

## 2024-04-02 NOTE — PATIENT INSTRUCTIONS
F/U with nephrology as advised  Continue current medications  RTC as needed  Consider GI for abnormal stools  Report to ER immediately if symptoms worsen or persist    Afshin Skaggs,     If you are due for any health screening(s) below please notify me so we can arrange them to be ordered and scheduled. Most healthy patients at your age complete them, but you are free to accept or refuse.     If you can't do it, I'll definitely understand. If you can, I'd certainly appreciate it!    Tests to Keep You Healthy    Mammogram: ORDERED BUT NOT SCHEDULED  Eye Exam: DUE  Colon Cancer Screening: Met on 12/1/2021  Cervical Cancer Screening: Met on 1/15/2021  Last HbA1c < 8 (02/06/2024): Yes      Schedule your breast cancer screening today     Breast cancer is the second most common cancer in women,  and the second leading cause of death from cancer. Mammograms can detect breast cancer early, which significantly increases the chances of curing the cancer.       Our records indicate that you may be overdue for breast cancer screening. Cancer screenings save lives, so schedule yours today to stay healthy.     If you recently had a mammogram performed outside of [facility], please let your Health care team know so that they can update your health record.        Your diabetic retinal eye exam is due     Diabetes is the #1 cause of blindness in the [place] - early detection before signs or symptoms develop can prevent debilitating blindness.     Our records indicate that you may be overdue for your annual diabetic eye exam. Eye screening can help identify patients at risk for developing vision loss which is common in diabetes. This simple screening is an important step to keeping you healthy and preventing complications from diabetes.     This recommended diabetic eye exam should take place once per year and can prevent and treat diabetes complications in the eye before developing symptoms. This can be done with a special camera  is used to take photographs of the back of your eye without having to dilate them, or you can see an eye doctor for a full dilated exam.     If you recently had your yearly diabetic eye exam performed outside of [facility], please let your Health care team know so that they can update your health record.

## 2024-04-02 NOTE — PROGRESS NOTES
"Subjective     Patient ID: Giles Bravo is a 49 y.o. female.    Chief Complaint: Abdominal Pain    Follow-up  This is a new (Pt states had US kidney done on yesterday due to left groin discomfort; states ordered by nephrology) problem. The current episode started in the past 7 days. The problem occurs daily. The problem has been unchanged. Associated symptoms include nausea. Pertinent negatives include no abdominal pain, anorexia, arthralgias, change in bowel habit, chest pain, chills, congestion, coughing, diaphoresis, fatigue, fever, headaches, joint swelling, myalgias, neck pain, numbness, rash, sore throat, swollen glands, urinary symptoms, vertigo, visual change, vomiting or weakness. Nothing aggravates the symptoms. She has tried nothing (Flomax prescribed by nephrologist following US findings; pt states has not gotten medication from pharmacy. Pt states, "the nurse told me to see my doctor to get a sooner appt with Dr. Newberry.") for the symptoms. The treatment provided no relief.   Pt also states, "Dr. Newberry said to follow up with my doctor about my labs." RFP ordered 2/6/24 by nephrologist showed ; hgb A1c on 2/6/24 7.5. Pt states compliance with metformin; states, "my  says I've been eating too much sugar, so I've changed that." Pt also states, "my poo doesn't look right. It looks small"; states over the past few weeks; denies abdominal pain. Pt has no other complaints today.  Past Medical History:   Diagnosis Date    Diabetes mellitus     recent diagnosis    Fatty liver     GERD (gastroesophageal reflux disease)     Helicobacter pylori (H. pylori) infection 5/17/2019    Hepatomegaly     Hypothyroid     Minimal change disease     Dr. Panchal    Pleural effusion on right 10/21/2019    Severe malnutrition 5/11/2019     Social History     Socioeconomic History    Marital status:    Tobacco Use    Smoking status: Never    Smokeless tobacco: Never   Substance and Sexual Activity "    Alcohol use: Not Currently    Drug use: Never     Past Surgical History:   Procedure Laterality Date    COLONOSCOPY N/A 12/1/2021    Procedure: COLONOSCOPY;  Surgeon: Дмитрий Johnson MD;  Location: [facility];  Service: Endoscopy;  Laterality: N/A;    COSMETIC SURGERY      ESOPHAGOGASTRODUODENOSCOPY N/A 5/13/2019    Procedure: EGD (ESOPHAGOGASTRODUODENOSCOPY);  Surgeon: Bola Snyder MD;  Location: [facility];  Service: Endoscopy;  Laterality: N/A; positive for h pylori    ESOPHAGOGASTRODUODENOSCOPY N/A 10/24/2019    Procedure: EGD (ESOPHAGOGASTRODUODENOSCOPY);  Surgeon: Matt Sanchez MD;  Location: [facility];  Service: Endoscopy;  Laterality: N/A; gastritis; biopsy: stomach- MILD CHRONIC INACTIVE GASTRITIS AND INTESTINAL METAPLASIA; erosion; NEGATIVE FOR HELICOBACTER PYLORI; duodenum WNL    ESOPHAGOGASTRODUODENOSCOPY N/A 12/1/2021    Procedure: EGD (ESOPHAGOGASTRODUODENOSCOPY);  Surgeon: Дмитрий Johnson MD;  Location: [facility];  Service: Endoscopy;  Laterality: N/A;    RENAL BIOPSY      May 2019 at [facility], path sent to [facility]       Review of Systems   Constitutional: Negative.  Negative for chills, diaphoresis, fatigue and fever.   HENT: Negative.  Negative for nasal congestion and sore throat.    Eyes: Negative.    Respiratory: Negative.  Negative for cough.    Cardiovascular: Negative.  Negative for chest pain.   Gastrointestinal:  Positive for nausea. Negative for abdominal pain, anorexia, change in bowel habit and vomiting.   Endocrine: Negative.    Genitourinary:         Left groin pain/hydronephrosis   Musculoskeletal: Negative.  Negative for arthralgias, joint swelling, myalgias and neck pain.   Integumentary:  Negative for rash. Negative.   Allergic/Immunologic: Negative.    Neurological: Negative.  Negative for vertigo, weakness, numbness and headaches.   Psychiatric/Behavioral: Negative.            Objective     Physical Exam  Vitals and nursing note reviewed.   Constitutional:        Appearance: Normal appearance.   HENT:      Head: Normocephalic.      Right Ear: Tympanic membrane, ear canal and external ear normal.      Left Ear: Tympanic membrane, ear canal and external ear normal.      Nose: Nose normal.      Mouth/Throat:      Mouth: Mucous membranes are moist.      Pharynx: Oropharynx is clear.   Eyes:      Conjunctiva/sclera: Conjunctivae normal.      Pupils: Pupils are equal, round, and reactive to light.   Cardiovascular:      Rate and Rhythm: Normal rate and regular rhythm.      Pulses: Normal pulses.      Heart sounds: Normal heart sounds.   Pulmonary:      Effort: Pulmonary effort is normal.      Breath sounds: Normal breath sounds.   Abdominal:      General: Bowel sounds are normal.      Palpations: Abdomen is soft.   Musculoskeletal:         General: Normal range of motion.      Cervical back: Normal range of motion and neck supple.   Skin:     General: Skin is warm and dry.      Capillary Refill: Capillary refill takes 2 to 3 seconds.   Neurological:      Mental Status: She is alert and oriented to person, place, and time.   Psychiatric:         Mood and Affect: Mood normal.         Behavior: Behavior normal.         Thought Content: Thought content normal.         Judgment: Judgment normal.          Assessment and Plan   Giles was seen today for abdominal pain.    Diagnoses and all orders for this visit:    Type 2 diabetes mellitus without complication, without long-term current use of insulin  Uncontrolled type 2 diabetes mellitus with hyperglycemia  -     HEMOGLOBIN A1C; Future  -     Glucose, Fasting; Future    Abnormal ultrasound of kidney  Discomfort of left groin  -     Ambulatory referral/consult to Nephrology; Future  -     Urinalysis  -     Urine culture; Future    Abnormal stools  -     X-Ray Abdomen Flat And Erect; Future                   No follow-ups on file.

## 2024-04-03 ENCOUNTER — OFFICE VISIT (OUTPATIENT)
Dept: NEPHROLOGY | Facility: CLINIC | Age: 49
End: 2024-04-03
Payer: OTHER GOVERNMENT

## 2024-04-03 VITALS — HEART RATE: 89 BPM | BODY MASS INDEX: 22.73 KG/M2 | WEIGHT: 128.31 LBS | OXYGEN SATURATION: 97 % | HEIGHT: 63 IN

## 2024-04-03 DIAGNOSIS — R80.9 NEPHROTIC RANGE PROTEINURIA: ICD-10-CM

## 2024-04-03 DIAGNOSIS — N05.0 MINIMAL CHANGE DISEASE: Primary | ICD-10-CM

## 2024-04-03 PROCEDURE — 99999 PR PBB SHADOW E&M-EST. PATIENT-LVL III: CPT | Mod: PBBFAC,,, | Performed by: INTERNAL MEDICINE

## 2024-04-03 PROCEDURE — 99214 OFFICE O/P EST MOD 30 MIN: CPT | Mod: S$PBB,,, | Performed by: INTERNAL MEDICINE

## 2024-04-03 PROCEDURE — 99213 OFFICE O/P EST LOW 20 MIN: CPT | Mod: PBBFAC,PN | Performed by: INTERNAL MEDICINE

## 2024-04-03 RX ORDER — CEFUROXIME AXETIL 250 MG/1
250 TABLET ORAL EVERY 12 HOURS
Qty: 14 TABLET | Refills: 0 | Status: SHIPPED | OUTPATIENT
Start: 2024-04-03

## 2024-04-03 RX ORDER — TAMSULOSIN HYDROCHLORIDE 0.4 MG/1
0.4 CAPSULE ORAL DAILY
Qty: 30 CAPSULE | Refills: 0 | Status: SHIPPED | OUTPATIENT
Start: 2024-04-03 | End: 2025-04-03

## 2024-04-03 NOTE — PROGRESS NOTES
"  Subjective:       Patient ID: Giles Bravo is a 49 y.o.  female who presents for return patient evaluation for chronic renal failure.      She has been having some L flank and then L inguinal pain which has now resolved.  She also states that her urine tuned darker for a couple of days.  She has been drinking more fluids as well.      Review of Systems   Constitutional:  Negative for fatigue.   HENT:  Negative for congestion.    Respiratory:  Negative for shortness of breath.    Cardiovascular:  Negative for leg swelling.   Gastrointestinal:  Negative for diarrhea and nausea.   Genitourinary:  Positive for dysuria (mild). Negative for decreased urine volume and difficulty urinating.   Musculoskeletal:  Negative for arthralgias.   Neurological:  Negative for headaches.   Psychiatric/Behavioral:  Negative for confusion.        The past medical, family and social histories were reviewed for this encounter.     Pulse 89   Ht 5' 3" (1.6 m)   Wt 58.2 kg (128 lb 4.9 oz)   LMP  (LMP Unknown)   SpO2 97%   BMI 22.73 kg/m²     Objective:      Physical Exam  Vitals reviewed.   Constitutional:       General: She is not in acute distress.  HENT:      Head: Normocephalic and atraumatic.   Eyes:      General: No scleral icterus.  Neck:      Vascular: No JVD.   Cardiovascular:      Rate and Rhythm: Normal rate and regular rhythm.      Heart sounds: No murmur heard.  Pulmonary:      Effort: Pulmonary effort is normal.      Breath sounds: Normal breath sounds.   Abdominal:      General: There is no distension.      Palpations: Abdomen is soft.   Skin:     General: Skin is warm and dry.   Neurological:      Mental Status: She is alert and oriented to person, place, and time.         Assessment:       1. Minimal change disease    2. Nephrotic range proteinuria        Lab Results   Component Value Date    CREATININE 0.9 02/06/2024    BUN 13 02/06/2024     02/06/2024    K 3.6 02/06/2024     02/06/2024    " CO2 22 (L) 02/06/2024     Lab Results   Component Value Date    CALCIUM 9.6 02/06/2024    PHOS 2.3 (L) 02/06/2024     Lab Results   Component Value Date    HCT 41.5 08/10/2023     Prot/Creat Ratio, Urine   Date Value Ref Range Status   02/06/2024 0.13 0.00 - 0.20 Final   08/07/2023 0.10 0.00 - 0.20 Final   09/08/2022 Unable to calculate 0.00 - 0.20 Final     Plan:   Return to clinic in 12 months.  Labs for next visit include rp, upc in [place] q 6 mo per standing orders.  RP ua UPC US this week in [place].    Baseline creatinine is 0.7.  UPC is negative.  Albumin is 4.3.  She is s/p rituxan for a frequently relapsing steroid dependent JOSSE patient.  She is off of steroids and is doing great!    It appears she did have a recent stone passage per her US changes.  She has some mild dysuria now.  Plan to give her ceftin and a few flomax in the event her symptoms return.

## 2024-04-07 NOTE — PROGRESS NOTES
Assessment/Plan:    Problem List Items Addressed This Visit          Renal/    Minimal change disease    Overview     -followed by nephrology  -no longer on prednisone or rituxan   -renal function stable/wnl; protein/creatinine wnl            Endocrine    Type 2 diabetes mellitus without complication, without long-term current use of insulin    Overview     Diabetes Medications               metFORMIN (GLUCOPHAGE) 1000 MG tablet Take 1 tablet (1,000 mg total) by mouth once daily at 6am.        -condition has been previously controlled but recent increase in A1c  -patient admits to dietary noncompliance and plans to change diet  -plan to continue current medications for now but if A1c still above goal with next lab check, may need to consider adding SGLT2  -see diabetic health maintenance listed below  -on statin: No-intolerant  -on ACE-I/ARB: No  -counseling provided on importance of diabetic diet and medication compliance in order to treat diabetes  -discussed diabetes disease course and potential complications         Hypothyroidism    Overview     Lab Results   Component Value Date    TSH 2.317 02/06/2024   -no longer thyroid replacement          Other Visit Diagnoses       Dermatitis    -  Primary    Encounter for screening mammogram for breast cancer        Relevant Orders    Mammo Digital Screening Sung dang/ Mikie Juarez MD  _____________________________________________________________________________________________________________________________________________________    CC: follow up of chronic medical conditions     HPI:    Patient is in clinic today as an established patient.    DM2: Patient presents for follow up of diabetes. Condition is chronic. Has been well controlled but had a recent increase in A1c on last labs. Patient reports noncompliance with diet and plans to make changes asap. Patient denies symptoms, including foot ulcerations, hyperglycemia, hypoglycemia , nausea,  paresthesia of the feet, polydipsia, polyuria and visual disturbances.  Evaluation to date has been included: fasting blood sugar, fasting lipid panel, hemoglobin A1C and microalbuminuria.  Denies adverse effects of current medications.     Diabetes Management Status    Statin: Not taking  ACE/ARB: Not taking    Screening or Prevention Patient's value Goal Complete/Controlled?   HgA1C Testing and Control   Lab Results   Component Value Date    HGBA1C 7.3 (H) 04/02/2024      Annually/Less than 8% Yes   Lipid profile : 08/10/2023 Annually Yes   LDL control Lab Results   Component Value Date    LDLCALC 104.6 08/10/2023    Annually/Less than 100 mg/dl  No   Nephropathy screening Lab Results   Component Value Date    LABMICR 48.0 02/06/2024     Lab Results   Component Value Date    PROTEINUA Negative 04/02/2024    Annually Yes   Blood pressure BP Readings from Last 1 Encounters:   04/02/24 125/71    Less than 140/90 Yes   Dilated retinal exam Most Recent Eye Exam Date: Not Found Annually Yes   Foot exam   : 02/09/2023 Annually No       No other new complaints today.  Remaining chronic conditions have been reviewed and remain stable. Further detail as stated above.     HM reviewed.     No recent changes to medical/surgical history.    Current Outpatient Medications on File Prior to Visit   Medication Sig Dispense Refill    calcium citrate (CALCITRATE) 200 mg (950 mg) tablet Take 1 tablet by mouth once daily.      metFORMIN (GLUCOPHAGE) 1000 MG tablet TAKE 1 TABLET(1000 MG) BY MOUTH DAILY WITH BREAKFAST 90 tablet 0    vitamin D (VITAMIN D3) 1000 units Tab Take 1,000 Units by mouth once daily.      zinc gluconate 50 mg tablet Take 50 mg by mouth once daily.      antiox #8/om3/dha/epa/lut/zeax (PRESERVISION AREDS 2, OMEGA-3, ORAL) Take by mouth.      ascorbic acid, vitamin C, (VITAMIN C) 1000 MG tablet Take 1,000 mg by mouth once daily.      coQ10, ubiquinol, 100 mg Cap Take by mouth.      multivit-mins  "no.63/iron/folic (M-VIT ORAL) Take by mouth.      multivitamin (THERAGRAN) per tablet Take 1 tablet by mouth once daily.      QUERCETIN DIHYDRATE, BULK, MISC by Misc.(Non-Drug; Combo Route) route.      vitamin E 400 UNIT capsule Take 400 Units by mouth once daily.       No current facility-administered medications on file prior to visit.       Review of Systems   Constitutional:  Negative for chills, diaphoresis, fatigue and fever.   HENT:  Negative for congestion, ear pain, postnasal drip, sinus pain and sore throat.    Eyes:  Negative for pain and redness.   Respiratory:  Negative for cough, chest tightness and shortness of breath.    Cardiovascular:  Negative for chest pain and leg swelling.   Gastrointestinal:  Negative for abdominal pain, constipation, diarrhea, nausea and vomiting.   Genitourinary:  Negative for dysuria and hematuria.   Musculoskeletal:  Negative for arthralgias and joint swelling.   Skin:  Negative for rash.   Neurological:  Negative for dizziness, syncope and headaches.   Psychiatric/Behavioral:  Negative for dysphoric mood. The patient is not nervous/anxious.      Vitals:    03/20/24 1459   BP: 130/80   Pulse: 106   Temp: 98.5 °F (36.9 °C)   Weight: 59.9 kg (132 lb)   Height: 5' 3" (1.6 m)       Wt Readings from Last 3 Encounters:   04/03/24 58.2 kg (128 lb 4.9 oz)   04/02/24 58.2 kg (128 lb 3.2 oz)   03/20/24 59.9 kg (132 lb)       Physical Exam  Constitutional:       General: She is not in acute distress.     Appearance: Normal appearance. She is well-developed.   HENT:      Head: Normocephalic and atraumatic.   Eyes:      Conjunctiva/sclera: Conjunctivae normal.   Cardiovascular:      Rate and Rhythm: Normal rate and regular rhythm.      Pulses: Normal pulses.      Heart sounds: Normal heart sounds. No murmur heard.  Pulmonary:      Effort: Pulmonary effort is normal. No respiratory distress.      Breath sounds: Normal breath sounds.   Abdominal:      General: Bowel sounds are normal. " There is no distension.      Palpations: Abdomen is soft.      Tenderness: There is no abdominal tenderness.   Musculoskeletal:         General: Normal range of motion.      Cervical back: Normal range of motion and neck supple.   Skin:     General: Skin is warm and dry.      Findings: No rash.   Neurological:      General: No focal deficit present.      Mental Status: She is alert and oriented to person, place, and time.   Psychiatric:         Mood and Affect: Mood normal.         Behavior: Behavior normal.     Health Maintenance   Topic Date Due    Eye Exam  Never done    Shingles Vaccine (1 of 2) Never done    Foot Exam  02/09/2024    Mammogram  02/17/2024    Lipid Panel  08/10/2024    Hemoglobin A1c  10/02/2024    TETANUS VACCINE  01/01/2026    Colorectal Cancer Screening  12/01/2031    Hepatitis C Screening  Completed

## 2024-05-06 ENCOUNTER — LAB VISIT (OUTPATIENT)
Dept: LAB | Facility: HOSPITAL | Age: 49
End: 2024-05-06
Attending: INTERNAL MEDICINE
Payer: OTHER GOVERNMENT

## 2024-05-06 DIAGNOSIS — R80.9 NEPHROTIC RANGE PROTEINURIA: ICD-10-CM

## 2024-05-06 DIAGNOSIS — N05.0 MINIMAL CHANGE DISEASE: ICD-10-CM

## 2024-05-06 LAB
ALBUMIN SERPL BCP-MCNC: 4.2 G/DL (ref 3.5–5.2)
ANION GAP SERPL CALC-SCNC: 7 MMOL/L (ref 8–16)
BUN SERPL-MCNC: 13 MG/DL (ref 6–20)
CALCIUM SERPL-MCNC: 9.6 MG/DL (ref 8.7–10.5)
CHLORIDE SERPL-SCNC: 104 MMOL/L (ref 95–110)
CO2 SERPL-SCNC: 27 MMOL/L (ref 23–29)
CREAT SERPL-MCNC: 0.8 MG/DL (ref 0.5–1.4)
CREAT UR-MCNC: 138 MG/DL (ref 15–325)
EST. GFR  (NO RACE VARIABLE): >60 ML/MIN/1.73 M^2
GLUCOSE SERPL-MCNC: 188 MG/DL (ref 70–110)
PHOSPHATE SERPL-MCNC: 3.5 MG/DL (ref 2.7–4.5)
POTASSIUM SERPL-SCNC: 3.9 MMOL/L (ref 3.5–5.1)
PROT UR-MCNC: 9 MG/DL (ref 0–15)
PROT/CREAT UR: 0.07 MG/G{CREAT} (ref 0–0.2)
SODIUM SERPL-SCNC: 138 MMOL/L (ref 136–145)

## 2024-05-06 PROCEDURE — 82570 ASSAY OF URINE CREATININE: CPT | Performed by: INTERNAL MEDICINE

## 2024-05-06 PROCEDURE — 36415 COLL VENOUS BLD VENIPUNCTURE: CPT | Mod: PO | Performed by: INTERNAL MEDICINE

## 2024-05-06 PROCEDURE — 80069 RENAL FUNCTION PANEL: CPT | Performed by: INTERNAL MEDICINE

## 2024-06-20 ENCOUNTER — LAB VISIT (OUTPATIENT)
Dept: LAB | Facility: HOSPITAL | Age: 49
End: 2024-06-20
Attending: INTERNAL MEDICINE
Payer: OTHER GOVERNMENT

## 2024-06-20 DIAGNOSIS — E11.9 TYPE 2 DIABETES MELLITUS WITHOUT COMPLICATION, WITHOUT LONG-TERM CURRENT USE OF INSULIN: ICD-10-CM

## 2024-06-20 LAB
ESTIMATED AVG GLUCOSE: 166 MG/DL (ref 68–131)
HBA1C MFR BLD: 7.4 % (ref 4–5.6)

## 2024-06-20 PROCEDURE — 36415 COLL VENOUS BLD VENIPUNCTURE: CPT | Mod: PO | Performed by: INTERNAL MEDICINE

## 2024-06-20 PROCEDURE — 83036 HEMOGLOBIN GLYCOSYLATED A1C: CPT | Performed by: INTERNAL MEDICINE

## 2024-09-18 DIAGNOSIS — E11.9 TYPE 2 DIABETES MELLITUS WITHOUT COMPLICATION: ICD-10-CM

## 2024-10-03 DIAGNOSIS — E11.9 TYPE 2 DIABETES MELLITUS WITHOUT COMPLICATION, WITHOUT LONG-TERM CURRENT USE OF INSULIN: ICD-10-CM

## 2024-10-03 NOTE — TELEPHONE ENCOUNTER
Refill Routing Note   Medication(s) are not appropriate for processing by [facility] for the following reason(s):        Drug-disease interaction    [facility] action(s):  Defer     Requires labs : Yes      Medication Therapy Plan: Drug-Disease: metFORMIN and Nephrotic syndrome    Pharmacist review requested: Yes     Appointments  past 12m or future 3m with PCP    Date Provider   Last Visit   Visit date not found Ginna Juarez MD   Next Visit   Visit date not found Ginna Juarez MD   ED visits in past 90 days: 0        Note composed:4:22 PM 10/03/2024

## 2024-10-03 NOTE — TELEPHONE ENCOUNTER
Care Due:                  Date            Visit Type   Department     Provider  --------------------------------------------------------------------------------                                EP -                              PRIMARY      [facility] FAM[facility]  Last Visit: 03-      CARE ([facility])   MEDICINE       Ginna Juarez  Next Visit: None Scheduled  None         None Found                                                            Last  Test          Frequency    Reason                     Performed    Due Date  --------------------------------------------------------------------------------    HBA1C.......  6 months...  metFORMIN................  06- 12-    [facility] Embedded Care Due Messages. Reference number: [id number].   10/03/2024 9:40:30 AM CDT

## 2024-10-04 RX ORDER — METFORMIN HYDROCHLORIDE 1000 MG/1
TABLET ORAL
Qty: 90 TABLET | Refills: 0 | Status: SHIPPED | OUTPATIENT
Start: 2024-10-04

## 2024-10-04 NOTE — TELEPHONE ENCOUNTER
Provider Staff:  Action required for this patient     Please see care gap opportunities below in Care Due Message.    Thanks!  [facility]     Appointments      Date Provider   Last Visit   Visit date not found Ginna Juarez MD   Next Visit   Visit date not found Ginna Juarez MD      Refill Decision Note   Giles Bravo  is requesting a refill authorization.  Brief Assessment and Rationale for Refill:  Approve     Medication Therapy Plan:         Pharmacist review requested: Yes   Extended chart review required: Yes   Comments:     Note composed:12:17 AM 10/04/2024

## 2024-12-23 ENCOUNTER — TELEPHONE (OUTPATIENT)
Dept: FAMILY MEDICINE | Facility: CLINIC | Age: 49
End: 2024-12-23
Payer: OTHER GOVERNMENT

## 2024-12-23 DIAGNOSIS — E03.9 HYPOTHYROIDISM, UNSPECIFIED TYPE: ICD-10-CM

## 2024-12-23 DIAGNOSIS — Z13.6 ENCOUNTER FOR LIPID SCREENING FOR CARDIOVASCULAR DISEASE: ICD-10-CM

## 2024-12-23 DIAGNOSIS — E11.9 TYPE 2 DIABETES MELLITUS WITHOUT COMPLICATION, WITHOUT LONG-TERM CURRENT USE OF INSULIN: Primary | ICD-10-CM

## 2024-12-23 DIAGNOSIS — Z13.220 ENCOUNTER FOR LIPID SCREENING FOR CARDIOVASCULAR DISEASE: ICD-10-CM

## 2024-12-23 NOTE — TELEPHONE ENCOUNTER
----- Message from Ginna Juarez MD sent at 12/23/2024  6:09 AM CST -----  Please add cbc,A1c,lipid,tsh and hepatic panel to scheduled labs in Feb and change to fasting. Follow up with me or Radha 1-2 weeks after labs.

## 2025-01-14 DIAGNOSIS — E11.9 TYPE 2 DIABETES MELLITUS WITHOUT COMPLICATION, WITHOUT LONG-TERM CURRENT USE OF INSULIN: ICD-10-CM

## 2025-01-15 RX ORDER — METFORMIN HYDROCHLORIDE 1000 MG/1
TABLET ORAL
Qty: 90 TABLET | Refills: 0 | Status: SHIPPED | OUTPATIENT
Start: 2025-01-15

## 2025-01-15 NOTE — TELEPHONE ENCOUNTER
Refill Routing Note   Medication(s) are not appropriate for processing by [facility] for the following reason(s):        Required labs outdated    [facility] action(s):  Defer             Appointments  past 12m or future 3m with PCP    Date Provider   Last Visit   3/20/2024 Ginna Juarez MD   Next Visit   Visit date not found Ginna Juarez MD   ED visits in past 90 days: 0        Note composed:11:25 PM 01/14/2025

## 2025-01-15 NOTE — TELEPHONE ENCOUNTER
Unable to retrieve patient chart and identify care due.  [facility] Embedded Care Due Messages. Reference number: [id number].   1/14/2025 7:23:16 PM CST

## 2025-01-28 ENCOUNTER — PATIENT OUTREACH (OUTPATIENT)
Dept: ADMINISTRATIVE | Facility: HOSPITAL | Age: 50
End: 2025-01-28

## 2025-01-28 DIAGNOSIS — E11.65 UNCONTROLLED TYPE 2 DIABETES MELLITUS WITH HYPERGLYCEMIA: ICD-10-CM

## 2025-02-18 ENCOUNTER — LAB VISIT (OUTPATIENT)
Dept: LAB | Facility: HOSPITAL | Age: 50
End: 2025-02-18
Attending: INTERNAL MEDICINE
Payer: OTHER GOVERNMENT

## 2025-02-18 DIAGNOSIS — N05.0 MINIMAL CHANGE DISEASE: ICD-10-CM

## 2025-02-18 DIAGNOSIS — R80.9 NEPHROTIC RANGE PROTEINURIA: ICD-10-CM

## 2025-02-18 LAB
ALBUMIN SERPL BCP-MCNC: 4 G/DL (ref 3.5–5.2)
ANION GAP SERPL CALC-SCNC: 9 MMOL/L (ref 8–16)
BUN SERPL-MCNC: 12 MG/DL (ref 6–20)
CALCIUM SERPL-MCNC: 9.3 MG/DL (ref 8.7–10.5)
CHLORIDE SERPL-SCNC: 100 MMOL/L (ref 95–110)
CO2 SERPL-SCNC: 26 MMOL/L (ref 23–29)
CREAT SERPL-MCNC: 0.8 MG/DL (ref 0.5–1.4)
EST. GFR  (NO RACE VARIABLE): >60 ML/MIN/1.73 M^2
GLUCOSE SERPL-MCNC: 338 MG/DL (ref 70–110)
PHOSPHATE SERPL-MCNC: 3.1 MG/DL (ref 2.7–4.5)
POTASSIUM SERPL-SCNC: 4.3 MMOL/L (ref 3.5–5.1)
SODIUM SERPL-SCNC: 135 MMOL/L (ref 136–145)

## 2025-02-18 PROCEDURE — 80069 RENAL FUNCTION PANEL: CPT | Performed by: INTERNAL MEDICINE

## 2025-02-18 PROCEDURE — 36415 COLL VENOUS BLD VENIPUNCTURE: CPT | Mod: PO | Performed by: INTERNAL MEDICINE

## 2025-02-25 ENCOUNTER — RESULTS FOLLOW-UP (OUTPATIENT)
Dept: NEPHROLOGY | Facility: CLINIC | Age: 50
End: 2025-02-25

## 2025-03-13 ENCOUNTER — OFFICE VISIT (OUTPATIENT)
Dept: NEPHROLOGY | Facility: CLINIC | Age: 50
End: 2025-03-13
Payer: OTHER GOVERNMENT

## 2025-03-13 VITALS
BODY MASS INDEX: 22.73 KG/M2 | WEIGHT: 128.31 LBS | HEIGHT: 63 IN | HEART RATE: 96 BPM | DIASTOLIC BLOOD PRESSURE: 64 MMHG | OXYGEN SATURATION: 97 % | SYSTOLIC BLOOD PRESSURE: 116 MMHG

## 2025-03-13 DIAGNOSIS — N05.0 MINIMAL CHANGE DISEASE: Primary | ICD-10-CM

## 2025-03-13 DIAGNOSIS — R80.9 NEPHROTIC RANGE PROTEINURIA: ICD-10-CM

## 2025-03-13 PROCEDURE — 99214 OFFICE O/P EST MOD 30 MIN: CPT | Mod: S$PBB,,, | Performed by: INTERNAL MEDICINE

## 2025-03-13 PROCEDURE — 99213 OFFICE O/P EST LOW 20 MIN: CPT | Mod: PBBFAC,PN | Performed by: INTERNAL MEDICINE

## 2025-03-13 PROCEDURE — 99999 PR PBB SHADOW E&M-EST. PATIENT-LVL III: CPT | Mod: PBBFAC,,, | Performed by: INTERNAL MEDICINE

## 2025-03-13 NOTE — PROGRESS NOTES
"  Subjective:       Patient ID: Giles Bravo is a 50 y.o.  female who presents for return patient evaluation for chronic renal failure.      There have been no recent illnesses, hospitalizations or procedures.  She has no uremic or urinary symptoms and is in her usual state of health.        Review of Systems   Constitutional:  Negative for fatigue.   HENT:  Negative for congestion.    Respiratory:  Negative for shortness of breath.    Cardiovascular:  Negative for leg swelling.   Gastrointestinal:  Negative for diarrhea and nausea.   Genitourinary:  Negative for decreased urine volume, difficulty urinating and dysuria.   Musculoskeletal:  Negative for arthralgias.   Neurological:  Negative for headaches.   Psychiatric/Behavioral:  Negative for confusion.        The past medical, family and social histories were reviewed for this encounter.     /64 (BP Location: Left arm, Patient Position: Sitting)   Pulse 96   Ht 5' 3" (1.6 m)   Wt 58.2 kg (128 lb 4.9 oz)   LMP  (LMP Unknown)   SpO2 97%   BMI 22.73 kg/m²     Objective:      Physical Exam  Vitals reviewed.   Constitutional:       General: She is not in acute distress.  HENT:      Head: Normocephalic and atraumatic.   Eyes:      General: No scleral icterus.  Neck:      Vascular: No JVD.   Cardiovascular:      Rate and Rhythm: Normal rate and regular rhythm.      Heart sounds: No murmur heard.  Pulmonary:      Effort: Pulmonary effort is normal.      Breath sounds: Normal breath sounds.   Abdominal:      General: There is no distension.      Palpations: Abdomen is soft.   Skin:     General: Skin is warm and dry.   Neurological:      Mental Status: She is alert and oriented to person, place, and time.         Assessment:       1. Minimal change disease    2. Nephrotic range proteinuria        Lab Results   Component Value Date    CREATININE 0.8 02/18/2025    BUN 12 02/18/2025     (L) 02/18/2025    K 4.3 02/18/2025     02/18/2025    " CO2 26 02/18/2025     Lab Results   Component Value Date    CALCIUM 9.3 02/18/2025    PHOS 3.1 02/18/2025     Lab Results   Component Value Date    HCT 41.5 08/10/2023     Prot/Creat Ratio, Urine   Date Value Ref Range Status   02/18/2025 0.18 0.00 - 0.20 Final   05/06/2024 0.07 0.00 - 0.20 Final   02/06/2024 0.13 0.00 - 0.20 Final     Plan:   Return to clinic in 12 months.  Labs for next visit include rp, upc in [place] q 6 mo per standing orders.    Baseline creatinine is 0.7.  UPC is negative.    She is s/p rituxan for a frequently relapsing steroid dependent JOSSE patient.  She is off of steroids and is doing great!

## 2025-04-16 ENCOUNTER — OFFICE VISIT (OUTPATIENT)
Dept: OBSTETRICS AND GYNECOLOGY | Facility: CLINIC | Age: 50
End: 2025-04-16
Payer: OTHER GOVERNMENT

## 2025-04-16 ENCOUNTER — HOSPITAL ENCOUNTER (OUTPATIENT)
Dept: RADIOLOGY | Facility: HOSPITAL | Age: 50
Discharge: HOME | End: 2025-04-16
Attending: OBSTETRICS & GYNECOLOGY
Payer: OTHER GOVERNMENT

## 2025-04-16 VITALS
WEIGHT: 127.63 LBS | BODY MASS INDEX: 22.61 KG/M2 | SYSTOLIC BLOOD PRESSURE: 118 MMHG | DIASTOLIC BLOOD PRESSURE: 78 MMHG

## 2025-04-16 DIAGNOSIS — Z12.31 BREAST CANCER SCREENING BY MAMMOGRAM: ICD-10-CM

## 2025-04-16 DIAGNOSIS — Z01.419 GYNECOLOGIC EXAM NORMAL: Primary | ICD-10-CM

## 2025-04-16 DIAGNOSIS — E11.9 TYPE 2 DIABETES MELLITUS WITHOUT COMPLICATION, WITHOUT LONG-TERM CURRENT USE OF INSULIN: ICD-10-CM

## 2025-04-16 DIAGNOSIS — Z78.0 MENOPAUSE: ICD-10-CM

## 2025-04-16 PROCEDURE — 99386 PREV VISIT NEW AGE 40-64: CPT | Mod: S$PBB,,, | Performed by: OBSTETRICS & GYNECOLOGY

## 2025-04-16 PROCEDURE — 87624 HPV HI-RISK TYP POOLED RSLT: CPT | Performed by: OBSTETRICS & GYNECOLOGY

## 2025-04-16 PROCEDURE — 99999 PR PBB SHADOW E&M-EST. PATIENT-LVL IV: CPT | Mod: PBBFAC,,, | Performed by: OBSTETRICS & GYNECOLOGY

## 2025-04-16 PROCEDURE — 77063 BREAST TOMOSYNTHESIS BI: CPT | Mod: 26,,, | Performed by: RADIOLOGY

## 2025-04-16 PROCEDURE — 77063 BREAST TOMOSYNTHESIS BI: CPT | Mod: TC,PN

## 2025-04-16 PROCEDURE — 77067 SCR MAMMO BI INCL CAD: CPT | Mod: 26,,, | Performed by: RADIOLOGY

## 2025-04-16 PROCEDURE — 88175 CYTOPATH C/V AUTO FLUID REDO: CPT | Performed by: OBSTETRICS & GYNECOLOGY

## 2025-04-16 PROCEDURE — 99214 OFFICE O/P EST MOD 30 MIN: CPT | Mod: PBBFAC,PN | Performed by: OBSTETRICS & GYNECOLOGY

## 2025-04-16 NOTE — PROGRESS NOTES
Chief Complaint   Patient presents with    Annual Exam    Establish Care       History of Present Illness: Giles Bravo is a 50 y.o. female that presents today 4/16/2025 with No LMP recorded (lmp unknown). Patient is postmenopausal.  for well gyn visit.    Past Medical History:   Diagnosis Date    Diabetes mellitus     recent diagnosis    Fatty liver     GERD (gastroesophageal reflux disease)     Helicobacter pylori (H. pylori) infection 5/17/2019    Hepatomegaly     Hypothyroid     Minimal change disease     Dr. Panchal    Pleural effusion on right 10/21/2019    Severe malnutrition 5/11/2019       Past Surgical History:   Procedure Laterality Date    BREAST BIOPSY      COLONOSCOPY N/A 12/01/2021    Procedure: COLONOSCOPY;  Surgeon: Дмитрий Johnson MD;  Location: [facility];  Service: Endoscopy;  Laterality: N/A;    COSMETIC SURGERY      ESOPHAGOGASTRODUODENOSCOPY N/A 05/13/2019    Procedure: EGD (ESOPHAGOGASTRODUODENOSCOPY);  Surgeon: Bola Snyder MD;  Location: [facility];  Service: Endoscopy;  Laterality: N/A; positive for h pylori    ESOPHAGOGASTRODUODENOSCOPY N/A 10/24/2019    Procedure: EGD (ESOPHAGOGASTRODUODENOSCOPY);  Surgeon: Matt Sanchez MD;  Location: [facility];  Service: Endoscopy;  Laterality: N/A; gastritis; biopsy: stomach- MILD CHRONIC INACTIVE GASTRITIS AND INTESTINAL METAPLASIA; erosion; NEGATIVE FOR HELICOBACTER PYLORI; duodenum WNL    ESOPHAGOGASTRODUODENOSCOPY N/A 12/01/2021    Procedure: EGD (ESOPHAGOGASTRODUODENOSCOPY);  Surgeon: Дмитрий Johnson MD;  Location: [facility];  Service: Endoscopy;  Laterality: N/A;    RENAL BIOPSY      May 2019 at [facility], path sent to [facility]       Medications Prior to Visit[1]    Review of patient's allergies indicates:  No Known Allergies    Family History   Problem Relation Name Age of Onset    Breast cancer Mother      Cancer Father      Heart disease Father      Cancer Brother      Kidney disease Neg Hx      Colon cancer Neg Hx       Esophageal cancer Neg Hx      Stomach cancer Neg Hx      Ovarian cancer Neg Hx      Uterine cancer Neg Hx         Social History     Tobacco Use    Smoking status: Never    Smokeless tobacco: Never   Substance Use Topics    Alcohol use: Not Currently       Social History     Substance and Sexual Activity   Sexual Activity Not Currently       OB History    Para Term  AB Living   3 2 2  1 2   SAB IAB Ectopic Multiple Live Births   1    2      # Outcome Date GA Lbr Harmeet/2nd Weight Sex Type Anes PTL Lv   3 SAB            2 Term      Vag-Spont      1 Term      Vag-Spont          Review of Symptoms:  GENERAL: Denies weight gain or weight loss. Feeling well overall.   SKIN: Denies rash or lesions.   HEAD: Denies head injury or headache.   NODES: Denies enlarged lymph nodes.   CHEST: Denies chest pain or shortness of breath.   CARDIOVASCULAR: Denies palpitations or left sided chest pain.   ABDOMEN: No abdominal pain, constipation, diarrhea, nausea, vomiting or rectal bleeding.   URINARY: No frequency, dysuria, hematuria, or burning on urination.  HEMATOLOGIC: No easy bruisability or excessive bleeding.   MUSCULOSKELETAL: Denies joint pain or swelling.     /78 (Patient Position: Sitting)   Wt 57.9 kg (127 lb 10.3 oz)   LMP  (LMP Unknown)   Body mass index is 22.61 kg/m².      Physical Exam:  APPEARANCE: Well nourished, well developed, in no acute distress.  SKIN: Normal skin turgor, no lesions.  NECK: Neck symmetric without masses   RESPIRATORY: Normal respiratory effort with no retractions or use of accessory muscles  CARDIOVASCULAR: Peripheral vascular system with no swelling no varicosities and palpation of pulses normal  LYMPHATIC: No enlargements of the lymph nodes noted in the neck, axillae, or groin  ABDOMEN: Soft. No tenderness or masses. No hepatosplenomegaly. No hernias.  BREASTS: Symmetrical, no skin changes or visible lesions. No palpable masses, nipple discharge or adenopathy  bilaterally.  PELVIC: Normal external female genitalia without lesions. Normal hair distribution. Adequate perineal body, normal urethral meatus. Urethra with no masses.  Bladder nontender. Vagina moist and well rugated without lesions or discharge. Cervix pink and without lesions. No significant cystocele or rectocele. Bimanual exam showed uterus normal size, shape, position, mobile and nontender. Adnexa without masses or tenderness. Urethra and bladder normal.   EXTREMITIES: No clubbing cyanosis or edema.    ASSESSMENT/PLAN:  Gynecologic exam normal  -     Liquid-Based Pap Smear, Screening    Type 2 diabetes mellitus without complication, without long-term current use of insulin    Breast cancer screening by mammogram  -     Mammo Digital Screening Bilat w/ Mikie (XPD); Future; Expected date: 04/16/2025    Menopause  Comments:  2023  does not want HRT          Patient was counseled today on Pelvic exams and Pap Smear guidelines.   We discussed STD screening if at high risk for a STD.  We discussed recommendation for breast cancer screening with mammogram every other year after the age of 40 and annually after the age of 50.    We discussed colon cancer screening when indicated.   Osteoporosis screening discussed when indicated.   She was advised to see her primary care physician for all other health maintenance.     FOLLOW-UP with me for next routine visit.          [1]   Outpatient Medications Prior to Visit   Medication Sig Dispense Refill    ascorbic acid, vitamin C, (VITAMIN C) 1000 MG tablet Take 1,000 mg by mouth once daily.      calcium citrate (CALCITRATE) 200 mg (950 mg) tablet Take 1 tablet by mouth once daily.      coQ10, ubiquinol, 100 mg Cap Take by mouth.      metFORMIN (GLUCOPHAGE) 1000 MG tablet TAKE 1 TABLET(1000 MG) BY MOUTH DAILY WITH BREAKFAST 90 tablet 0    multivitamin (THERAGRAN) per tablet Take 1 tablet by mouth once daily.      QUERCETIN DIHYDRATE, BULK, MISC by Misc.(Non-Drug; Combo Route)  route.      vitamin D (VITAMIN D3) 1000 units Tab Take 1,000 Units by mouth once daily.      antiox #8/om3/dha/epa/lut/zeax (PRESERVISION AREDS 2, OMEGA-3, ORAL) Take by mouth. (Patient not taking: Reported on 4/16/2025)      cefUROXime (CEFTIN) 250 MG tablet Take 1 tablet (250 mg total) by mouth every 12 (twelve) hours. (Patient not taking: Reported on 4/16/2025) 14 tablet 0    multivit-mins no.63/iron/folic (M-VIT ORAL) Take by mouth. (Patient not taking: Reported on 4/16/2025)      promethazine (PHENERGAN) 25 MG tablet Take 1 tablet (25 mg total) by mouth every 6 (six) hours as needed for Nausea. (Patient not taking: Reported on 4/16/2025) 30 tablet 0    tamsulosin (FLOMAX) 0.4 mg Cap Take 1 capsule (0.4 mg total) by mouth once daily. (Patient not taking: Reported on 3/13/2025) 30 capsule 0    vitamin E 400 UNIT capsule Take 400 Units by mouth once daily. (Patient not taking: Reported on 4/16/2025)      zinc gluconate 50 mg tablet Take 50 mg by mouth once daily. (Patient not taking: Reported on 4/16/2025)       No facility-administered medications prior to visit.

## 2025-04-17 ENCOUNTER — RESULTS FOLLOW-UP (OUTPATIENT)
Dept: OBSTETRICS AND GYNECOLOGY | Facility: CLINIC | Age: 50
End: 2025-04-17

## 2025-04-26 RX ORDER — FUROSEMIDE 40 MG/1
40 TABLET ORAL DAILY
Qty: 90 TABLET | Refills: 1 | Status: SHIPPED | OUTPATIENT
Start: 2025-04-26 | End: 2025-10-23

## 2025-08-19 ENCOUNTER — PATIENT MESSAGE (OUTPATIENT)
Dept: ADMINISTRATIVE | Facility: HOSPITAL | Age: 50
End: 2025-08-19
Payer: OTHER GOVERNMENT